# Patient Record
Sex: FEMALE | Race: WHITE | Employment: OTHER | URBAN - METROPOLITAN AREA
[De-identification: names, ages, dates, MRNs, and addresses within clinical notes are randomized per-mention and may not be internally consistent; named-entity substitution may affect disease eponyms.]

---

## 2018-08-22 ENCOUNTER — NURSE ONLY (OUTPATIENT)
Dept: FAMILY MEDICINE CLINIC | Age: 78
End: 2018-08-22
Payer: MEDICARE

## 2018-08-22 ENCOUNTER — HOSPITAL ENCOUNTER (OUTPATIENT)
Age: 78
Discharge: HOME | End: 2018-08-24
Payer: MEDICARE

## 2018-08-22 DIAGNOSIS — E78.5 HYPERLIPIDEMIA, UNSPECIFIED HYPERLIPIDEMIA TYPE: ICD-10-CM

## 2018-08-22 DIAGNOSIS — E55.9 VITAMIN D DEFICIENCY: Primary | ICD-10-CM

## 2018-08-22 DIAGNOSIS — E55.9 VITAMIN D DEFICIENCY: ICD-10-CM

## 2018-08-22 LAB
ALBUMIN SERPL-MCNC: 4 G/DL (ref 3.5–5.2)
ALP BLD-CCNC: 97 U/L (ref 35–104)
ALT SERPL-CCNC: 8 U/L (ref 0–32)
ANION GAP SERPL CALCULATED.3IONS-SCNC: 18 MMOL/L (ref 7–16)
AST SERPL-CCNC: 18 U/L (ref 0–31)
BASOPHILS ABSOLUTE: 0.02 E9/L (ref 0–0.2)
BASOPHILS RELATIVE PERCENT: 0.4 % (ref 0–2)
BILIRUB SERPL-MCNC: 0.5 MG/DL (ref 0–1.2)
BUN BLDV-MCNC: 13 MG/DL (ref 8–23)
CALCIUM SERPL-MCNC: 9.2 MG/DL (ref 8.6–10.2)
CHLORIDE BLD-SCNC: 100 MMOL/L (ref 98–107)
CHOLESTEROL, TOTAL: 269 MG/DL (ref 0–199)
CO2: 22 MMOL/L (ref 22–29)
CREAT SERPL-MCNC: 1 MG/DL (ref 0.5–1)
EOSINOPHILS ABSOLUTE: 0.04 E9/L (ref 0.05–0.5)
EOSINOPHILS RELATIVE PERCENT: 0.7 % (ref 0–6)
GFR AFRICAN AMERICAN: >60
GFR NON-AFRICAN AMERICAN: 54 ML/MIN/1.73
GLUCOSE BLD-MCNC: 100 MG/DL (ref 74–109)
HCT VFR BLD CALC: 43.3 % (ref 34–48)
HDLC SERPL-MCNC: 70 MG/DL
HEMOGLOBIN: 13.8 G/DL (ref 11.5–15.5)
IMMATURE GRANULOCYTES #: 0.01 E9/L
IMMATURE GRANULOCYTES %: 0.2 % (ref 0–5)
LDL CHOLESTEROL CALCULATED: 178 MG/DL (ref 0–99)
LYMPHOCYTES ABSOLUTE: 1.51 E9/L (ref 1.5–4)
LYMPHOCYTES RELATIVE PERCENT: 27.7 % (ref 20–42)
MCH RBC QN AUTO: 29.9 PG (ref 26–35)
MCHC RBC AUTO-ENTMCNC: 31.9 % (ref 32–34.5)
MCV RBC AUTO: 93.7 FL (ref 80–99.9)
MONOCYTES ABSOLUTE: 0.39 E9/L (ref 0.1–0.95)
MONOCYTES RELATIVE PERCENT: 7.1 % (ref 2–12)
NEUTROPHILS ABSOLUTE: 3.49 E9/L (ref 1.8–7.3)
NEUTROPHILS RELATIVE PERCENT: 63.9 % (ref 43–80)
PDW BLD-RTO: 13.9 FL (ref 11.5–15)
PLATELET # BLD: 145 E9/L (ref 130–450)
PMV BLD AUTO: 11.8 FL (ref 7–12)
POTASSIUM SERPL-SCNC: 4.6 MMOL/L (ref 3.5–5)
RBC # BLD: 4.62 E12/L (ref 3.5–5.5)
SODIUM BLD-SCNC: 140 MMOL/L (ref 132–146)
TOTAL PROTEIN: 7.7 G/DL (ref 6.4–8.3)
TRIGL SERPL-MCNC: 107 MG/DL (ref 0–149)
TSH SERPL DL<=0.05 MIU/L-ACNC: 1.95 UIU/ML (ref 0.27–4.2)
VITAMIN D 25-HYDROXY: 43 NG/ML (ref 30–100)
VLDLC SERPL CALC-MCNC: 21 MG/DL
WBC # BLD: 5.5 E9/L (ref 4.5–11.5)

## 2018-08-22 PROCEDURE — 80053 COMPREHEN METABOLIC PANEL: CPT

## 2018-08-22 PROCEDURE — 36415 COLL VENOUS BLD VENIPUNCTURE: CPT | Performed by: FAMILY MEDICINE

## 2018-08-22 PROCEDURE — 80061 LIPID PANEL: CPT

## 2018-08-22 PROCEDURE — 84443 ASSAY THYROID STIM HORMONE: CPT

## 2018-08-22 PROCEDURE — 85025 COMPLETE CBC W/AUTO DIFF WBC: CPT

## 2018-08-22 PROCEDURE — 82306 VITAMIN D 25 HYDROXY: CPT

## 2018-08-28 ENCOUNTER — OFFICE VISIT (OUTPATIENT)
Dept: FAMILY MEDICINE CLINIC | Age: 78
End: 2018-08-28
Payer: MEDICARE

## 2018-08-28 VITALS
HEIGHT: 65 IN | SYSTOLIC BLOOD PRESSURE: 180 MMHG | OXYGEN SATURATION: 98 % | TEMPERATURE: 98.2 F | HEART RATE: 64 BPM | RESPIRATION RATE: 18 BRPM | DIASTOLIC BLOOD PRESSURE: 72 MMHG | BODY MASS INDEX: 23.03 KG/M2 | WEIGHT: 138.2 LBS

## 2018-08-28 DIAGNOSIS — Z00.00 ROUTINE GENERAL MEDICAL EXAMINATION AT A HEALTH CARE FACILITY: Primary | ICD-10-CM

## 2018-08-28 PROCEDURE — G0439 PPPS, SUBSEQ VISIT: HCPCS | Performed by: FAMILY MEDICINE

## 2018-08-28 PROCEDURE — 4040F PNEUMOC VAC/ADMIN/RCVD: CPT | Performed by: FAMILY MEDICINE

## 2018-08-28 ASSESSMENT — LIFESTYLE VARIABLES
HAVE YOU OR SOMEONE ELSE BEEN INJURED AS A RESULT OF YOUR DRINKING: 0
HOW OFTEN DURING THE LAST YEAR HAVE YOU HAD A FEELING OF GUILT OR REMORSE AFTER DRINKING: 0
HOW OFTEN DURING THE LAST YEAR HAVE YOU NEEDED AN ALCOHOLIC DRINK FIRST THING IN THE MORNING TO GET YOURSELF GOING AFTER A NIGHT OF HEAVY DRINKING: 0
HOW MANY STANDARD DRINKS CONTAINING ALCOHOL DO YOU HAVE ON A TYPICAL DAY: 0
HAS A RELATIVE, FRIEND, DOCTOR, OR ANOTHER HEALTH PROFESSIONAL EXPRESSED CONCERN ABOUT YOUR DRINKING OR SUGGESTED YOU CUT DOWN: 0
HOW OFTEN DURING THE LAST YEAR HAVE YOU BEEN UNABLE TO REMEMBER WHAT HAPPENED THE NIGHT BEFORE BECAUSE YOU HAD BEEN DRINKING: 0
HOW OFTEN DO YOU HAVE A DRINK CONTAINING ALCOHOL: 1
AUDIT-C TOTAL SCORE: 1
AUDIT TOTAL SCORE: 1
HOW OFTEN DO YOU HAVE SIX OR MORE DRINKS ON ONE OCCASION: 0
HOW OFTEN DURING THE LAST YEAR HAVE YOU FOUND THAT YOU WERE NOT ABLE TO STOP DRINKING ONCE YOU HAD STARTED: 0
HOW OFTEN DURING THE LAST YEAR HAVE YOU FAILED TO DO WHAT WAS NORMALLY EXPECTED FROM YOU BECAUSE OF DRINKING: 0

## 2018-08-28 ASSESSMENT — PATIENT HEALTH QUESTIONNAIRE - PHQ9
SUM OF ALL RESPONSES TO PHQ QUESTIONS 1-9: 0
SUM OF ALL RESPONSES TO PHQ QUESTIONS 1-9: 0

## 2018-08-28 ASSESSMENT — ANXIETY QUESTIONNAIRES: GAD7 TOTAL SCORE: 0

## 2019-03-06 ENCOUNTER — OFFICE VISIT (OUTPATIENT)
Dept: ORTHOPEDIC SURGERY | Age: 79
End: 2019-03-06
Payer: MEDICARE

## 2019-03-06 VITALS
WEIGHT: 138 LBS | HEIGHT: 65 IN | BODY MASS INDEX: 22.99 KG/M2 | HEART RATE: 74 BPM | SYSTOLIC BLOOD PRESSURE: 139 MMHG | TEMPERATURE: 97.3 F | DIASTOLIC BLOOD PRESSURE: 83 MMHG

## 2019-03-06 DIAGNOSIS — M79.675 PAIN OF TOE OF LEFT FOOT: Primary | ICD-10-CM

## 2019-03-06 PROCEDURE — 1123F ACP DISCUSS/DSCN MKR DOCD: CPT | Performed by: ORTHOPAEDIC SURGERY

## 2019-03-06 PROCEDURE — 1101F PT FALLS ASSESS-DOCD LE1/YR: CPT | Performed by: ORTHOPAEDIC SURGERY

## 2019-03-06 PROCEDURE — G8420 CALC BMI NORM PARAMETERS: HCPCS | Performed by: ORTHOPAEDIC SURGERY

## 2019-03-06 PROCEDURE — 1090F PRES/ABSN URINE INCON ASSESS: CPT | Performed by: ORTHOPAEDIC SURGERY

## 2019-03-06 PROCEDURE — 1036F TOBACCO NON-USER: CPT | Performed by: ORTHOPAEDIC SURGERY

## 2019-03-06 PROCEDURE — G8427 DOCREV CUR MEDS BY ELIG CLIN: HCPCS | Performed by: ORTHOPAEDIC SURGERY

## 2019-03-06 PROCEDURE — 99202 OFFICE O/P NEW SF 15 MIN: CPT | Performed by: ORTHOPAEDIC SURGERY

## 2019-03-06 PROCEDURE — G8484 FLU IMMUNIZE NO ADMIN: HCPCS | Performed by: ORTHOPAEDIC SURGERY

## 2019-03-06 PROCEDURE — 4040F PNEUMOC VAC/ADMIN/RCVD: CPT | Performed by: ORTHOPAEDIC SURGERY

## 2019-03-06 PROCEDURE — G8399 PT W/DXA RESULTS DOCUMENT: HCPCS | Performed by: ORTHOPAEDIC SURGERY

## 2019-04-23 ENCOUNTER — OFFICE VISIT (OUTPATIENT)
Dept: FAMILY MEDICINE CLINIC | Age: 79
End: 2019-04-23
Payer: MEDICARE

## 2019-04-23 VITALS
SYSTOLIC BLOOD PRESSURE: 137 MMHG | HEIGHT: 65 IN | BODY MASS INDEX: 24.62 KG/M2 | TEMPERATURE: 98.6 F | HEART RATE: 64 BPM | WEIGHT: 147.8 LBS | RESPIRATION RATE: 16 BRPM | DIASTOLIC BLOOD PRESSURE: 67 MMHG | OXYGEN SATURATION: 95 %

## 2019-04-23 DIAGNOSIS — L23.9 ALLERGIC CONTACT DERMATITIS, UNSPECIFIED TRIGGER: Primary | ICD-10-CM

## 2019-04-23 PROCEDURE — G8427 DOCREV CUR MEDS BY ELIG CLIN: HCPCS | Performed by: FAMILY MEDICINE

## 2019-04-23 PROCEDURE — G8399 PT W/DXA RESULTS DOCUMENT: HCPCS | Performed by: FAMILY MEDICINE

## 2019-04-23 PROCEDURE — 1123F ACP DISCUSS/DSCN MKR DOCD: CPT | Performed by: FAMILY MEDICINE

## 2019-04-23 PROCEDURE — 4040F PNEUMOC VAC/ADMIN/RCVD: CPT | Performed by: FAMILY MEDICINE

## 2019-04-23 PROCEDURE — 1036F TOBACCO NON-USER: CPT | Performed by: FAMILY MEDICINE

## 2019-04-23 PROCEDURE — G8420 CALC BMI NORM PARAMETERS: HCPCS | Performed by: FAMILY MEDICINE

## 2019-04-23 PROCEDURE — 1090F PRES/ABSN URINE INCON ASSESS: CPT | Performed by: FAMILY MEDICINE

## 2019-04-23 PROCEDURE — 99213 OFFICE O/P EST LOW 20 MIN: CPT | Performed by: FAMILY MEDICINE

## 2019-04-23 RX ORDER — TRIAMCINOLONE ACETONIDE 1 MG/G
CREAM TOPICAL
Qty: 60 G | Refills: 0 | Status: SHIPPED
Start: 2019-04-23 | End: 2022-09-23

## 2019-04-23 ASSESSMENT — PATIENT HEALTH QUESTIONNAIRE - PHQ9
SUM OF ALL RESPONSES TO PHQ QUESTIONS 1-9: 0
SUM OF ALL RESPONSES TO PHQ9 QUESTIONS 1 & 2: 0
SUM OF ALL RESPONSES TO PHQ QUESTIONS 1-9: 0
1. LITTLE INTEREST OR PLEASURE IN DOING THINGS: 0
2. FEELING DOWN, DEPRESSED OR HOPELESS: 0

## 2019-04-23 NOTE — PROGRESS NOTES
Rash:  Patient is here today with complaints of a rash. This has been going on for 1 week(s). Rash is located on right wrist.  It is not spreading. Exacerbating factors include none. Alleviating factors include hydrocortisone cream and Essential oils. Associated signs and symptoms include redness and raised. Patient has not changed hygiene products. Patient does not have pets. This has not happened to patient in the past.  Patient has not had recent travel. Patient's past medical, surgical, social and/or family history reviewed, updated in chart, and are non-contributory (unless otherwise stated). Medications and allergies also reviewed and updated in chart.       Review of Systems:  Constitutional:  No fever, no fatigue, no chills, no headaches, no weight change  Dermatology:  No rash, no mole, no dry or sensitive skin  ENT:  No cough, no sore throat, no sinus pain, no runny nose, no ear pain  Cardiology:  No chest pain, no palpitations, no leg edema, no shortness of breath, no PND  Gastroenterology:  No dysphagia, no abdominal pain, no nausea, no vomiting, no constipation, no diarrhea, no heartburn  Musculoskeletal:  No joint pain, no leg cramps, no back pain, no muscle aches  Respiratory:  No shortness of breath, no orthopnea, no wheezing, no HAYWARD, no hemoptysis  Urology:  No blood in the urine, no urinary frequency, no urinary incontinence, no urinary urgency, no nocturia, no dysuria    Vitals:    04/23/19 1459 04/23/19 1504   BP: (!) 152/78 137/67   Pulse: 64    Resp: 16    Temp: 98.6 °F (37 °C)    TempSrc: Oral    SpO2: 95%    Weight: 147 lb 12.8 oz (67 kg)    Height: 5' 5\" (1.651 m)        General:  Patient alert and oriented x 3, NAD, pleasant  HEENT:  Atraumatic, normocephalic, PERRLA, EOMI, clear conjunctiva, TMs clear, nose-clear, throat - no erythema  Neck:  Supple, no goiter, no carotid bruits, no LAD  Lungs:  CTA B  Heart:  RRR, no murmurs, gallops or rubs  Abdomen:  Soft/nt/nd, + bowel sounds  Extremities:  No clubbing, cyanosis or edema  Skin: R wrist with erythematous papules    Assessment/Plan:      Sushant Leyva was seen today for rash. Diagnoses and all orders for this visit:    Allergic contact dermatitis, unspecified trigger    Other orders  -     triamcinolone (KENALOG) 0.1 % cream; Apply to affected area twice a day      As above. Call or go to ED immediately if symptoms worsen or persist.  No follow-ups on file. , or sooner if necessary. Educational materials and/or home exercises printed for patient's review and were included in patient instructions on his/her After Visit Summary and given to patient at the end of visit. Counseled regarding above diagnosis, including possible risks and complications,  especially if left uncontrolled. Counseled regarding the possible side effects, risks, benefits and alternatives to treatment; patient and/or guardian verbalizes understanding, agrees, feels comfortable with and wishes to proceed with above treatment plan. Advised patient to call with any new medication issues, and read all Rx info from pharmacy to assure aware of all possible risks and side effects of medication before taking. Reviewed age and gender appropriate health screening exams and vaccinations. Advised patient regarding importance of keeping up with recommended health maintenance and to schedule as soon as possible if overdue, as this is important in assessing for undiagnosed pathology, especially cancer, as well as protecting against potentially harmful/life threatening disease. Patient and/or guardian verbalizes understanding and agrees with above counseling, assessment and plan. All questions answered.

## 2019-07-18 DIAGNOSIS — E55.9 VITAMIN D DEFICIENCY: ICD-10-CM

## 2019-07-18 DIAGNOSIS — E78.5 HYPERLIPIDEMIA, UNSPECIFIED HYPERLIPIDEMIA TYPE: Primary | ICD-10-CM

## 2019-09-10 ENCOUNTER — HOSPITAL ENCOUNTER (OUTPATIENT)
Age: 79
Discharge: HOME | End: 2019-09-12
Payer: MEDICARE

## 2019-09-10 ENCOUNTER — NURSE ONLY (OUTPATIENT)
Dept: FAMILY MEDICINE CLINIC | Age: 79
End: 2019-09-10
Payer: MEDICARE

## 2019-09-10 DIAGNOSIS — E78.5 HYPERLIPIDEMIA, UNSPECIFIED HYPERLIPIDEMIA TYPE: ICD-10-CM

## 2019-09-10 DIAGNOSIS — E55.9 VITAMIN D DEFICIENCY: ICD-10-CM

## 2019-09-10 LAB
ALBUMIN SERPL-MCNC: 4.2 G/DL (ref 3.5–5.2)
ALP BLD-CCNC: 113 U/L (ref 35–104)
ALT SERPL-CCNC: 7 U/L (ref 0–32)
ANION GAP SERPL CALCULATED.3IONS-SCNC: 13 MMOL/L (ref 7–16)
AST SERPL-CCNC: 16 U/L (ref 0–31)
BASOPHILS ABSOLUTE: 0.02 E9/L (ref 0–0.2)
BASOPHILS RELATIVE PERCENT: 0.5 % (ref 0–2)
BILIRUB SERPL-MCNC: 0.4 MG/DL (ref 0–1.2)
BUN BLDV-MCNC: 13 MG/DL (ref 8–23)
CALCIUM SERPL-MCNC: 9.2 MG/DL (ref 8.6–10.2)
CHLORIDE BLD-SCNC: 102 MMOL/L (ref 98–107)
CHOLESTEROL, TOTAL: 270 MG/DL (ref 0–199)
CO2: 24 MMOL/L (ref 22–29)
CREAT SERPL-MCNC: 1.1 MG/DL (ref 0.5–1)
EOSINOPHILS ABSOLUTE: 0.08 E9/L (ref 0.05–0.5)
EOSINOPHILS RELATIVE PERCENT: 1.9 % (ref 0–6)
GFR AFRICAN AMERICAN: 58
GFR NON-AFRICAN AMERICAN: 48 ML/MIN/1.73
GLUCOSE BLD-MCNC: 101 MG/DL (ref 74–99)
HCT VFR BLD CALC: 44.2 % (ref 34–48)
HDLC SERPL-MCNC: 81 MG/DL
HEMOGLOBIN: 13.5 G/DL (ref 11.5–15.5)
IMMATURE GRANULOCYTES #: 0.01 E9/L
IMMATURE GRANULOCYTES %: 0.2 % (ref 0–5)
LDL CHOLESTEROL CALCULATED: 170 MG/DL (ref 0–99)
LYMPHOCYTES ABSOLUTE: 1.25 E9/L (ref 1.5–4)
LYMPHOCYTES RELATIVE PERCENT: 29.3 % (ref 20–42)
MCH RBC QN AUTO: 29.2 PG (ref 26–35)
MCHC RBC AUTO-ENTMCNC: 30.5 % (ref 32–34.5)
MCV RBC AUTO: 95.5 FL (ref 80–99.9)
MONOCYTES ABSOLUTE: 0.4 E9/L (ref 0.1–0.95)
MONOCYTES RELATIVE PERCENT: 9.4 % (ref 2–12)
NEUTROPHILS ABSOLUTE: 2.51 E9/L (ref 1.8–7.3)
NEUTROPHILS RELATIVE PERCENT: 58.7 % (ref 43–80)
PDW BLD-RTO: 13.6 FL (ref 11.5–15)
PLATELET # BLD: 307 E9/L (ref 130–450)
PMV BLD AUTO: 9.9 FL (ref 7–12)
POTASSIUM SERPL-SCNC: 4.5 MMOL/L (ref 3.5–5)
RBC # BLD: 4.63 E12/L (ref 3.5–5.5)
SODIUM BLD-SCNC: 139 MMOL/L (ref 132–146)
TOTAL PROTEIN: 7.3 G/DL (ref 6.4–8.3)
TRIGL SERPL-MCNC: 94 MG/DL (ref 0–149)
TSH SERPL DL<=0.05 MIU/L-ACNC: 1.67 UIU/ML (ref 0.27–4.2)
VITAMIN D 25-HYDROXY: 43 NG/ML (ref 30–100)
VLDLC SERPL CALC-MCNC: 19 MG/DL
WBC # BLD: 4.3 E9/L (ref 4.5–11.5)

## 2019-09-10 PROCEDURE — 84443 ASSAY THYROID STIM HORMONE: CPT

## 2019-09-10 PROCEDURE — 80053 COMPREHEN METABOLIC PANEL: CPT

## 2019-09-10 PROCEDURE — 85025 COMPLETE CBC W/AUTO DIFF WBC: CPT

## 2019-09-10 PROCEDURE — 80061 LIPID PANEL: CPT

## 2019-09-10 PROCEDURE — 82306 VITAMIN D 25 HYDROXY: CPT

## 2019-09-10 PROCEDURE — 36415 COLL VENOUS BLD VENIPUNCTURE: CPT | Performed by: FAMILY MEDICINE

## 2019-09-12 ENCOUNTER — OFFICE VISIT (OUTPATIENT)
Dept: FAMILY MEDICINE CLINIC | Age: 79
End: 2019-09-12
Payer: MEDICARE

## 2019-09-12 VITALS
BODY MASS INDEX: 24.16 KG/M2 | HEIGHT: 65 IN | HEART RATE: 63 BPM | OXYGEN SATURATION: 96 % | SYSTOLIC BLOOD PRESSURE: 167 MMHG | RESPIRATION RATE: 16 BRPM | TEMPERATURE: 98.3 F | WEIGHT: 145 LBS | DIASTOLIC BLOOD PRESSURE: 78 MMHG

## 2019-09-12 DIAGNOSIS — Z23 NEED FOR PNEUMOCOCCAL VACCINATION: Primary | ICD-10-CM

## 2019-09-12 DIAGNOSIS — Z12.31 ENCOUNTER FOR SCREENING MAMMOGRAM FOR MALIGNANT NEOPLASM OF BREAST: ICD-10-CM

## 2019-09-12 DIAGNOSIS — Z00.00 ROUTINE GENERAL MEDICAL EXAMINATION AT A HEALTH CARE FACILITY: ICD-10-CM

## 2019-09-12 PROCEDURE — 1123F ACP DISCUSS/DSCN MKR DOCD: CPT | Performed by: FAMILY MEDICINE

## 2019-09-12 PROCEDURE — G0439 PPPS, SUBSEQ VISIT: HCPCS | Performed by: FAMILY MEDICINE

## 2019-09-12 PROCEDURE — 4040F PNEUMOC VAC/ADMIN/RCVD: CPT | Performed by: FAMILY MEDICINE

## 2019-09-12 ASSESSMENT — LIFESTYLE VARIABLES
HOW MANY STANDARD DRINKS CONTAINING ALCOHOL DO YOU HAVE ON A TYPICAL DAY: 0
AUDIT TOTAL SCORE: 1
HOW OFTEN DO YOU HAVE SIX OR MORE DRINKS ON ONE OCCASION: 0
HAVE YOU OR SOMEONE ELSE BEEN INJURED AS A RESULT OF YOUR DRINKING: 0
HOW OFTEN DO YOU HAVE A DRINK CONTAINING ALCOHOL: 1
AUDIT-C TOTAL SCORE: 1
HOW OFTEN DURING THE LAST YEAR HAVE YOU BEEN UNABLE TO REMEMBER WHAT HAPPENED THE NIGHT BEFORE BECAUSE YOU HAD BEEN DRINKING: 0
HOW OFTEN DURING THE LAST YEAR HAVE YOU NEEDED AN ALCOHOLIC DRINK FIRST THING IN THE MORNING TO GET YOURSELF GOING AFTER A NIGHT OF HEAVY DRINKING: 0
HAS A RELATIVE, FRIEND, DOCTOR, OR ANOTHER HEALTH PROFESSIONAL EXPRESSED CONCERN ABOUT YOUR DRINKING OR SUGGESTED YOU CUT DOWN: 0
HOW OFTEN DURING THE LAST YEAR HAVE YOU FAILED TO DO WHAT WAS NORMALLY EXPECTED FROM YOU BECAUSE OF DRINKING: 0
HOW OFTEN DURING THE LAST YEAR HAVE YOU FOUND THAT YOU WERE NOT ABLE TO STOP DRINKING ONCE YOU HAD STARTED: 0
HOW OFTEN DURING THE LAST YEAR HAVE YOU HAD A FEELING OF GUILT OR REMORSE AFTER DRINKING: 0

## 2019-09-12 ASSESSMENT — PATIENT HEALTH QUESTIONNAIRE - PHQ9
SUM OF ALL RESPONSES TO PHQ QUESTIONS 1-9: 0
SUM OF ALL RESPONSES TO PHQ QUESTIONS 1-9: 0

## 2019-09-12 NOTE — PROGRESS NOTES
Provider    Wt Readings from Last 3 Encounters:   09/12/19 145 lb (65.8 kg)   04/23/19 147 lb 12.8 oz (67 kg)   03/06/19 138 lb (62.6 kg)     Vitals:    09/12/19 1103 09/12/19 1106   BP: (!) 168/83 (!) 167/78   Pulse: 63    Resp: 16    Temp: 98.3 °F (36.8 °C)    TempSrc: Oral    SpO2: 96%    Weight: 145 lb (65.8 kg)    Height: 5' 5\" (1.651 m)      Body mass index is 24.13 kg/m². Based upon direct observation of the patient, evaluation of cognition reveals recent and remote memory intact. General Appearance: alert and oriented to person, place and time, well developed and well- nourished, in no acute distress  Skin: warm and dry, no rash or erythema  Head: normocephalic and atraumatic  Eyes: pupils equal, round, and reactive to light, extraocular eye movements intact, conjunctivae normal  ENT: tympanic membrane, external ear and ear canal normal bilaterally, nose without deformity, nasal mucosa and turbinates normal without polyps  Neck: supple and non-tender without mass, no thyromegaly or thyroid nodules, no cervical lymphadenopathy  Pulmonary/Chest: clear to auscultation bilaterally- no wheezes, rales or rhonchi, normal air movement, no respiratory distress  Cardiovascular: normal rate, regular rhythm, normal S1 and S2, no murmurs, rubs, clicks, or gallops, distal pulses intact, no carotid bruits  Abdomen: soft, non-tender, non-distended, normal bowel sounds, no masses or organomegaly  Extremities: no cyanosis, clubbing or edema  Musculoskeletal: normal range of motion, no joint swelling, deformity or tenderness  Neurologic: reflexes normal and symmetric, no cranial nerve deficit, gait, coordination and speech normal    Patient's complete Health Risk Assessment and screening values have been reviewed and are found in Flowsheets. The following problems were reviewed today and where indicated follow up appointments were made and/or referrals ordered.     Positive Risk Factor Screenings with Interventions: Hearing/Vision:  No exam data present  Hearing/Vision  Do you or your family notice any trouble with your hearing?: No  Do you have difficulty driving, watching TV, or doing any of your daily activities because of your eyesight?: No  Have you had an eye exam within the past year?: (!) No  Hearing/Vision Interventions:  · Vision concerns:  patient encouraged to make appointment with his/her eye specialist    Personalized Preventive Plan   Current Health Maintenance Status    There is no immunization history on file for this patient. Health Maintenance   Topic Date Due    DTaP/Tdap/Td vaccine (1 - Tdap) 04/18/1961    Shingles Vaccine (1 of 2) 04/18/1992    Pneumococcal 65+ years Vaccine (1 of 2 - PCV13) 04/18/2007    Annual Wellness Visit (AWV)  05/29/2019    Flu vaccine (1) 09/01/2019    DEXA (modify frequency per FRAX score)  Completed     Recommendations for Preventive Services Due: see orders and patient instructions/AVS.  . Recommended screening schedule for the next 5-10 years is provided to the patient in written form: see Patient Instructions/AVS.    Germania Mata was seen today for medicare awv.     Diagnoses and all orders for this visit:    Need for pneumococcal vaccination

## 2019-10-09 ENCOUNTER — HOSPITAL ENCOUNTER (OUTPATIENT)
Dept: GENERAL RADIOLOGY | Age: 79
Discharge: HOME | End: 2019-10-11
Payer: MEDICARE

## 2019-10-09 DIAGNOSIS — Z12.31 ENCOUNTER FOR SCREENING MAMMOGRAM FOR MALIGNANT NEOPLASM OF BREAST: ICD-10-CM

## 2019-10-09 PROCEDURE — 77063 BREAST TOMOSYNTHESIS BI: CPT

## 2020-02-27 ENCOUNTER — TELEPHONE (OUTPATIENT)
Dept: FAMILY MEDICINE CLINIC | Age: 80
End: 2020-02-27

## 2020-02-27 RX ORDER — AMOXICILLIN 500 MG/1
500 CAPSULE ORAL 3 TIMES DAILY
Qty: 42 CAPSULE | Refills: 0 | Status: SHIPPED
Start: 2020-02-27 | End: 2020-03-19 | Stop reason: SDUPTHER

## 2020-02-27 NOTE — TELEPHONE ENCOUNTER
She asked for a refill on her amoxicillin 500 mg. She said she thinks she has bronchitis again which she gets due to the lyme's disease. She has a cough, clear mucous,no temp.  Please send to [facility]

## 2020-03-13 ENCOUNTER — TELEPHONE (OUTPATIENT)
Dept: FAMILY MEDICINE CLINIC | Age: 80
End: 2020-03-13

## 2020-03-13 RX ORDER — CIPROFLOXACIN HYDROCHLORIDE 3.5 MG/ML
1 SOLUTION/ DROPS TOPICAL 3 TIMES DAILY
Qty: 1 BOTTLE | Refills: 0 | Status: SHIPPED | OUTPATIENT
Start: 2020-03-13 | End: 2020-03-20

## 2020-03-13 NOTE — TELEPHONE ENCOUNTER
She has itchy and red eyes some. She said its not pink eye but they are a little watery.  She said her appt yesterday was cancelled and she asked if you can send in a script to [facility] [place]

## 2020-03-19 ENCOUNTER — OFFICE VISIT (OUTPATIENT)
Dept: FAMILY MEDICINE CLINIC | Age: 80
End: 2020-03-19
Payer: MEDICARE

## 2020-03-19 VITALS
RESPIRATION RATE: 16 BRPM | HEART RATE: 68 BPM | HEIGHT: 66 IN | WEIGHT: 150 LBS | SYSTOLIC BLOOD PRESSURE: 171 MMHG | OXYGEN SATURATION: 98 % | BODY MASS INDEX: 24.11 KG/M2 | TEMPERATURE: 98.7 F | DIASTOLIC BLOOD PRESSURE: 97 MMHG

## 2020-03-19 PROCEDURE — 1090F PRES/ABSN URINE INCON ASSESS: CPT | Performed by: FAMILY MEDICINE

## 2020-03-19 PROCEDURE — G8420 CALC BMI NORM PARAMETERS: HCPCS | Performed by: FAMILY MEDICINE

## 2020-03-19 PROCEDURE — 99213 OFFICE O/P EST LOW 20 MIN: CPT | Performed by: FAMILY MEDICINE

## 2020-03-19 PROCEDURE — G8399 PT W/DXA RESULTS DOCUMENT: HCPCS | Performed by: FAMILY MEDICINE

## 2020-03-19 PROCEDURE — 1036F TOBACCO NON-USER: CPT | Performed by: FAMILY MEDICINE

## 2020-03-19 PROCEDURE — G8427 DOCREV CUR MEDS BY ELIG CLIN: HCPCS | Performed by: FAMILY MEDICINE

## 2020-03-19 PROCEDURE — 1123F ACP DISCUSS/DSCN MKR DOCD: CPT | Performed by: FAMILY MEDICINE

## 2020-03-19 PROCEDURE — G8484 FLU IMMUNIZE NO ADMIN: HCPCS | Performed by: FAMILY MEDICINE

## 2020-03-19 PROCEDURE — 4040F PNEUMOC VAC/ADMIN/RCVD: CPT | Performed by: FAMILY MEDICINE

## 2020-03-19 RX ORDER — AMOXICILLIN 500 MG/1
500 CAPSULE ORAL 3 TIMES DAILY
Qty: 42 CAPSULE | Refills: 0 | Status: SHIPPED | OUTPATIENT
Start: 2020-03-19 | End: 2020-04-02

## 2020-03-19 NOTE — PROGRESS NOTES
Chief Complaint   Patient presents with    Other    Cough       HPI:  Patient is here for follow-up of bronchitis. Patient complains of feeling better, no fevers, less coughing so she thinks it almost done. She finished Amoxil a few days ago. She feels well. Patient's past medical, surgical, social and/or family history reviewed, updated in chart, and are non-contributory (unless otherwise stated). Medications and allergies also reviewed and updated in chart. Review of Systems:  Constitutional:  No fever, no fatigue, no chills, no headaches, no weight change  Dermatology:  No rash, no mole, no dry or sensitive skin  ENT:  No cough, no sore throat, no sinus pain, no runny nose, no ear pain  Cardiology:  No chest pain, no palpitations, no leg edema, no shortness of breath, no PND  Gastroenterology:  No dysphagia, no abdominal pain, no nausea, no vomiting, no constipation, no diarrhea, no heartburn  Musculoskeletal:  No joint pain, no leg cramps, no back pain, no muscle aches  Respiratory:  No shortness of breath, no orthopnea, no wheezing, no HAYWARD, no hemoptysis  Urology:  No blood in the urine, no urinary frequency, no urinary incontinence, no urinary urgency, no nocturia, no dysuria  Vitals:    03/19/20 0942   BP: (!) 171/97   Pulse: 68   Resp: 16   Temp: 98.7 °F (37.1 °C)   TempSrc: Temporal   SpO2: 98%   Weight: 150 lb (68 kg)   Height: 5' 5.5\" (1.664 m)       General:  Patient alert and oriented x 3, NAD, pleasant  HEENT:  Atraumatic, normocephalic, PERRLA, EOMI, clear conjunctiva, TMs clear, nose-clear, throat - no erythema  Neck:  Supple, no goiter, no carotid bruits, no lymphadenopathy  Lungs:  CTA B  Heart:  RRR, no murmurs, gallops or rubs  Abdomen:  Soft/nt/nd, + bowel sounds  Extremities:  No clubbing, cyanosis or edema  Skin: unremarkable    Assessment/Plan:      Jeanette Crowder was seen today for other and cough.     Diagnoses and all orders for this visit:    Acute bronchitis, unspecified

## 2020-03-24 ENCOUNTER — TELEPHONE (OUTPATIENT)
Dept: FAMILY MEDICINE CLINIC | Age: 80
End: 2020-03-24

## 2020-03-24 NOTE — TELEPHONE ENCOUNTER
[employer] enrollment letter, and Evisit flyer sent to patient.  Mailed to address listed in patients chart

## 2020-09-08 ENCOUNTER — NURSE ONLY (OUTPATIENT)
Dept: FAMILY MEDICINE CLINIC | Age: 80
End: 2020-09-08
Payer: MEDICARE

## 2020-09-08 ENCOUNTER — HOSPITAL ENCOUNTER (OUTPATIENT)
Age: 80
Discharge: HOME | End: 2020-09-10
Payer: MEDICARE

## 2020-09-08 PROCEDURE — 84443 ASSAY THYROID STIM HORMONE: CPT

## 2020-09-08 PROCEDURE — 36415 COLL VENOUS BLD VENIPUNCTURE: CPT | Performed by: FAMILY MEDICINE

## 2020-09-08 PROCEDURE — 80053 COMPREHEN METABOLIC PANEL: CPT

## 2020-09-08 PROCEDURE — 85025 COMPLETE CBC W/AUTO DIFF WBC: CPT

## 2020-09-08 PROCEDURE — 80061 LIPID PANEL: CPT

## 2020-09-09 LAB
ALBUMIN SERPL-MCNC: 3.9 G/DL (ref 3.5–5.2)
ALP BLD-CCNC: 88 U/L (ref 35–104)
ALT SERPL-CCNC: 8 U/L (ref 0–32)
ANION GAP SERPL CALCULATED.3IONS-SCNC: 16 MMOL/L (ref 7–16)
AST SERPL-CCNC: 21 U/L (ref 0–31)
BASOPHILS ABSOLUTE: 0.01 E9/L (ref 0–0.2)
BASOPHILS RELATIVE PERCENT: 0.2 % (ref 0–2)
BILIRUB SERPL-MCNC: 0.4 MG/DL (ref 0–1.2)
BUN BLDV-MCNC: 13 MG/DL (ref 8–23)
CALCIUM SERPL-MCNC: 9.5 MG/DL (ref 8.6–10.2)
CHLORIDE BLD-SCNC: 101 MMOL/L (ref 98–107)
CHOLESTEROL, TOTAL: 230 MG/DL (ref 0–199)
CO2: 20 MMOL/L (ref 22–29)
CREAT SERPL-MCNC: 0.9 MG/DL (ref 0.5–1)
EOSINOPHILS ABSOLUTE: 0.03 E9/L (ref 0.05–0.5)
EOSINOPHILS RELATIVE PERCENT: 0.6 % (ref 0–6)
GFR AFRICAN AMERICAN: >60
GFR NON-AFRICAN AMERICAN: >60 ML/MIN/1.73
GLUCOSE BLD-MCNC: 82 MG/DL (ref 74–99)
HCT VFR BLD CALC: 43.7 % (ref 34–48)
HDLC SERPL-MCNC: 78 MG/DL
HEMOGLOBIN: 13.4 G/DL (ref 11.5–15.5)
IMMATURE GRANULOCYTES #: 0.03 E9/L
IMMATURE GRANULOCYTES %: 0.6 % (ref 0–5)
LDL CHOLESTEROL CALCULATED: 138 MG/DL (ref 0–99)
LYMPHOCYTES ABSOLUTE: 1.29 E9/L (ref 1.5–4)
LYMPHOCYTES RELATIVE PERCENT: 27.4 % (ref 20–42)
MCH RBC QN AUTO: 29.4 PG (ref 26–35)
MCHC RBC AUTO-ENTMCNC: 30.7 % (ref 32–34.5)
MCV RBC AUTO: 95.8 FL (ref 80–99.9)
MONOCYTES ABSOLUTE: 0.39 E9/L (ref 0.1–0.95)
MONOCYTES RELATIVE PERCENT: 8.3 % (ref 2–12)
NEUTROPHILS ABSOLUTE: 2.95 E9/L (ref 1.8–7.3)
NEUTROPHILS RELATIVE PERCENT: 62.9 % (ref 43–80)
PDW BLD-RTO: 14.1 FL (ref 11.5–15)
PLATELET # BLD: 322 E9/L (ref 130–450)
PMV BLD AUTO: 10.1 FL (ref 7–12)
POTASSIUM SERPL-SCNC: 4.7 MMOL/L (ref 3.5–5)
RBC # BLD: 4.56 E12/L (ref 3.5–5.5)
SODIUM BLD-SCNC: 137 MMOL/L (ref 132–146)
TOTAL PROTEIN: 6.8 G/DL (ref 6.4–8.3)
TRIGL SERPL-MCNC: 70 MG/DL (ref 0–149)
TSH SERPL DL<=0.05 MIU/L-ACNC: 1.3 UIU/ML (ref 0.27–4.2)
VLDLC SERPL CALC-MCNC: 14 MG/DL
WBC # BLD: 4.7 E9/L (ref 4.5–11.5)

## 2020-09-15 ENCOUNTER — OFFICE VISIT (OUTPATIENT)
Dept: FAMILY MEDICINE CLINIC | Age: 80
End: 2020-09-15
Payer: MEDICARE

## 2020-09-15 VITALS
WEIGHT: 141 LBS | TEMPERATURE: 97.1 F | OXYGEN SATURATION: 98 % | BODY MASS INDEX: 23.49 KG/M2 | DIASTOLIC BLOOD PRESSURE: 88 MMHG | HEIGHT: 65 IN | HEART RATE: 55 BPM | SYSTOLIC BLOOD PRESSURE: 138 MMHG | RESPIRATION RATE: 16 BRPM

## 2020-09-15 PROCEDURE — 4040F PNEUMOC VAC/ADMIN/RCVD: CPT | Performed by: FAMILY MEDICINE

## 2020-09-15 PROCEDURE — 1123F ACP DISCUSS/DSCN MKR DOCD: CPT | Performed by: FAMILY MEDICINE

## 2020-09-15 PROCEDURE — G0439 PPPS, SUBSEQ VISIT: HCPCS | Performed by: FAMILY MEDICINE

## 2020-09-15 SDOH — ECONOMIC STABILITY: TRANSPORTATION INSECURITY
IN THE PAST 12 MONTHS, HAS THE LACK OF TRANSPORTATION KEPT YOU FROM MEDICAL APPOINTMENTS OR FROM GETTING MEDICATIONS?: NO

## 2020-09-15 SDOH — ECONOMIC STABILITY: INCOME INSECURITY: HOW HARD IS IT FOR YOU TO PAY FOR THE VERY BASICS LIKE FOOD, HOUSING, MEDICAL CARE, AND HEATING?: NOT HARD AT ALL

## 2020-09-15 SDOH — ECONOMIC STABILITY: TRANSPORTATION INSECURITY
IN THE PAST 12 MONTHS, HAS LACK OF TRANSPORTATION KEPT YOU FROM MEETINGS, WORK, OR FROM GETTING THINGS NEEDED FOR DAILY LIVING?: NO

## 2020-09-15 SDOH — ECONOMIC STABILITY: FOOD INSECURITY: WITHIN THE PAST 12 MONTHS, YOU WORRIED THAT YOUR FOOD WOULD RUN OUT BEFORE YOU GOT MONEY TO BUY MORE.: NEVER TRUE

## 2020-09-15 SDOH — ECONOMIC STABILITY: FOOD INSECURITY: WITHIN THE PAST 12 MONTHS, THE FOOD YOU BOUGHT JUST DIDN'T LAST AND YOU DIDN'T HAVE MONEY TO GET MORE.: NEVER TRUE

## 2020-09-15 ASSESSMENT — PATIENT HEALTH QUESTIONNAIRE - PHQ9
SUM OF ALL RESPONSES TO PHQ QUESTIONS 1-9: 0
2. FEELING DOWN, DEPRESSED OR HOPELESS: 0
SUM OF ALL RESPONSES TO PHQ9 QUESTIONS 1 & 2: 0
1. LITTLE INTEREST OR PLEASURE IN DOING THINGS: 0
SUM OF ALL RESPONSES TO PHQ QUESTIONS 1-9: 0

## 2020-09-15 ASSESSMENT — LIFESTYLE VARIABLES: HOW OFTEN DO YOU HAVE A DRINK CONTAINING ALCOHOL: 0

## 2020-09-15 NOTE — PROGRESS NOTES
Medicare Annual Wellness Visit  Name: America Lopez Date: 9/15/2020   MRN: [id number] Sex: Female   Age: 68 y.o. Ethnicity: Non-/Non    : 1940 Race: Asisatou Enriquez is here for Medicare AWV    Screenings for behavioral, psychosocial and functional/safety risks, and cognitive dysfunction are all negative except as indicated below. These results, as well as other patient data from the [address] form, are documented in Flowsheets linked to this Encounter. Allergies   Allergen Reactions    Tetracyclines & Related Rash         Prior to Visit Medications    Medication Sig Taking? Authorizing Provider   triamcinolone (KENALOG) 0.1 % cream Apply to affected area twice a day Yes Felipe Edouard MD   fish oil-omega-3 fatty acids 1000 MG capsule Take 1 g by mouth daily. Yes Historical Provider, MD   Cholecalciferol (VITAMIN D) 2000 UNITS CAPS capsule Take  by mouth. Yes Historical Provider, MD   Ascorbic Acid (VITAMIN C) 500 MG CAPS Take 1,000 mg by mouth. Yes Historical Provider, MD   Multiple Vitamins-Minerals ([employer] BONE HEALTH) TABS Take  by mouth. Yes Historical Provider, MD         Past Medical History:   Diagnosis Date    Osteopenia        Past Surgical History:   Procedure Laterality Date    JOINT REPLACEMENT Right 2010    Right total hip arthroplasty. Kay Hough. My Lorenzo MD    JOINT REPLACEMENT Left 2004    Hemiarthroplasty of the left shoulder. Kay Hough. MD Bryce    OTHER SURGICAL HISTORY Right 03/10/2009    Injection arthrogram right hip. Kay Hough.  My Lorenzo MD    THYROIDECTOMY, PARTIAL  1971    right left         Family History   Problem Relation Age of Onset    Osteoporosis Mother        CareTeam (Including outside providers/suppliers regularly involved in providing care):   Patient Care Team:  Felipe Edouard MD as PCP - Lord Ollie MD as PCP - [facility] Empaneled Provider    Wt Readings from Hearing/Vision:  No exam data present  Hearing/Vision  Do you or your family notice any trouble with your hearing?: No  Do you have difficulty driving, watching TV, or doing any of your daily activities because of your eyesight?: No  Have you had an eye exam within the past year?: (!) No  Hearing/Vision Interventions:  · Vision concerns:  patient encouraged to make appointment with his/her eye specialist    Safety:  Safety  Do you have working smoke detectors?: Yes  Have all throw rugs been removed or fastened?: (!) No  Do you have non-slip mats or surfaces in all bathtubs/showers?: Yes  Do all of your stairways have a railing or banister?: Yes  Are your doorways, halls and stairs free of clutter?: Yes  Do you always fasten your seatbelt when you are in a car?: Yes  Safety Interventions:  · Home safety tips provided    Personalized Preventive Plan   Current Health Maintenance Status    There is no immunization history on file for this patient. Health Maintenance   Topic Date Due    DTaP/Tdap/Td vaccine (1 - Tdap) 04/18/1961    Shingles Vaccine (1 of 2) 04/18/1992    Pneumococcal 65+ years Vaccine (1 of 1 - PPSV23) 04/18/2007    Annual Wellness Visit (AWV)  05/29/2019    Flu vaccine (1) 09/01/2020    DEXA (modify frequency per FRAX score)  Completed    Hepatitis A vaccine  Aged Out    Hepatitis B vaccine  Aged Out    Hib vaccine  Aged Out    Meningococcal (ACWY) vaccine  Aged Out     Recommendations for [employer] Due: see orders and patient instructions/AVS.  . Recommended screening schedule for the next 5-10 years is provided to the patient in written form: see Patient Instructions/AVS.    There are no diagnoses linked to this encounter.

## 2020-09-15 NOTE — PATIENT INSTRUCTIONS
Personalized Preventive Plan for Myrna Riddles - 9/15/2020  Medicare offers a range of preventive health benefits. Some of the tests and screenings are paid in full while other may be subject to a deductible, co-insurance, and/or copay. Some of these benefits include a comprehensive review of your medical history including lifestyle, illnesses that may run in your family, and various assessments and screenings as appropriate. After reviewing your medical record and screening and assessments performed today your provider may have ordered immunizations, labs, imaging, and/or referrals for you. A list of these orders (if applicable) as well as your Preventive Care list are included within your After Visit Summary for your review. Other Preventive Recommendations:    · A preventive eye exam performed by an eye specialist is recommended every 1-2 years to screen for glaucoma; cataracts, macular degeneration, and other eye disorders. · A preventive dental visit is recommended every 6 months. · Try to get at least 150 minutes of exercise per week or 10,000 steps per day on a pedometer . · Order or download the FREE \"Exercise & Physical Activity: Your Everyday Guide\" from [employer]. Call 1-272.258.9406 or search [employer] online. · You need 8595-8590 mg of calcium and 5805-6400 IU of vitamin D per day. It is possible to meet your calcium requirement with diet alone, but a vitamin D supplement is usually necessary to meet this goal.  · When exposed to the sun, use a sunscreen that protects against both UVA and UVB radiation with an SPF of 30 or greater. Reapply every 2 to 3 hours or after sweating, drying off with a towel, or swimming. · Always wear a seat belt when traveling in a car. Always wear a helmet when riding a bicycle or motorcycle.

## 2020-10-14 ENCOUNTER — HOSPITAL ENCOUNTER (OUTPATIENT)
Dept: GENERAL RADIOLOGY | Age: 80
Discharge: HOME | End: 2020-10-16
Payer: MEDICARE

## 2020-10-14 PROCEDURE — 77063 BREAST TOMOSYNTHESIS BI: CPT

## 2020-11-30 ENCOUNTER — TELEPHONE (OUTPATIENT)
Dept: ADMINISTRATIVE | Age: 80
End: 2020-11-30

## 2020-11-30 NOTE — TELEPHONE ENCOUNTER
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 She does not have any symptoms, but wants to come to the office for a test. Please contact pt with further instructions.

## 2020-12-01 ENCOUNTER — HOSPITAL ENCOUNTER (OUTPATIENT)
Age: 80
Discharge: HOME | End: 2020-12-01
Payer: MEDICARE

## 2020-12-02 DIAGNOSIS — Z20.822 CLOSE EXPOSURE TO COVID-19 VIRUS: ICD-10-CM

## 2020-12-03 LAB
SARS-COV-2: NOT DETECTED
SOURCE: NORMAL

## 2021-09-03 ENCOUNTER — TELEPHONE (OUTPATIENT)
Dept: FAMILY MEDICINE CLINIC | Age: 81
End: 2021-09-03

## 2021-09-03 DIAGNOSIS — E78.5 HYPERLIPIDEMIA, UNSPECIFIED HYPERLIPIDEMIA TYPE: Primary | ICD-10-CM

## 2021-09-03 DIAGNOSIS — E55.9 VITAMIN D DEFICIENCY: ICD-10-CM

## 2021-09-16 ENCOUNTER — OFFICE VISIT (OUTPATIENT)
Dept: FAMILY MEDICINE CLINIC | Age: 81
End: 2021-09-16
Payer: MEDICARE

## 2021-09-16 VITALS
DIASTOLIC BLOOD PRESSURE: 80 MMHG | WEIGHT: 142.5 LBS | SYSTOLIC BLOOD PRESSURE: 160 MMHG | RESPIRATION RATE: 16 BRPM | OXYGEN SATURATION: 98 % | HEIGHT: 65 IN | TEMPERATURE: 97.1 F | HEART RATE: 64 BPM | BODY MASS INDEX: 23.74 KG/M2

## 2021-09-16 DIAGNOSIS — E78.5 HYPERLIPIDEMIA, UNSPECIFIED HYPERLIPIDEMIA TYPE: ICD-10-CM

## 2021-09-16 DIAGNOSIS — Z00.00 ROUTINE GENERAL MEDICAL EXAMINATION AT A HEALTH CARE FACILITY: Primary | ICD-10-CM

## 2021-09-16 PROCEDURE — 1123F ACP DISCUSS/DSCN MKR DOCD: CPT | Performed by: FAMILY MEDICINE

## 2021-09-16 PROCEDURE — 4040F PNEUMOC VAC/ADMIN/RCVD: CPT | Performed by: FAMILY MEDICINE

## 2021-09-16 PROCEDURE — G0439 PPPS, SUBSEQ VISIT: HCPCS | Performed by: FAMILY MEDICINE

## 2021-09-16 SDOH — ECONOMIC STABILITY: FOOD INSECURITY: WITHIN THE PAST 12 MONTHS, THE FOOD YOU BOUGHT JUST DIDN'T LAST AND YOU DIDN'T HAVE MONEY TO GET MORE.: NEVER TRUE

## 2021-09-16 SDOH — ECONOMIC STABILITY: FOOD INSECURITY: WITHIN THE PAST 12 MONTHS, YOU WORRIED THAT YOUR FOOD WOULD RUN OUT BEFORE YOU GOT MONEY TO BUY MORE.: NEVER TRUE

## 2021-09-16 ASSESSMENT — LIFESTYLE VARIABLES
AUDIT TOTAL SCORE: 1
HOW OFTEN DO YOU HAVE A DRINK CONTAINING ALCOHOL: 1
HOW OFTEN DURING THE LAST YEAR HAVE YOU HAD A FEELING OF GUILT OR REMORSE AFTER DRINKING: 0
HOW OFTEN DURING THE LAST YEAR HAVE YOU FOUND THAT YOU WERE NOT ABLE TO STOP DRINKING ONCE YOU HAD STARTED: 0
HAVE YOU OR SOMEONE ELSE BEEN INJURED AS A RESULT OF YOUR DRINKING: 0
HOW OFTEN DURING THE LAST YEAR HAVE YOU NEEDED AN ALCOHOLIC DRINK FIRST THING IN THE MORNING TO GET YOURSELF GOING AFTER A NIGHT OF HEAVY DRINKING: 0
HOW MANY STANDARD DRINKS CONTAINING ALCOHOL DO YOU HAVE ON A TYPICAL DAY: 0
AUDIT-C TOTAL SCORE: 1
HOW OFTEN DURING THE LAST YEAR HAVE YOU FAILED TO DO WHAT WAS NORMALLY EXPECTED FROM YOU BECAUSE OF DRINKING: 0
HAS A RELATIVE, FRIEND, DOCTOR, OR ANOTHER HEALTH PROFESSIONAL EXPRESSED CONCERN ABOUT YOUR DRINKING OR SUGGESTED YOU CUT DOWN: 0
HOW OFTEN DURING THE LAST YEAR HAVE YOU BEEN UNABLE TO REMEMBER WHAT HAPPENED THE NIGHT BEFORE BECAUSE YOU HAD BEEN DRINKING: 0
HOW OFTEN DO YOU HAVE SIX OR MORE DRINKS ON ONE OCCASION: 0

## 2021-09-16 ASSESSMENT — PATIENT HEALTH QUESTIONNAIRE - PHQ9
SUM OF ALL RESPONSES TO PHQ QUESTIONS 1-9: 0
1. LITTLE INTEREST OR PLEASURE IN DOING THINGS: 0
SUM OF ALL RESPONSES TO PHQ QUESTIONS 1-9: 0
2. FEELING DOWN, DEPRESSED OR HOPELESS: 0
SUM OF ALL RESPONSES TO PHQ QUESTIONS 1-9: 0
SUM OF ALL RESPONSES TO PHQ9 QUESTIONS 1 & 2: 0

## 2021-09-16 ASSESSMENT — SOCIAL DETERMINANTS OF HEALTH (SDOH): HOW HARD IS IT FOR YOU TO PAY FOR THE VERY BASICS LIKE FOOD, HOUSING, MEDICAL CARE, AND HEATING?: NOT HARD AT ALL

## 2021-09-16 NOTE — PROGRESS NOTES
Medicare Annual Wellness Visit  Name: Abiel Valderrama Date: 2021   MRN: [id number] Sex: Female   Age: 80 y.o. Ethnicity: Non- / Non    : 1940 Race: White (non-)      Jalil Pacheco is here for Medicare AWV and Discuss Labs    Screenings for behavioral, psychosocial and functional/safety risks, and cognitive dysfunction are all negative except as indicated below. These results, as well as other patient data from the [address] form, are documented in Flowsheets linked to this Encounter. BP at home is 120/80. Allergies   Allergen Reactions    Tetracyclines & Related Rash         Prior to Visit Medications    Medication Sig Taking? Authorizing Provider   Cholecalciferol (VITAMIN D) 2000 UNITS CAPS capsule Take  by mouth. Yes Historical Provider, MD   Ascorbic Acid (VITAMIN C) 500 MG CAPS Take 1,000 mg by mouth. Yes Historical Provider, MD   Multiple Vitamins-Minerals ([employer] BONE HEALTH) TABS Take  by mouth. Yes Historical Provider, MD   triamcinolone (KENALOG) 0.1 % cream Apply to affected area twice a day  Patient not taking: Reported on 2021  Ricky Maciel MD   fish oil-omega-3 fatty acids 1000 MG capsule Take 1 g by mouth daily. Patient not taking: Reported on 2021  Historical Provider, MD         Past Medical History:   Diagnosis Date    Osteopenia        Past Surgical History:   Procedure Laterality Date    JOINT REPLACEMENT Right 2010    Right total hip arthroplasty. Antonio Lazo. Jose Moctezuma MD    JOINT REPLACEMENT Left 2004    Hemiarthroplasty of the left shoulder. Antonio Lazo. MD Bryce    OTHER SURGICAL HISTORY Right 03/10/2009    Injection arthrogram right hip. Antonio Lazo.  MD Bryce    THYROIDECTOMY, PARTIAL  1971    right left         Family History   Problem Relation Age of Onset    Osteoporosis Mother        CareTeam (Including outside providers/suppliers regularly involved in providing care): Patient Care Team:  Earnest Sanchez MD as PCP - Omar Parrish MD as PCP - Alaina Corbett Provider    Wt Readings from Last 3 Encounters:   09/16/21 142 lb 8 oz (64.6 kg)   09/15/20 141 lb (64 kg)   03/19/20 150 lb (68 kg)     Vitals:    09/16/21 1021 09/16/21 1025   BP: (!) 192/83 (!) 192/79   Pulse: 64    Resp: 16    Temp: 97.1 °F (36.2 °C)    TempSrc: Temporal    SpO2: 98%    Weight: 142 lb 8 oz (64.6 kg)    Height: 5' 5\" (1.651 m)      Body mass index is 23.71 kg/m². Based upon direct observation of the patient, evaluation of cognition reveals recent and remote memory intact. General Appearance: alert and oriented to person, place and time, well developed and well- nourished, in no acute distress  Skin: warm and dry, no rash or erythema  Head: normocephalic and atraumatic  Eyes: pupils equal, round, and reactive to light, extraocular eye movements intact, conjunctivae normal  ENT: tympanic membrane, external ear and ear canal normal bilaterally, nose without deformity, nasal mucosa and turbinates normal without polyps  Neck: supple and non-tender without mass, no thyromegaly or thyroid nodules, no cervical lymphadenopathy  Pulmonary/Chest: clear to auscultation bilaterally- no wheezes, rales or rhonchi, normal air movement, no respiratory distress  Cardiovascular: normal rate, regular rhythm, normal S1 and S2, no murmurs, rubs, clicks, or gallops, distal pulses intact, no carotid bruits  Abdomen: soft, non-tender, non-distended, normal bowel sounds, no masses or organomegaly  Extremities: no cyanosis, clubbing or edema  Musculoskeletal: normal range of motion, no joint swelling, deformity or tenderness  Neurologic: reflexes normal and symmetric, no cranial nerve deficit, gait, coordination and speech normal    Patient's complete Health Risk Assessment and screening values have been reviewed and are found in Flowsheets.  The following problems were reviewed today and where indicated

## 2021-09-16 NOTE — PATIENT INSTRUCTIONS
Personalized Preventive Plan for Myrna Barry - 9/16/2021  Medicare offers a range of preventive health benefits. Some of the tests and screenings are paid in full while other may be subject to a deductible, co-insurance, and/or copay. Some of these benefits include a comprehensive review of your medical history including lifestyle, illnesses that may run in your family, and various assessments and screenings as appropriate. After reviewing your medical record and screening and assessments performed today your provider may have ordered immunizations, labs, imaging, and/or referrals for you. A list of these orders (if applicable) as well as your Preventive Care list are included within your After Visit Summary for your review. Other Preventive Recommendations:    · A preventive eye exam performed by an eye specialist is recommended every 1-2 years to screen for glaucoma; cataracts, macular degeneration, and other eye disorders. · A preventive dental visit is recommended every 6 months. · Try to get at least 150 minutes of exercise per week or 10,000 steps per day on a pedometer . · Order or download the FREE \"Exercise & Physical Activity: Your Everyday Guide\" from [employer]. Call 1-644.917.4637 or search [employer] online. · You need 4391-0993 mg of calcium and 9918-1562 IU of vitamin D per day. It is possible to meet your calcium requirement with diet alone, but a vitamin D supplement is usually necessary to meet this goal.  · When exposed to the sun, use a sunscreen that protects against both UVA and UVB radiation with an SPF of 30 or greater. Reapply every 2 to 3 hours or after sweating, drying off with a towel, or swimming. · Always wear a seat belt when traveling in a car. Always wear a helmet when riding a bicycle or motorcycle.

## 2022-02-01 DIAGNOSIS — Z12.31 ENCOUNTER FOR SCREENING MAMMOGRAM FOR BREAST CANCER: Primary | ICD-10-CM

## 2022-02-11 ENCOUNTER — HOSPITAL ENCOUNTER (OUTPATIENT)
Dept: GENERAL RADIOLOGY | Age: 82
Discharge: HOME | End: 2022-02-13
Payer: MEDICARE

## 2022-02-11 DIAGNOSIS — Z12.31 ENCOUNTER FOR SCREENING MAMMOGRAM FOR BREAST CANCER: ICD-10-CM

## 2022-02-11 PROCEDURE — 77063 BREAST TOMOSYNTHESIS BI: CPT

## 2022-06-29 DIAGNOSIS — E55.9 VITAMIN D DEFICIENCY: ICD-10-CM

## 2022-06-29 DIAGNOSIS — R53.83 FATIGUE, UNSPECIFIED TYPE: ICD-10-CM

## 2022-06-29 DIAGNOSIS — E78.5 HYPERLIPIDEMIA, UNSPECIFIED HYPERLIPIDEMIA TYPE: Primary | ICD-10-CM

## 2022-09-16 DIAGNOSIS — E78.5 HYPERLIPIDEMIA, UNSPECIFIED HYPERLIPIDEMIA TYPE: ICD-10-CM

## 2022-09-16 DIAGNOSIS — E55.9 VITAMIN D DEFICIENCY: ICD-10-CM

## 2022-09-16 DIAGNOSIS — R53.83 FATIGUE, UNSPECIFIED TYPE: ICD-10-CM

## 2022-09-16 LAB
ALBUMIN SERPL-MCNC: 4.3 G/DL (ref 3.5–5.2)
ALP BLD-CCNC: 89 U/L (ref 35–104)
ALT SERPL-CCNC: 6 U/L (ref 0–32)
ANION GAP SERPL CALCULATED.3IONS-SCNC: 14 MMOL/L (ref 7–16)
AST SERPL-CCNC: 15 U/L (ref 0–31)
BASOPHILS ABSOLUTE: 0.02 E9/L (ref 0–0.2)
BASOPHILS RELATIVE PERCENT: 0.4 % (ref 0–2)
BILIRUB SERPL-MCNC: 0.5 MG/DL (ref 0–1.2)
BUN BLDV-MCNC: 15 MG/DL (ref 6–23)
CALCIUM SERPL-MCNC: 9.4 MG/DL (ref 8.6–10.2)
CHLORIDE BLD-SCNC: 102 MMOL/L (ref 98–107)
CHOLESTEROL, TOTAL: 285 MG/DL (ref 0–199)
CO2: 22 MMOL/L (ref 22–29)
CREAT SERPL-MCNC: 0.9 MG/DL (ref 0.5–1)
EOSINOPHILS ABSOLUTE: 0.04 E9/L (ref 0.05–0.5)
EOSINOPHILS RELATIVE PERCENT: 0.9 % (ref 0–6)
GFR AFRICAN AMERICAN: >60
GFR NON-AFRICAN AMERICAN: >60 ML/MIN/1.73
GLUCOSE BLD-MCNC: 92 MG/DL (ref 74–99)
HCT VFR BLD CALC: 41.7 % (ref 34–48)
HDLC SERPL-MCNC: 83 MG/DL
HEMOGLOBIN: 13.8 G/DL (ref 11.5–15.5)
IMMATURE GRANULOCYTES #: 0.01 E9/L
IMMATURE GRANULOCYTES %: 0.2 % (ref 0–5)
LDL CHOLESTEROL CALCULATED: 184 MG/DL (ref 0–99)
LYMPHOCYTES ABSOLUTE: 1.27 E9/L (ref 1.5–4)
LYMPHOCYTES RELATIVE PERCENT: 28 % (ref 20–42)
MCH RBC QN AUTO: 30.1 PG (ref 26–35)
MCHC RBC AUTO-ENTMCNC: 33.1 % (ref 32–34.5)
MCV RBC AUTO: 91 FL (ref 80–99.9)
MONOCYTES ABSOLUTE: 0.38 E9/L (ref 0.1–0.95)
MONOCYTES RELATIVE PERCENT: 8.4 % (ref 2–12)
NEUTROPHILS ABSOLUTE: 2.82 E9/L (ref 1.8–7.3)
NEUTROPHILS RELATIVE PERCENT: 62.1 % (ref 43–80)
PDW BLD-RTO: 13.8 FL (ref 11.5–15)
PLATELET # BLD: 315 E9/L (ref 130–450)
PMV BLD AUTO: 10 FL (ref 7–12)
POTASSIUM SERPL-SCNC: 4.2 MMOL/L (ref 3.5–5)
RBC # BLD: 4.58 E12/L (ref 3.5–5.5)
SODIUM BLD-SCNC: 138 MMOL/L (ref 132–146)
TOTAL PROTEIN: 7.1 G/DL (ref 6.4–8.3)
TRIGL SERPL-MCNC: 90 MG/DL (ref 0–149)
TSH SERPL DL<=0.05 MIU/L-ACNC: 1.01 UIU/ML (ref 0.27–4.2)
VITAMIN D 25-HYDROXY: 55 NG/ML (ref 30–100)
VLDLC SERPL CALC-MCNC: 18 MG/DL
WBC # BLD: 4.5 E9/L (ref 4.5–11.5)

## 2022-09-23 ENCOUNTER — OFFICE VISIT (OUTPATIENT)
Dept: FAMILY MEDICINE CLINIC | Age: 82
End: 2022-09-23
Payer: MEDICARE

## 2022-09-23 VITALS
RESPIRATION RATE: 18 BRPM | WEIGHT: 122.1 LBS | HEIGHT: 65 IN | TEMPERATURE: 97.3 F | DIASTOLIC BLOOD PRESSURE: 80 MMHG | BODY MASS INDEX: 20.34 KG/M2 | OXYGEN SATURATION: 97 % | SYSTOLIC BLOOD PRESSURE: 142 MMHG | HEART RATE: 60 BPM

## 2022-09-23 DIAGNOSIS — Z00.00 MEDICARE ANNUAL WELLNESS VISIT, SUBSEQUENT: Primary | ICD-10-CM

## 2022-09-23 PROCEDURE — G0439 PPPS, SUBSEQ VISIT: HCPCS | Performed by: FAMILY MEDICINE

## 2022-09-23 PROCEDURE — 1123F ACP DISCUSS/DSCN MKR DOCD: CPT | Performed by: FAMILY MEDICINE

## 2022-09-23 SDOH — ECONOMIC STABILITY: FOOD INSECURITY: WITHIN THE PAST 12 MONTHS, YOU WORRIED THAT YOUR FOOD WOULD RUN OUT BEFORE YOU GOT MONEY TO BUY MORE.: NEVER TRUE

## 2022-09-23 SDOH — ECONOMIC STABILITY: FOOD INSECURITY: WITHIN THE PAST 12 MONTHS, THE FOOD YOU BOUGHT JUST DIDN'T LAST AND YOU DIDN'T HAVE MONEY TO GET MORE.: NEVER TRUE

## 2022-09-23 ASSESSMENT — PATIENT HEALTH QUESTIONNAIRE - PHQ9
SUM OF ALL RESPONSES TO PHQ QUESTIONS 1-9: 0
SUM OF ALL RESPONSES TO PHQ QUESTIONS 1-9: 0
1. LITTLE INTEREST OR PLEASURE IN DOING THINGS: 0
SUM OF ALL RESPONSES TO PHQ9 QUESTIONS 1 & 2: 0
2. FEELING DOWN, DEPRESSED OR HOPELESS: 0
SUM OF ALL RESPONSES TO PHQ QUESTIONS 1-9: 0
SUM OF ALL RESPONSES TO PHQ QUESTIONS 1-9: 0

## 2022-09-23 ASSESSMENT — SOCIAL DETERMINANTS OF HEALTH (SDOH): HOW HARD IS IT FOR YOU TO PAY FOR THE VERY BASICS LIKE FOOD, HOUSING, MEDICAL CARE, AND HEATING?: NOT HARD AT ALL

## 2022-09-23 ASSESSMENT — LIFESTYLE VARIABLES
HOW OFTEN DO YOU HAVE A DRINK CONTAINING ALCOHOL: NEVER
HOW MANY STANDARD DRINKS CONTAINING ALCOHOL DO YOU HAVE ON A TYPICAL DAY: PATIENT DOES NOT DRINK

## 2022-09-23 NOTE — PROGRESS NOTES
Medicare Annual Wellness Visit    Billy Carr is here for Medicare AWV (Pt has no questions or concerns at this time. HM reviewed. Pt declined a flu vaccine today. Labs were completed 9/16/22.)    Assessment & Plan   Medicare annual wellness visit, subsequent    Recommendations for Preventive Services Due: see orders and patient instructions/AVS.  Recommended screening schedule for the next 5-10 years is provided to the patient in written form: see Patient Instructions/AVS.     Return for Medicare Annual Wellness Visit in 1 year. Subjective       Patient's complete Health Risk Assessment and screening values have been reviewed and are found in Flowsheets. The following problems were reviewed today and where indicated follow up appointments were made and/or referrals ordered. Positive Risk Factor Screenings with Interventions:               Hearing/Vision:  Do you or your family notice any trouble with your hearing that hasn't been managed with hearing aids?: No  Do you have difficulty driving, watching TV, or doing any of your daily activities because of your eyesight?: No  Have you had an eye exam within the past year?: (!) No  No results found. Hearing/Vision Interventions:  Vision concerns:  patient encouraged to make appointment with his/her eye specialist            Objective   Vitals:    09/23/22 1010 09/23/22 1028   BP: (!) 185/80 (!) 142/80   Pulse: 60    Resp: 18    Temp: 97.3 °F (36.3 °C)    SpO2: 97%    Weight: 122 lb 1.6 oz (55.4 kg)    Height: 5' 5\" (1.651 m)       Body mass index is 20.32 kg/m².       General Appearance: alert and oriented to person, place and time, well developed and well- nourished, in no acute distress  Skin: warm and dry, no rash or erythema  Head: normocephalic and atraumatic  Eyes: pupils equal, round, and reactive to light, extraocular eye movements intact, conjunctivae normal  ENT: tympanic membrane, external ear and ear canal normal bilaterally, nose without

## 2022-09-23 NOTE — PATIENT INSTRUCTIONS
Personalized Preventive Plan for Stew Anthony - 9/23/2022  Medicare offers a range of preventive health benefits. Some of the tests and screenings are paid in full while other may be subject to a deductible, co-insurance, and/or copay. Some of these benefits include a comprehensive review of your medical history including lifestyle, illnesses that may run in your family, and various assessments and screenings as appropriate. After reviewing your medical record and screening and assessments performed today your provider may have ordered immunizations, labs, imaging, and/or referrals for you. A list of these orders (if applicable) as well as your Preventive Care list are included within your After Visit Summary for your review. Other Preventive Recommendations:    A preventive eye exam performed by an eye specialist is recommended every 1-2 years to screen for glaucoma; cataracts, macular degeneration, and other eye disorders. A preventive dental visit is recommended every 6 months. Try to get at least 150 minutes of exercise per week or 10,000 steps per day on a pedometer . Order or download the FREE \"Exercise & Physical Activity: Your Everyday Guide\" from [employer]. Call 7-115.549.8054 or search [employer] online. You need 0200-5691 mg of calcium and 9433-3323 IU of vitamin D per day. It is possible to meet your calcium requirement with diet alone, but a vitamin D supplement is usually necessary to meet this goal.  When exposed to the sun, use a sunscreen that protects against both UVA and UVB radiation with an SPF of 30 or greater. Reapply every 2 to 3 hours or after sweating, drying off with a towel, or swimming. Always wear a seat belt when traveling in a car. Always wear a helmet when riding a bicycle or motorcycle.

## 2022-10-04 DIAGNOSIS — Z12.31 BREAST CANCER SCREENING BY MAMMOGRAM: Primary | ICD-10-CM

## 2023-02-05 ENCOUNTER — APPOINTMENT (OUTPATIENT)
Dept: CT IMAGING | Age: 83
DRG: 494 | End: 2023-02-05
Payer: MEDICARE

## 2023-02-05 ENCOUNTER — APPOINTMENT (OUTPATIENT)
Dept: GENERAL RADIOLOGY | Age: 83
DRG: 494 | End: 2023-02-05
Payer: MEDICARE

## 2023-02-05 ENCOUNTER — HOSPITAL ENCOUNTER (INPATIENT)
Age: 83
LOS: 3 days | Discharge: NURSING, REHAB OR LONG-TERM CARE | DRG: 494 | End: 2023-02-08
Attending: EMERGENCY MEDICINE | Admitting: INTERNAL MEDICINE
Payer: MEDICARE

## 2023-02-05 DIAGNOSIS — S82.141A TIBIAL PLATEAU FRACTURE, RIGHT, CLOSED, INITIAL ENCOUNTER: Primary | ICD-10-CM

## 2023-02-05 DIAGNOSIS — S82.401A CLOSED FRACTURE OF RIGHT TIBIA AND FIBULA, INITIAL ENCOUNTER: ICD-10-CM

## 2023-02-05 DIAGNOSIS — S82.201A CLOSED FRACTURE OF RIGHT TIBIA AND FIBULA, INITIAL ENCOUNTER: ICD-10-CM

## 2023-02-05 LAB
ABO/RH: NORMAL
ANTIBODY SCREEN: NORMAL
APTT: 30.8 SEC (ref 24.5–35.1)
INR BLD: 1
PROTHROMBIN TIME: 10.4 SEC (ref 9.3–12.4)

## 2023-02-05 PROCEDURE — 73700 CT LOWER EXTREMITY W/O DYE: CPT

## 2023-02-05 PROCEDURE — 2580000003 HC RX 258: Performed by: NURSE PRACTITIONER

## 2023-02-05 PROCEDURE — 73564 X-RAY EXAM KNEE 4 OR MORE: CPT

## 2023-02-05 PROCEDURE — 73590 X-RAY EXAM OF LOWER LEG: CPT

## 2023-02-05 PROCEDURE — 86900 BLOOD TYPING SEROLOGIC ABO: CPT

## 2023-02-05 PROCEDURE — 86901 BLOOD TYPING SEROLOGIC RH(D): CPT

## 2023-02-05 PROCEDURE — 1200000000 HC SEMI PRIVATE

## 2023-02-05 PROCEDURE — 85730 THROMBOPLASTIN TIME PARTIAL: CPT

## 2023-02-05 PROCEDURE — 99285 EMERGENCY DEPT VISIT HI MDM: CPT

## 2023-02-05 PROCEDURE — 86850 RBC ANTIBODY SCREEN: CPT

## 2023-02-05 PROCEDURE — 6370000000 HC RX 637 (ALT 250 FOR IP): Performed by: NURSE PRACTITIONER

## 2023-02-05 PROCEDURE — 85610 PROTHROMBIN TIME: CPT

## 2023-02-05 PROCEDURE — 73552 X-RAY EXAM OF FEMUR 2/>: CPT

## 2023-02-05 PROCEDURE — 36415 COLL VENOUS BLD VENIPUNCTURE: CPT

## 2023-02-05 RX ORDER — ACETAMINOPHEN 325 MG/1
650 TABLET ORAL EVERY 6 HOURS PRN
Status: DISCONTINUED | OUTPATIENT
Start: 2023-02-05 | End: 2023-02-08 | Stop reason: HOSPADM

## 2023-02-05 RX ORDER — OXYCODONE HYDROCHLORIDE AND ACETAMINOPHEN 5; 325 MG/1; MG/1
1 TABLET ORAL EVERY 4 HOURS PRN
Status: DISCONTINUED | OUTPATIENT
Start: 2023-02-05 | End: 2023-02-08 | Stop reason: HOSPADM

## 2023-02-05 RX ORDER — SODIUM CHLORIDE 0.9 % (FLUSH) 0.9 %
5-40 SYRINGE (ML) INJECTION EVERY 12 HOURS SCHEDULED
Status: DISCONTINUED | OUTPATIENT
Start: 2023-02-05 | End: 2023-02-08 | Stop reason: HOSPADM

## 2023-02-05 RX ORDER — ONDANSETRON 4 MG/1
4 TABLET, ORALLY DISINTEGRATING ORAL EVERY 8 HOURS PRN
Status: DISCONTINUED | OUTPATIENT
Start: 2023-02-05 | End: 2023-02-08 | Stop reason: HOSPADM

## 2023-02-05 RX ORDER — ONDANSETRON 2 MG/ML
4 INJECTION INTRAMUSCULAR; INTRAVENOUS EVERY 6 HOURS PRN
Status: DISCONTINUED | OUTPATIENT
Start: 2023-02-05 | End: 2023-02-08 | Stop reason: HOSPADM

## 2023-02-05 RX ORDER — POLYETHYLENE GLYCOL 3350 17 G/17G
17 POWDER, FOR SOLUTION ORAL DAILY PRN
Status: DISCONTINUED | OUTPATIENT
Start: 2023-02-05 | End: 2023-02-08 | Stop reason: HOSPADM

## 2023-02-05 RX ORDER — SODIUM CHLORIDE 9 MG/ML
INJECTION, SOLUTION INTRAVENOUS PRN
Status: DISCONTINUED | OUTPATIENT
Start: 2023-02-05 | End: 2023-02-08 | Stop reason: HOSPADM

## 2023-02-05 RX ORDER — SODIUM CHLORIDE 9 MG/ML
INJECTION, SOLUTION INTRAVENOUS CONTINUOUS
Status: DISCONTINUED | OUTPATIENT
Start: 2023-02-05 | End: 2023-02-08 | Stop reason: HOSPADM

## 2023-02-05 RX ORDER — ACETAMINOPHEN 650 MG/1
650 SUPPOSITORY RECTAL EVERY 6 HOURS PRN
Status: DISCONTINUED | OUTPATIENT
Start: 2023-02-05 | End: 2023-02-08 | Stop reason: HOSPADM

## 2023-02-05 RX ORDER — SODIUM CHLORIDE 0.9 % (FLUSH) 0.9 %
10 SYRINGE (ML) INJECTION PRN
Status: DISCONTINUED | OUTPATIENT
Start: 2023-02-05 | End: 2023-02-08 | Stop reason: HOSPADM

## 2023-02-05 RX ADMIN — OXYCODONE AND ACETAMINOPHEN 1 TABLET: 5; 325 TABLET ORAL at 21:48

## 2023-02-05 RX ADMIN — OXYCODONE AND ACETAMINOPHEN 1 TABLET: 5; 325 TABLET ORAL at 16:55

## 2023-02-05 RX ADMIN — SODIUM CHLORIDE: 9 INJECTION, SOLUTION INTRAVENOUS at 16:58

## 2023-02-05 ASSESSMENT — PAIN SCALES - GENERAL
PAINLEVEL_OUTOF10: 5
PAINLEVEL_OUTOF10: 7
PAINLEVEL_OUTOF10: 6

## 2023-02-05 ASSESSMENT — PAIN DESCRIPTION - PAIN TYPE: TYPE: ACUTE PAIN

## 2023-02-05 ASSESSMENT — PAIN DESCRIPTION - DESCRIPTORS: DESCRIPTORS: ACHING;DULL;DISCOMFORT

## 2023-02-05 ASSESSMENT — PAIN DESCRIPTION - LOCATION: LOCATION: ARM

## 2023-02-05 ASSESSMENT — PAIN DESCRIPTION - ORIENTATION: ORIENTATION: RIGHT

## 2023-02-05 ASSESSMENT — PAIN DESCRIPTION - FREQUENCY: FREQUENCY: CONTINUOUS

## 2023-02-05 NOTE — ED NOTES
Contacted unit spoke with Hector johnson sent on fax patient in transport      [facility]  02/05/23 2833

## 2023-02-05 NOTE — ED PROVIDER NOTES
[address] 61        Pt Name: Emily Woody  MRN: [id number]  [place] 1940  Date of evaluation: 2/5/2023  Provider: Carolyn Mcadams MD  PCP: Armin Monson MD  Note Started: 1:14 PM EST 2/5/23    CHIEF COMPLAINT       Chief Complaint   Patient presents with    Dana Slate down one step, landed on right knee, swelling to shin, - thinners        HISTORY OF PRESENT ILLNESS: 1 or more Elements        Limitations to history : None    Emily Woody is a [de-identified] y.o. female who presents for fall and right leg injury. Patient reports she fell down 1 step and landed on her right knee. Unable to walk afterwards. Swelling to her knee. She complains of knee and proximal shin pain. Did not hit her head, no loss of conscious. She denies any other injuries. No neck or back pain. No chest pain or shortness of breath. No numbness or tingling or weakness or paralysis. Nursing Notes were all reviewed and agreed with or any disagreements were addressed in the HPI. REVIEW OF EXTERNAL NOTE :       EMS run report    Controlled Substance Monitoring:    Acute and Chronic Pain Monitoring:   No flowsheet data found. REVIEW OF SYSTEMS :      Positives and Pertinent negatives as per HPI. SURGICAL HISTORY     Past Surgical History:   Procedure Laterality Date    BREAST BIOPSY      JOINT REPLACEMENT Right 02/22/2010    Right total hip arthroplasty. Isadora Reyes. Ana Maria Sanchez MD    JOINT REPLACEMENT Left 11/05/2004    Hemiarthroplasty of the left shoulder. Isadora Reyes. MD Bryce    OTHER SURGICAL HISTORY Right 03/10/2009    Injection arthrogram right hip. Isadora Wu MD    THYROIDECTOMY, PARTIAL  1971    right left       CURRENTMEDICATIONS       Current Discharge Medication List        CONTINUE these medications which have NOT CHANGED    Details   Cholecalciferol (VITAMIN D) 2000 UNITS CAPS capsule Take  by mouth.         Ascorbic Acid (VITAMIN C) 500 MG CAPS Take 1,000 mg by mouth. Multiple Vitamins-Minerals ([employer] BONE HEALTH) TABS Take  by mouth. ALLERGIES     Tetracyclines & related    FAMILYHISTORY       Family History   Problem Relation Age of Onset    Osteoporosis Mother     Breast Cancer Maternal Aunt     Breast Cancer Maternal Aunt         SOCIAL HISTORY       Social History     Tobacco Use    Smoking status: Never    Smokeless tobacco: Never   Substance Use Topics    Alcohol use: No    Drug use: No       SCREENINGS        Robyn Coma Scale  Eye Opening: Spontaneous  Best Verbal Response: Oriented  Best Motor Response: Obeys commands  Robyn Coma Scale Score: 15                CIWA Assessment  BP: (!) 185/73  Heart Rate: 98           PHYSICAL EXAM  1 or more Elements     ED Triage Vitals   BP Temp Temp src Pulse Resp SpO2 Height Weight   -- -- -- -- -- -- -- --              Constitutional/General: Alert and oriented x3  Head: Normocephalic and atraumatic  Eyes: PERRL, EOMI, conjunctiva normal, sclera non icteric  ENT:  Oropharynx clear, handling secretions  Neck: Supple, full ROM, no stridor, no meningeal signs  Respiratory: Lungs clear to auscultation bilaterally, no wheezes, rales, or rhonchi. Not in respiratory distress  Cardiovascular:  Regular rate. Regular rhythm. No murmurs, no gallops, no rubs. 2+ distal pulses. Equal extremity pulses. Chest: No chest wall tenderness  GI:  Abdomen Soft, Non tender, Non distended. No rebound, guarding, or rigidity. No pulsatile masses. Musculoskeletal: Moves all extremities x 4. Warm and well perfused, no clubbing, no cyanosis, no edema. Capillary refill <3 seconds    Right lower extremity: Diffuse swelling along the distal knee and proximal tibia. Bruising as well. Unable to fully flex or extend secondary to pain. No lacerations or signs of open fracture. No tenderness at the hip. No foot or ankle tenderness. No distal tibia tenderness. Proximal fibular head tenderness. Neurovascularly intact distally. 2+ DP/PT pulses. Capillary refill <3 secondary. Warm and well perfused. Sural, saphenous, deep peroneal, peroneal, and tibial nerves intact. Sensation intact. 5/5 strength. Achilies tendon intact. No evidence of compartment syndrome. Integument: skin warm and dry. No rashes. Neurologic: GCS 15, no focal deficits  Psychiatric: Normal Affect            DIAGNOSTIC RESULTS   LABS:    Labs Reviewed   PROTIME-INR   APTT   TYPE AND SCREEN       As interpreted by me, the above displayed labs are abnormal. All other labs obtained during this visit were within normal range or not returned as of this dictation. RADIOLOGY:   Unless a wet read is documented in the ED course or MDM, non-plain film images such as CT, Ultrasound and MRI are read by the radiologist unless otherwise specified in the medical decision-making. Plain radiographic images are preliminarily interpreted by this ED Provider with the findings documented in the ED Course with official radiology read to follow. Interpretation per the Radiologist below, if available at the time of this note:    XR TIBIA FIBULA RIGHT (2 VIEWS)   Final Result   Depressed fracture of the lateral tibial plateau, minimally depressed   fracture of the proximal fibula. XR FEMUR RIGHT (MIN 2 VIEWS)   Final Result   No acute femoral fracture, proximal tibial fracture and fibular fracture   noted. XR KNEE RIGHT (MIN 4 VIEWS)   Final Result   1. Comminuted intra-articular fracture of the proximal tibia involving the   lateral tibial plateau with depression of the lateral tibial plateau   2. Nondisplaced fracture through the head of the fibula. 3. Dedicated CT of the right knee is recommended for further evaluation. No results found. No results found. PAST MEDICAL HISTORY/Chronic Conditions Affecting Care      has a past medical history of Osteopenia.      EMERGENCY DEPARTMENT COURSE    Vitals:    Vitals: 02/05/23 1318 02/05/23 1503 02/05/23 1541 02/05/23 1608   BP: (!) 155/107 (!) 179/88  (!) 185/73   Pulse: 91   98   Resp: 16   16   Temp: 97.6 °F (36.4 °C)   99.1 °F (37.3 °C)   TempSrc:    Oral   SpO2: 96% 97%  94%   Weight:   124 lb (56.2 kg)    Height:   5' 5\" (1.651 m)        Patient was given the following medications:  Medications   0.9 % sodium chloride infusion (has no administration in time range)   sodium chloride flush 0.9 % injection 5-40 mL (has no administration in time range)   sodium chloride flush 0.9 % injection 10 mL (has no administration in time range)   0.9 % sodium chloride infusion (has no administration in time range)   ondansetron (ZOFRAN-ODT) disintegrating tablet 4 mg (has no administration in time range)     Or   ondansetron (ZOFRAN) injection 4 mg (has no administration in time range)   polyethylene glycol (GLYCOLAX) packet 17 g (has no administration in time range)   acetaminophen (TYLENOL) tablet 650 mg (has no administration in time range)     Or   acetaminophen (TYLENOL) suppository 650 mg (has no administration in time range)   oxyCODONE-acetaminophen (PERCOCET) 5-325 MG per tablet 1 tablet (has no administration in time range)                Medical Decision Making/Differential Diagnosis:    CC/HPI Summary, Social Determinants of health, Records Reviewed, DDx, testing done/not done, ED Course, Reassessment, disposition considerations/shared decision making with patient, consults, disposition:             Medical Decision Making  Fall with knee injury differential includes but not limited to fracture, dislocation, contusion. X-rays per my wet read interpretation of the tibia and fibula show tibial plateau fracture with some impaction, also proximal fibular head fracture. The fracture is closed. Neurologically intact. Compartments are soft and compressible. Orthopedics consulted. Internal medicine consulted for admission. She declined IV pain medication.   It was offered. Problems Addressed:  Closed fracture of right tibia and fibula, initial encounter: acute illness or injury  Tibial plateau fracture, right, closed, initial encounter: acute illness or injury    Amount and/or Complexity of Data Reviewed  Radiology: ordered and independent interpretation performed. Discussion of management or test interpretation with external provider(s): Ortho  Internal Medicine    Risk  Decision regarding hospitalization. CONSULTS:   IP CONSULT TO ORTHOPEDIC SURGERY  IP CONSULT TO INTERNAL MEDICINE  IP CONSULT TO ANESTHESIOLOGY         PROCEDURES   Unless otherwise noted below, none       CRITICAL CARE TIME (.cct)            I am the Primary Clinician of Record. FINAL IMPRESSION      1. Tibial plateau fracture, right, closed, initial encounter    2. Closed fracture of right tibia and fibula, initial encounter          DISPOSITION/PLAN     DISPOSITION Admitted 02/05/2023 02:56:25 PM      PATIENT REFERRED TO:  No follow-up provider specified.     DISCHARGE MEDICATIONS:  Current Discharge Medication List          DISCONTINUED MEDICATIONS:  Current Discharge Medication List                 (Please note that portions of this note were completed with a voice recognition program.  Efforts were made to edit the dictations but occasionally words are mis-transcribed.)    Lisa Buitrago MD (electronically signed)            Jeb Kelley MD  02/05/23 4939

## 2023-02-05 NOTE — LETTER
[address] Medicaid  CERTIFICATION OF NECESSITY  FOR TRANSPORTATION   BY WHEELCHAIR VAN     Individual Information   1. Name: Romeo Sen 2. [place] Medicaid Billing Number:    3. Address: [place] [id number]      Transportation Provider Information   4. Provider Name:    5. [place] Medicaid Provider Number:  National Provider Identifier (NPI):      Certification  7. Criteria:  By signing this document, the practitioner certifies that two statements are true:  A. This individual must be accompanied by a mobility-related assistive device from the point of pick-up to the point of drop-off. B. Transport of this individual by standard passenger vehicle or common carrier is precluded or contraindicated. 8. Period Beginning Date:    5. Length  [x] Not more than 1 day(s)  [] One Year     Additional Information Relevant to Certification   10. Comments or Explanations, If Necessary or Appropriate     Fracture right tibial plateau w/ external fixation     Certifying Practitioner Information   11. Name of Practitioner: Asha Alcantar DO   12. [place] Medicaid Provider Number, If Applicable:  [address] Provider Identifier (NPI):      Signature Information   14. Date of Signature:    13. Name of Person Signing: ***   12. Signature and Professional Designation: ***     DAWNA [id number]  Rev. 7/2015          [facility] [address] Encounter Date/Time: 2/5/2023 2650 [facility] Account: [de-identified]    MRN: [id number]    Patient: Romeo Sen    Contact Serial #: [id number]      ENCOUNTER          Patient Class: I Private Enc?   No Unit RM BD: [id number]   Hospital Service: ORT   Encounter DX: Closed fracture of right*   ADM Provider:     Procedure:     ATT Provider: Asha Alcantar DO   REF Provider:        Admission DX: Closed fracture of right tibia and fibula, initial encounter, Tibial plateau fracture, right, closed, initial encounter and DX codes: S82.201A, S82.401A, Z76.163H    PATIENT                 Name: Francie Barragan : 1940 (82 yrs)   Address: [address] Sex: Female   City: [place] [address]         Marital Status:    Employer: RETIRED         [employer]: Bahai   Primary Care Provider: Daniel Alicea MD         Primary Phone: [phone]   EMERGENCY CONTACT   Contact Name Legal Guardian? Relationship to Patient Home Phone Work Phone   1. Jessica Marie  2. *No Contact Specified*      Child    [phone]            GUARANTOR            Guarantor: Francie Barragan     : 1940   Address: [address] Sex: Female     [place] [address]     Relation to Patient: Self       Home Phone: [phone]   Guarantor ID: [id number]       Work Phone: [phone]   Guarantor Employer: RETIRED         Status: RETIRED      COVERAGE        PRIMARY INSURANCE   Payor: MEDICARE Plan: MEDICARE PART A AND B   Payor Address: [facility] [address],  [place] [address], [place] [address]       Group Number:   Insurance Type: INDEMNITY   Subscriber Name: Yumi Schmid : 1940   Subscriber ID: [id number] Pat. Rel. to Sub: Self   SECONDARY INSURANCE   Payor:   Plan:     Payor Address:  ,           Group Number:   Insurance Type:     Subscriber Name:   Subscriber :     Subscriber ID:   Pat.  Rel. to Sub:        CSN: [id number]

## 2023-02-05 NOTE — ED NOTES
Patient arrived to the ED to be evaluated after falling at home, they stated they missed the last step while walking down stairs, denies feeling light headed before incident, and denies syncope. Patients left knee and shin visible swollen, patient denies pain at this time.  Patient connected to the monitor, able to make needs known, alert and oriented x4     Sadi Zavala RN  02/05/23 5825

## 2023-02-05 NOTE — CONSULTS
Department of Orthopedic Surgery  Resident Consult Note          Reason for Consult:  Right knee pain    HISTORY OF PRESENT ILLNESS:       Patient is a [de-identified] y.o. female who presents with right knee pain after fall. Patient reports while going down stairs into the basement she missed the last step falling on her right side. Was immediately transported via EMS to the emergency department. Pain localized to the right knee and provoked with moderate range of motion. Denies radicular symptoms. Anticoagulation - none. The patient is community Ambulator without assist  - wheelchair, cane, and walker. Patient reports that she is relatively healthy and extremely active. Pt lives at home. Patient has a significant history of osteopenia. Previous Orthopedic Surgeon -yes; Dr. Nataliia Willis: Right total hip arthroplasty performed 2/2010, left shoulder hemiarthroplasty performed 11/2004. denies numbness/tingling/paresthesias. Denies any other orthopedic complaints at this time. Tobacco use: None  Alcohol use: None  Illicit drug use: None    Past Medical History:        Diagnosis Date    Osteopenia      Past Surgical History:        Procedure Laterality Date    BREAST BIOPSY      JOINT REPLACEMENT Right 02/22/2010    Right total hip arthroplasty. Annette Coreas. Yenny Collins MD    JOINT REPLACEMENT Left 11/05/2004    Hemiarthroplasty of the left shoulder. Annette Coreas. MD Bryce    OTHER SURGICAL HISTORY Right 03/10/2009    Injection arthrogram right hip. Annette Wu MD    THYROIDECTOMY, PARTIAL  1971    right left     Current Medications:   Current Facility-Administered Medications: 0.9 % sodium chloride infusion, , IntraVENous, Continuous  sodium chloride flush 0.9 % injection 5-40 mL, 5-40 mL, IntraVENous, 2 times per day  sodium chloride flush 0.9 % injection 10 mL, 10 mL, IntraVENous, PRN  0.9 % sodium chloride infusion, , IntraVENous, PRN  ondansetron (ZOFRAN-ODT) disintegrating tablet 4 mg, 4 mg, Oral, Q8H PRN **OR** ondansetron (ZOFRAN) injection 4 mg, 4 mg, IntraVENous, Q6H PRN  polyethylene glycol (GLYCOLAX) packet 17 g, 17 g, Oral, Daily PRN  acetaminophen (TYLENOL) tablet 650 mg, 650 mg, Oral, Q6H PRN **OR** acetaminophen (TYLENOL) suppository 650 mg, 650 mg, Rectal, Q6H PRN  oxyCODONE-acetaminophen (PERCOCET) 5-325 MG per tablet 1 tablet, 1 tablet, Oral, Q4H PRN  Allergies:  Tetracyclines & related    Social History:   TOBACCO:   reports that she has never smoked. She has never used smokeless tobacco.  ETOH:   reports no history of alcohol use. DRUGS:   reports no history of drug use.   ACTIVITIES OF DAILY LIVING:    OCCUPATION:    Family History:       Problem Relation Age of Onset    Osteoporosis Mother     Breast Cancer Maternal Aunt     Breast Cancer Maternal Aunt        REVIEW OF SYSTEMS:  CONSTITUTIONAL:  negative for  fevers, chills  EYES:  negative for blurred vision, visual disturbance  HEENT:  negative for  hearing loss, voice change  RESPIRATORY:  negative for  dyspnea, wheezing  CARDIOVASCULAR:  negative for  chest pain, palpitations  GASTROINTESTINAL:  negative for nausea, vomiting  GENITOURINARY:  negative for frequency, urinary incontinence  HEMATOLOGIC/LYMPHATIC:  negative for bleeding and petechiae  MUSCULOSKELETAL:  positive for  pain, joint swelling, and decreased range of motion  NEUROLOGICAL:  negative for headaches, dizziness  BEHAVIOR/PSYCH:  negative for increased agitation and anxiety    PHYSICAL EXAM:    VITALS:  BP (!) 185/73   Pulse 98   Temp 99.1 °F (37.3 °C) (Oral)   Resp 16   Ht 5' 5\" (1.651 m)   Wt 124 lb (56.2 kg)   SpO2 94%   BMI 20.63 kg/m²   CONSTITUTIONAL:  awake, alert, cooperative, no apparent distress, and appears stated age  General appearance: alert, well appearing, and in no distress,  normal appearing weight  Mental status: alert, oriented to person, place, and time, normal mood, behavior, speech, dress, motor activity, and thought processes  Abdomen: soft, nondistended   Resp:   resp easy and unlabored, no audible wheezes note  Cardiac: distal pulses palpable, skin well perfused  Neurological: alert, oriented X3, normal speech, no focal findings or movement disorder noted, motor and sensory grossly normal bilaterally, normal muscle tone, no tremors, strength 5/5, normal gait and station  HEENT: normochephalic atraumatic, external ears and eyes normal, sclera normal, neck supple  Extremities:   peripheral pulses normal, no edema, redness or tenderness in the calves   Skin: normal coloration, no rashes or open wounds, no suspicious skin lesions noted  Psych: Affect euthymic   MUSCULOSKELETAL:  Right lower Extremity:  No obvious deformity noted at the lower extremity. Moderate to significant soft tissue edema around the proximal aspect of the lower leg. There is joint effusion present. Ecchymosis noted at the anterior aspect of the lower leg. Positive wrinkle sign. Compartments soft and compressible, calf non-tender  Mild to moderate tenderness to palpation about the knee. No tenderness to palpation reported at the foot, ankle, femur and hip  She is able to range knee 10-60 prior to discomfort  +DP & PT pulses, Brisk Cap refill, Toes warm and perfused  Sensation grossly intact superficial/deep peroneal,saphenous,sural,tibial n. distributions  +GS/TA/EHL. Secondary Exam:   bilateralUE: No obvious signs of trauma. -TTP to fingers, hand, wrist, forearm, elbow, humerus, shoulder or clavicle. -- Patient able to flex/extend fingers, wrist, elbow and shoulder with active and passive ROM without pain, +2/4 Radial pulse, cap refill <3sec, +AIN/PIN/Radial/Ulnar/Median N, distal sensation grossly intact to C4-T1 dermatomes, compartments soft and compressible. leftLE: No obvious signs of trauma.    -TTP to foot, ankle, leg, knee, thigh, hip.-- Patient able to flex/extend toes, ankle, knee and hip with active and passive ROM without pain,+2/4 DP & PT pulses, cap refill <3sec, +5/5 PF/DF/EHL, distal sensation grossly intact to L4-S1 dermatomes, compartments soft and compressible. Pelvis: -TTP, -Log roll, -Heel strike     DATA:    CBC:   Lab Results   Component Value Date/Time    WBC 4.5 09/16/2022 10:12 AM    RBC 4.58 09/16/2022 10:12 AM    HGB 13.8 09/16/2022 10:12 AM    HCT 41.7 09/16/2022 10:12 AM    MCV 91.0 09/16/2022 10:12 AM    MCH 30.1 09/16/2022 10:12 AM    MCHC 33.1 09/16/2022 10:12 AM    RDW 13.8 09/16/2022 10:12 AM     09/16/2022 10:12 AM    MPV 10.0 09/16/2022 10:12 AM     PT/INR:  No results found for: PROTIME, INR  CRP/ESR:   Lab Results   Component Value Date/Time    CRP 5.4 06/27/2017 12:00 PM    SEDRATE 88 06/27/2017 12:00 PM     Lactic Acid : No results found for: LACTA    Radiology Review:  02/05/23 - XR right knee demonstrates a lateral split with depressed lateral compartment tibial plateau fracture. Seen on AP radiograph, appears to have some involvement of the posterior medial aspect of the plateau. There is a cortical fragment it seems to be displaced proximally. Lateral radiograph displayed potential involvement of the posterior column    CT right knee without contrast pending    IMPRESSION:   Right Schatzker Type 5 tibial plateau Fracture. PLAN:  NWB -R LE  After informed consent was verbally obtained,Closed reduction was then performed with application of well padded long posterior slab splint.   Patient tolerated well and remained neurovascularly intact pre and post reduction  Diet NPO now  Treatment consent  Preoperative antibiotics ordered  Pain Control IV/p.o.  CT right knee without contrast ordered for further assessment and preoperative planning  Continue ice and elevation to decrease swelling  DVT prophylaxis: Hold preoperatively, however can have pneumatic compression device applied to the contralateral limb  Discussed with Dr. aQmar Kaminski Attending    I have seen and evaluated the patient and agree with the above assessment. I have performed the key components of the history and physical examination and concur completely with the findings as documented. CC: Right knee pain    HPI:This is an [de-identified] y.o. female who presents with right knee pain after fall. Patient reports while going down stairs into the basement she missed the last step falling on her right side. Was immediately transported via EMS to the emergency department. Pain localized to the right knee and provoked with moderate range of motion. Denies radicular symptoms. Anticoagulation - none. The patient is community Ambulator without assist  - wheelchair, cane, and walker. Patient reports that she is relatively healthy and extremely active. Pt lives at home. Patient has a significant history of osteopenia. Previous Orthopedic Surgeon -yes; Dr. William Lazaro: Right total hip arthroplasty performed 2/2010, left shoulder hemiarthroplasty performed 11/2004. denies numbness/tingling/paresthesias. Denies any other orthopedic complaints at this time. ROS, medications, allergies, past medical/surgical/social/family histories reviewed and as above    PE:  BP (!) 145/77   Pulse 85   Temp 97.9 °F (36.6 °C) (Temporal)   Resp 18   Ht 5' 5\" (1.651 m)   Wt 124 lb (56.2 kg)   SpO2 96%   BMI 20.63 kg/m²   As above    Radiographic Review:  Right tibial plateau fracture, bicondylar involvement, lateral split depression with posterior medial shear fragment. Advanced underlying tricompartmental OA. ASSESSMENT:  Right bicondylar tibial plateau fracture    PLAN:  Medical admit with surgical optimization  Patient placed into well-padded long-leg splint, maintain nonweightbearing affected extremity  CT scan ordered and pending for further fracture evaluation  Had lengthy discussion with patient regarding their diagnosis, typical prognosis, and expected outcomes. We reviewed the possible complications from the injury itself despite treatment chosen.    We also discussed treatment options including nonoperative managements versus surgical management, along with risks and benefits of each. Patient has elected for surgical management despite associated risks. OR 2/6/2023 for right bicondylar tibial plateau fracture open versus closed reduction with internal versus external fixation pending soft tissue envelope    I have explained the risks and complications of the recommended surgery with the patient at length, as well as discussed potential treatment alternatives including nonoperative management. These risks include but are not limited to death or complication from anesthesia, continued pain, nerve tendon or vascular injury, infection, nonunion or malunion, symptomatic hardware or hardware failure, deep vein thrombosis or pulmonary embolism, stiffness, unforeseen complications, and need for further surgery, etc.  Patient understood this, asked appropriate questions, which were all answered, and she has elected to proceed with the procedure. Electronically signed by   Fol Wong DO  2/6/2023     NOTE: This report was transcribed using voice recognition software.  Every effort was made to ensure accuracy; however, inadvertent computerized transcription errors may be present

## 2023-02-05 NOTE — PROGRESS NOTES
Perfect serve sent to orthopedic resident that patient wants to speak with someone before she signs the consent for surgery.

## 2023-02-06 ENCOUNTER — ANESTHESIA (OUTPATIENT)
Dept: OPERATING ROOM | Age: 83
DRG: 494 | End: 2023-02-06
Payer: MEDICARE

## 2023-02-06 ENCOUNTER — ANESTHESIA EVENT (OUTPATIENT)
Dept: OPERATING ROOM | Age: 83
DRG: 494 | End: 2023-02-06
Payer: MEDICARE

## 2023-02-06 ENCOUNTER — APPOINTMENT (OUTPATIENT)
Dept: GENERAL RADIOLOGY | Age: 83
DRG: 494 | End: 2023-02-06
Payer: MEDICARE

## 2023-02-06 LAB
ANION GAP SERPL CALCULATED.3IONS-SCNC: 9 MMOL/L (ref 7–16)
BASOPHILS ABSOLUTE: 0.02 E9/L (ref 0–0.2)
BASOPHILS RELATIVE PERCENT: 0.4 % (ref 0–2)
BUN BLDV-MCNC: 16 MG/DL (ref 6–23)
CALCIUM SERPL-MCNC: 8.5 MG/DL (ref 8.6–10.2)
CHLORIDE BLD-SCNC: 102 MMOL/L (ref 98–107)
CO2: 25 MMOL/L (ref 22–29)
CREAT SERPL-MCNC: 0.9 MG/DL (ref 0.5–1)
EOSINOPHILS ABSOLUTE: 0.03 E9/L (ref 0.05–0.5)
EOSINOPHILS RELATIVE PERCENT: 0.5 % (ref 0–6)
GFR SERPL CREATININE-BSD FRML MDRD: >60 ML/MIN/1.73
GLUCOSE BLD-MCNC: 128 MG/DL (ref 74–99)
HCT VFR BLD CALC: 35.5 % (ref 34–48)
HEMOGLOBIN: 11.7 G/DL (ref 11.5–15.5)
IMMATURE GRANULOCYTES #: 0.02 E9/L
IMMATURE GRANULOCYTES %: 0.4 % (ref 0–5)
LYMPHOCYTES ABSOLUTE: 1.04 E9/L (ref 1.5–4)
LYMPHOCYTES RELATIVE PERCENT: 18.4 % (ref 20–42)
MCH RBC QN AUTO: 29 PG (ref 26–35)
MCHC RBC AUTO-ENTMCNC: 33 % (ref 32–34.5)
MCV RBC AUTO: 88.1 FL (ref 80–99.9)
MONOCYTES ABSOLUTE: 0.71 E9/L (ref 0.1–0.95)
MONOCYTES RELATIVE PERCENT: 12.6 % (ref 2–12)
NEUTROPHILS ABSOLUTE: 3.82 E9/L (ref 1.8–7.3)
NEUTROPHILS RELATIVE PERCENT: 67.7 % (ref 43–80)
PDW BLD-RTO: 13.3 FL (ref 11.5–15)
PLATELET # BLD: 287 E9/L (ref 130–450)
PMV BLD AUTO: 9.3 FL (ref 7–12)
POTASSIUM REFLEX MAGNESIUM: 3.9 MMOL/L (ref 3.5–5)
RBC # BLD: 4.03 E12/L (ref 3.5–5.5)
SODIUM BLD-SCNC: 136 MMOL/L (ref 132–146)
WBC # BLD: 5.6 E9/L (ref 4.5–11.5)

## 2023-02-06 PROCEDURE — 80048 BASIC METABOLIC PNL TOTAL CA: CPT

## 2023-02-06 PROCEDURE — 85025 COMPLETE CBC W/AUTO DIFF WBC: CPT

## 2023-02-06 PROCEDURE — 2720000010 HC SURG SUPPLY STERILE: Performed by: ORTHOPAEDIC SURGERY

## 2023-02-06 PROCEDURE — 3600000015 HC SURGERY LEVEL 5 ADDTL 15MIN: Performed by: ORTHOPAEDIC SURGERY

## 2023-02-06 PROCEDURE — 3209999900 FLUORO FOR SURGICAL PROCEDURES

## 2023-02-06 PROCEDURE — 1200000000 HC SEMI PRIVATE

## 2023-02-06 PROCEDURE — 36415 COLL VENOUS BLD VENIPUNCTURE: CPT

## 2023-02-06 PROCEDURE — 3600000005 HC SURGERY LEVEL 5 BASE: Performed by: ORTHOPAEDIC SURGERY

## 2023-02-06 PROCEDURE — 0QSG35Z REPOSITION RIGHT TIBIA WITH EXTERNAL FIXATION DEVICE, PERCUTANEOUS APPROACH: ICD-10-PCS | Performed by: ORTHOPAEDIC SURGERY

## 2023-02-06 PROCEDURE — 3E0T3BZ INTRODUCTION OF ANESTHETIC AGENT INTO PERIPHERAL NERVES AND PLEXI, PERCUTANEOUS APPROACH: ICD-10-PCS | Performed by: ANESTHESIOLOGY

## 2023-02-06 PROCEDURE — 73560 X-RAY EXAM OF KNEE 1 OR 2: CPT

## 2023-02-06 PROCEDURE — 0S9C3ZZ DRAINAGE OF RIGHT KNEE JOINT, PERCUTANEOUS APPROACH: ICD-10-PCS | Performed by: ORTHOPAEDIC SURGERY

## 2023-02-06 PROCEDURE — 2580000003 HC RX 258: Performed by: NURSE ANESTHETIST, CERTIFIED REGISTERED

## 2023-02-06 PROCEDURE — 2580000003 HC RX 258

## 2023-02-06 PROCEDURE — 2580000003 HC RX 258: Performed by: NURSE PRACTITIONER

## 2023-02-06 PROCEDURE — C1713 ANCHOR/SCREW BN/BN,TIS/BN: HCPCS | Performed by: ORTHOPAEDIC SURGERY

## 2023-02-06 PROCEDURE — 6370000000 HC RX 637 (ALT 250 FOR IP): Performed by: ORTHOPAEDIC SURGERY

## 2023-02-06 PROCEDURE — 7100000001 HC PACU RECOVERY - ADDTL 15 MIN: Performed by: ORTHOPAEDIC SURGERY

## 2023-02-06 PROCEDURE — 2580000003 HC RX 258: Performed by: ORTHOPAEDIC SURGERY

## 2023-02-06 PROCEDURE — 2709999900 HC NON-CHARGEABLE SUPPLY: Performed by: ORTHOPAEDIC SURGERY

## 2023-02-06 PROCEDURE — 3700000001 HC ADD 15 MINUTES (ANESTHESIA): Performed by: ORTHOPAEDIC SURGERY

## 2023-02-06 PROCEDURE — 64445 NJX AA&/STRD SCIATIC NRV IMG: CPT | Performed by: ANESTHESIOLOGY

## 2023-02-06 PROCEDURE — 7100000000 HC PACU RECOVERY - FIRST 15 MIN: Performed by: ORTHOPAEDIC SURGERY

## 2023-02-06 PROCEDURE — 3700000000 HC ANESTHESIA ATTENDED CARE: Performed by: ORTHOPAEDIC SURGERY

## 2023-02-06 PROCEDURE — 6360000002 HC RX W HCPCS: Performed by: ANESTHESIOLOGY

## 2023-02-06 PROCEDURE — A4216 STERILE WATER/SALINE, 10 ML: HCPCS

## 2023-02-06 PROCEDURE — 6360000002 HC RX W HCPCS: Performed by: ORTHOPAEDIC SURGERY

## 2023-02-06 PROCEDURE — 6360000002 HC RX W HCPCS: Performed by: NURSE ANESTHETIST, CERTIFIED REGISTERED

## 2023-02-06 PROCEDURE — 64447 NJX AA&/STRD FEMORAL NRV IMG: CPT | Performed by: ANESTHESIOLOGY

## 2023-02-06 RX ORDER — MEPERIDINE HYDROCHLORIDE 25 MG/ML
12.5 INJECTION INTRAMUSCULAR; INTRAVENOUS; SUBCUTANEOUS
Status: DISCONTINUED | OUTPATIENT
Start: 2023-02-06 | End: 2023-02-06 | Stop reason: HOSPADM

## 2023-02-06 RX ORDER — ENOXAPARIN SODIUM 100 MG/ML
40 INJECTION SUBCUTANEOUS DAILY
Status: DISCONTINUED | OUTPATIENT
Start: 2023-02-07 | End: 2023-02-08 | Stop reason: HOSPADM

## 2023-02-06 RX ORDER — DEXAMETHASONE SODIUM PHOSPHATE 10 MG/ML
INJECTION INTRAMUSCULAR; INTRAVENOUS PRN
Status: DISCONTINUED | OUTPATIENT
Start: 2023-02-06 | End: 2023-02-06 | Stop reason: SDUPTHER

## 2023-02-06 RX ORDER — ROPIVACAINE HYDROCHLORIDE 5 MG/ML
30 INJECTION, SOLUTION EPIDURAL; INFILTRATION; PERINEURAL ONCE
Status: COMPLETED | OUTPATIENT
Start: 2023-02-06 | End: 2023-02-06

## 2023-02-06 RX ORDER — SODIUM CHLORIDE 9 MG/ML
INJECTION, SOLUTION INTRAVENOUS CONTINUOUS PRN
Status: DISCONTINUED | OUTPATIENT
Start: 2023-02-06 | End: 2023-02-06 | Stop reason: SDUPTHER

## 2023-02-06 RX ORDER — FENTANYL CITRATE 50 UG/ML
INJECTION, SOLUTION INTRAMUSCULAR; INTRAVENOUS PRN
Status: DISCONTINUED | OUTPATIENT
Start: 2023-02-06 | End: 2023-02-06 | Stop reason: SDUPTHER

## 2023-02-06 RX ORDER — SODIUM CHLORIDE 9 MG/ML
INJECTION INTRAVENOUS
Status: COMPLETED
Start: 2023-02-06 | End: 2023-02-06

## 2023-02-06 RX ORDER — CEFAZOLIN SODIUM 1 G/3ML
INJECTION, POWDER, FOR SOLUTION INTRAMUSCULAR; INTRAVENOUS PRN
Status: DISCONTINUED | OUTPATIENT
Start: 2023-02-06 | End: 2023-02-06 | Stop reason: SDUPTHER

## 2023-02-06 RX ORDER — PROPOFOL 10 MG/ML
INJECTION, EMULSION INTRAVENOUS PRN
Status: DISCONTINUED | OUTPATIENT
Start: 2023-02-06 | End: 2023-02-06 | Stop reason: SDUPTHER

## 2023-02-06 RX ORDER — ROPIVACAINE HYDROCHLORIDE 5 MG/ML
INJECTION, SOLUTION EPIDURAL; INFILTRATION; PERINEURAL
Status: COMPLETED | OUTPATIENT
Start: 2023-02-06 | End: 2023-02-06

## 2023-02-06 RX ORDER — MIDAZOLAM HYDROCHLORIDE 2 MG/2ML
1 INJECTION, SOLUTION INTRAMUSCULAR; INTRAVENOUS PRN
Status: DISCONTINUED | OUTPATIENT
Start: 2023-02-06 | End: 2023-02-06 | Stop reason: HOSPADM

## 2023-02-06 RX ORDER — LIDOCAINE HYDROCHLORIDE 20 MG/ML
INJECTION, SOLUTION INTRAVENOUS PRN
Status: DISCONTINUED | OUTPATIENT
Start: 2023-02-06 | End: 2023-02-06 | Stop reason: SDUPTHER

## 2023-02-06 RX ORDER — SODIUM CHLORIDE 0.9 % (FLUSH) 0.9 %
5-40 SYRINGE (ML) INJECTION PRN
Status: DISCONTINUED | OUTPATIENT
Start: 2023-02-06 | End: 2023-02-06 | Stop reason: HOSPADM

## 2023-02-06 RX ORDER — OXYCODONE HYDROCHLORIDE AND ACETAMINOPHEN 5; 325 MG/1; MG/1
1 TABLET ORAL EVERY 6 HOURS PRN
Qty: 28 TABLET | Refills: 0 | Status: SHIPPED | OUTPATIENT
Start: 2023-02-06 | End: 2023-02-09 | Stop reason: SDUPTHER

## 2023-02-06 RX ORDER — DEXAMETHASONE SODIUM PHOSPHATE 10 MG/ML
4 INJECTION, SOLUTION INTRAMUSCULAR; INTRAVENOUS ONCE
Status: COMPLETED | OUTPATIENT
Start: 2023-02-06 | End: 2023-02-06

## 2023-02-06 RX ORDER — SODIUM CHLORIDE 9 MG/ML
INJECTION, SOLUTION INTRAVENOUS PRN
Status: DISCONTINUED | OUTPATIENT
Start: 2023-02-06 | End: 2023-02-06 | Stop reason: HOSPADM

## 2023-02-06 RX ORDER — ONDANSETRON 2 MG/ML
4 INJECTION INTRAMUSCULAR; INTRAVENOUS
Status: DISCONTINUED | OUTPATIENT
Start: 2023-02-06 | End: 2023-02-06 | Stop reason: HOSPADM

## 2023-02-06 RX ORDER — SODIUM CHLORIDE 0.9 % (FLUSH) 0.9 %
5-40 SYRINGE (ML) INJECTION EVERY 12 HOURS SCHEDULED
Status: DISCONTINUED | OUTPATIENT
Start: 2023-02-06 | End: 2023-02-06 | Stop reason: HOSPADM

## 2023-02-06 RX ADMIN — CEFAZOLIN 2000 MG: 2 INJECTION, POWDER, FOR SOLUTION INTRAMUSCULAR; INTRAVENOUS at 18:06

## 2023-02-06 RX ADMIN — DEXAMETHASONE SODIUM PHOSPHATE 4 MG: 10 INJECTION INTRAMUSCULAR; INTRAVENOUS at 09:40

## 2023-02-06 RX ADMIN — SODIUM CHLORIDE: 9 INJECTION, SOLUTION INTRAVENOUS at 11:26

## 2023-02-06 RX ADMIN — FENTANYL CITRATE 50 MCG: 50 INJECTION, SOLUTION INTRAMUSCULAR; INTRAVENOUS at 11:58

## 2023-02-06 RX ADMIN — ROPIVACAINE HYDROCHLORIDE 15 ML: 5 INJECTION, SOLUTION EPIDURAL; INFILTRATION; PERINEURAL at 09:41

## 2023-02-06 RX ADMIN — FENTANYL CITRATE 50 MCG: 50 INJECTION, SOLUTION INTRAMUSCULAR; INTRAVENOUS at 11:33

## 2023-02-06 RX ADMIN — SODIUM CHLORIDE, PRESERVATIVE FREE 10 ML: 5 INJECTION INTRAVENOUS at 08:30

## 2023-02-06 RX ADMIN — CEFAZOLIN 2 G: 1 INJECTION, POWDER, FOR SOLUTION INTRAMUSCULAR; INTRAVENOUS at 11:39

## 2023-02-06 RX ADMIN — ROPIVACAINE HYDROCHLORIDE 30 ML: 5 INJECTION EPIDURAL; INFILTRATION; PERINEURAL at 09:40

## 2023-02-06 RX ADMIN — SODIUM CHLORIDE, PRESERVATIVE FREE 10 ML: 5 INJECTION INTRAVENOUS at 21:16

## 2023-02-06 RX ADMIN — ROPIVACAINE HYDROCHLORIDE 15 ML: 5 INJECTION EPIDURAL; INFILTRATION; PERINEURAL at 09:48

## 2023-02-06 RX ADMIN — DEXAMETHASONE SODIUM PHOSPHATE 10 MG: 10 INJECTION INTRAMUSCULAR; INTRAVENOUS at 11:33

## 2023-02-06 RX ADMIN — SODIUM CHLORIDE 30 ML: 9 INJECTION, SOLUTION INTRAMUSCULAR; INTRAVENOUS; SUBCUTANEOUS at 09:40

## 2023-02-06 RX ADMIN — PROPOFOL 150 MG: 10 INJECTION, EMULSION INTRAVENOUS at 11:33

## 2023-02-06 RX ADMIN — MIDAZOLAM 2 MG: 1 INJECTION INTRAMUSCULAR; INTRAVENOUS at 09:36

## 2023-02-06 RX ADMIN — LIDOCAINE HYDROCHLORIDE 60 MG: 20 INJECTION, SOLUTION INTRAVENOUS at 11:33

## 2023-02-06 RX ADMIN — OXYCODONE AND ACETAMINOPHEN 1 TABLET: 5; 325 TABLET ORAL at 14:57

## 2023-02-06 ASSESSMENT — PAIN SCALES - GENERAL
PAINLEVEL_OUTOF10: 0
PAINLEVEL_OUTOF10: 3
PAINLEVEL_OUTOF10: 5
PAINLEVEL_OUTOF10: 0
PAINLEVEL_OUTOF10: 0

## 2023-02-06 ASSESSMENT — PAIN DESCRIPTION - ORIENTATION: ORIENTATION: RIGHT;LOWER

## 2023-02-06 ASSESSMENT — PAIN DESCRIPTION - FREQUENCY: FREQUENCY: INTERMITTENT

## 2023-02-06 ASSESSMENT — PAIN DESCRIPTION - ONSET: ONSET: GRADUAL

## 2023-02-06 ASSESSMENT — PAIN DESCRIPTION - LOCATION: LOCATION: INCISION;LEG

## 2023-02-06 ASSESSMENT — PAIN DESCRIPTION - DESCRIPTORS: DESCRIPTORS: ACHING;DISCOMFORT;TINGLING

## 2023-02-06 ASSESSMENT — PAIN - FUNCTIONAL ASSESSMENT: PAIN_FUNCTIONAL_ASSESSMENT: PREVENTS OR INTERFERES SOME ACTIVE ACTIVITIES AND ADLS

## 2023-02-06 NOTE — CARE COORDINATION
2/6. Postop ORIF R tibial plateau. Will need PT/OT to assist with discharge planning.  Steve Guevara RN

## 2023-02-06 NOTE — PROGRESS NOTES
OT consult received and appreciated. Chart reviewed. Will hold evaluation due to OR schedule this date for R tibial plateau ORIF . Will evaluate at a later time post op as indicated. Thank you. Fiona Frey.  Uri 72, Sal[address]

## 2023-02-06 NOTE — OP NOTE
Operative Note      Patient: Jean-Pierre Rodrigues  YOB: 1940  MRN: [id number]    Date of Procedure: 2/6/2023    Pre-Op Diagnosis: Right bicondylar tibial plateau fracture  Post-Op Diagnosis: Same       Procedure(s):  Closed reduction right bicondylar tibial plateau fracture required manipulation under general anesthesia  Application right knee spanning external fixation    Surgeon(s):  Sophie Avina DO    Assistant:   Resident: Mireille Akhtar DO    Anesthesia: General    Estimated Blood Loss (mL): less than 50     Complications: None    Specimens:   * No specimens in log *    Implants:  * No implants in log *      Drains: * No LDAs found *    Findings: Right bicondylar tibial plateau fracture, osteopenic bone quality    Detailed Description of Procedure:   Patient brought to the operative suite where she was placed on operative table supine position. Received a general anesthetic by the department esthesia as well as a preoperative regional block. Right lower extremity sterilely prepped and draped out in standard sterile fashion. Surgical timeout is performed per protocol by members of surgical team.  Due to the swelling about the proximal tibia as well as a large knee effusion with loss of skin wrinkling we did not feel formal ORIF was indicated today. We did aspirate the knee to minimize the soft tissue swelling due to the large effusion, 50 cc of hematoma was easily aspirated from the knee. Next we placed 2 Schanz pins in the distal femoral shaft from an anterior lateral to posterior medial trajectory through small stab incisions. 2 anterior posterior tibial Schanz pins were also placed in a similar technique. Fluoroscopy confirmed appropriate pin placement and penetration.   Next we manipulated the fracture with traction through the lower leg and the distal Schanz pins once he felt better overall appropriate reduction AP and lateral views excellent bars and clamps were tightened and assembled. Final images were then taken and saved to [employer] system. Pin sites were dressed with Hibiclens soaked Jay Jay wraps. Patient was then extubated uneventfully transfer onto the [facility] to the postanesthesia care in stable condition. Postoperative plans:  Patient will be readmitted back to medical surgical [facility]. She received 24 hours of postop antibiotics. She be started chemical DVT prophylaxis postoperative day 1. To be strict nonweightbearing the right lower extremity is to maintain strict elevation. We will plan to have her follow-up in the office after discharge in approximate 1 week for soft tissue evaluation. She already has a CT delineating her fracture pattern. Orthopedics will continue to follow along during her stay here. Electronically signed by Juan Bowens DO on 2/6/2023     NOTE: This report was transcribed using voice recognition software.  Every effort was made to ensure accuracy; however, inadvertent computerized transcription errors may be present

## 2023-02-06 NOTE — PROGRESS NOTES
Physical Therapy    Date: 2023       Patient Name: Marissa Tapia  : 1940      MRN: [id number]    PT order received. Chart has been reviewed. PT evaluation will be on hold due to planned OR this date. Will continue to follow and complete evaluation at later time.      Medina Hooker, PT

## 2023-02-06 NOTE — DISCHARGE INSTRUCTIONS
[facility]) Department of Orthopedic Surgery  [address] MD Dr. Fleurette Frieze, MD [place] Gander, PA-C Rojean Meager PA-C Wendell Bleak PA-C      Orthopaedics Discharge Instructions   Weight bearing Status - Non-weight bearing - on right lower Extremity  Pain medication Per Prescriptions  Contact Office for Medication Refill- [phone]  Office can refill pain med every 7 days  If patient discharging to facility then pain control will be continued per facility physician  Ice to operative/injured site for 15-30 minutes of each hour for next 5 days    Recommend that you continue to ice the area 2-3 times per day after this   Elevate operative/injured limb on 2 pillows at home  Goal is to have limb above the heart if able  Continue DVT Prophylaxis (blood clot prevention) as Prescribed: aspirin   Wound care - keep dressing clean dry and intact until seen in the office. Follow Up in Office in 2 weeks. Your first post op appointment is often with one of our PAs. Call the office at [phone] or directions or with any questions. Watch for these significant complications. Call physician if they or any other problems occur:  Fever over 101°, redness, swelling or warmth at the operative site  Unrelieved nausea    Foul smelling or cloudy drainage at the operative site   Unrelieved pain    Blood soaked dressing. (Some oozing may be normal)     Numb, pale, blue, cold or tingling extremity    Future Appointments   Date Time Provider Bonifacio Oliveira   2/14/2023  9:45 AM SEYZ ABDU SARAH RM 1 SEYZ ABDU BC [facility]   2/20/2023 12:00 PM SCHEDULE, SE ORTHO APC [facility]   9/28/2023 10:15 AM Juana Rich MD [facility]         It is the Department of Orthopaedic Trauma's standard of practice that providers will de-escalate(wean) all patients from narcotic(opioid) medications during the post-operative period. We provide multimodal pain control but opioid medications are tapered in all of our patients. If patient requires referral to pain management for prolonged taper off of opioid pain medication we will facilitate this process.

## 2023-02-06 NOTE — ANESTHESIA PROCEDURE NOTES
Peripheral Block    Patient location during procedure: pre-op  Reason for block: post-op pain management and at surgeon's request  Start time: 2/6/2023 9:41 AM  End time: 2/6/2023 9:43 AM  Staffing  Performed: anesthesiologist   Anesthesiologist: Jose Juan Shi DO  Preanesthetic Checklist  Completed: patient identified, IV checked, site marked, risks and benefits discussed, surgical/procedural consents, equipment checked, pre-op evaluation, timeout performed, anesthesia consent given, oxygen available and monitors applied/VS acknowledged  Peripheral Block   Patient position: supine  Prep: ChloraPrep  Provider prep: mask and sterile gloves  Patient monitoring: cardiac monitor, continuous pulse ox, frequent blood pressure checks and IV access  Block type: Sciatic  Popliteal  Laterality: right  Injection technique: single-shot  Guidance: ultrasound guided  Local infiltration: lidocaine  Infiltration strength: 1 %  Local infiltration: lidocaine  Dose: 3 mL    Needle   Needle gauge: 21 G  Needle localization: ultrasound guidance  Needle length: 10 cm  Assessment   Injection assessment: negative aspiration for heme, no paresthesia on injection and local visualized surrounding nerve on ultrasound  Paresthesia pain: none  Slow fractionated injection: yes  Hemodynamics: stable  Real-time US image taken/store: yes  Outcomes: uncomplicated and patient tolerated procedure well    Additional Notes  U/S 20840. Time out performed.          Medications Administered  dexamethasone 4 MG/ML - Perineural   2 mg - 2/6/2023 9:41:00 AM  ropivacaine (NAROPIN) injection 0.5% - Perineural   15 mL - 2/6/2023 9:41:00 AM

## 2023-02-06 NOTE — PROGRESS NOTES
Notified doctor that patient is refusing to sign anything until someone come up to speak to her about what exactly is happening. Awaiting response.

## 2023-02-06 NOTE — ANESTHESIA PRE PROCEDURE
Department of Anesthesiology  Preprocedure Note       Name:  Percy Vargas   Age:  [de-identified] y.o.  :  1940                                          MRN:  [id number]         Date:  2023      Surgeon: Vinay Godwin):  Amalia De Luna DO    Procedure: Procedure(s):  TIBIAL PLATEAU OPEN REDUCTION INTERNAL FIXATION    Medications prior to admission:   Prior to Admission medications    Medication Sig Start Date End Date Taking? Authorizing Provider   Cholecalciferol (VITAMIN D) 2000 UNITS CAPS capsule Take  by mouth. Historical Provider, MD   Ascorbic Acid (VITAMIN C) 500 MG CAPS Take 1,000 mg by mouth. Historical Provider, MD   Multiple Vitamins-Minerals (WOMENS BONE HEALTH) TABS Take  by mouth.       Historical Provider, MD       Current medications:    Current Facility-Administered Medications   Medication Dose Route Frequency Provider Last Rate Last Admin    midazolam PF (VERSED) injection 1 mg  1 mg IntraVENous PRN Benjamin Baca DO   2 mg at 23 0936    0.9 % sodium chloride infusion   IntraVENous Continuous Maybecher Moore APRN - CNP 75 mL/hr at 23 1658 New Bag at 23 1658    sodium chloride flush 0.9 % injection 5-40 mL  5-40 mL IntraVENous 2 times per day Maybepiotre Oscar APRN - CNP   10 mL at 23 0830    sodium chloride flush 0.9 % injection 10 mL  10 mL IntraVENous PRN Maybecher Moore, APRN - CNP        0.9 % sodium chloride infusion   IntraVENous PRN Maybecher Moore APRN - CNP        ondansetron (ZOFRAN-ODT) disintegrating tablet 4 mg  4 mg Oral Q8H PRN Jazzy Moore APRN - ELISHA        Or    ondansetron (ZOFRAN) injection 4 mg  4 mg IntraVENous Q6H PRN Jazzy Moore, APRN - CNP        polyethylene glycol (GLYCOLAX) packet 17 g  17 g Oral Daily PRN Maybecher Moore APRN - CNP        acetaminophen (TYLENOL) tablet 650 mg  650 mg Oral Q6H PRN Jazzy Moore APRN - CNP        Or    acetaminophen (TYLENOL) suppository 650 mg  650 mg Rectal Q6H PRN TISH Galvez CNP        oxyCODONE-acetaminophen (PERCOCET) 5-325 MG per tablet 1 tablet  1 tablet Oral Q4H PRN TISH Galvez CNP   1 tablet at 02/05/23 2148       Allergies: Allergies   Allergen Reactions    Tetracyclines & Related Rash       Problem List:    Patient Active Problem List   Diagnosis Code    Tibial plateau fracture, right, closed, initial encounter S82.141A       Past Medical History:        Diagnosis Date    Osteopenia        Past Surgical History:        Procedure Laterality Date    BREAST BIOPSY      JOINT REPLACEMENT Right 02/22/2010    Right total hip arthroplasty. Jerry Amaya. Kaushal Mondragon MD    JOINT REPLACEMENT Left 11/05/2004    Hemiarthroplasty of the left shoulder. Jerry Wu MD    OTHER SURGICAL HISTORY Right 03/10/2009    Injection arthrogram right hip. Jerry Mondragon MD    THYROIDECTOMY, PARTIAL  1971    right left       Social History:    Social History     Tobacco Use    Smoking status: Never    Smokeless tobacco: Never   Substance Use Topics    Alcohol use: No                                Counseling given: Not Answered      Vital Signs (Current):   Vitals:    02/05/23 1958 02/06/23 0832 02/06/23 0935 02/06/23 0940   BP: (!) 145/77 (!) 160/68 (!) 171/78 (!) 163/89   Pulse: 85 80 82 81   Resp: 18      Temp: 97.9 °F (36.6 °C) 97.1 °F (36.2 °C)     TempSrc: Temporal Temporal     SpO2: 96% 98% 100% 98%   Weight:       Height:                                                  BP Readings from Last 3 Encounters:   02/06/23 (!) 163/89   09/23/22 (!) 142/80   09/16/21 (!) 160/80       NPO Status:  > 8hrs                                                                               BMI:   Wt Readings from Last 3 Encounters:   02/05/23 124 lb (56.2 kg)   09/23/22 122 lb 1.6 oz (55.4 kg)   09/16/21 142 lb 8 oz (64.6 kg)     Body mass index is 20.63 kg/m².     CBC:   Lab Results   Component Value Date/Time    WBC 5.6 02/06/2023 05:27 AM    RBC 4.03 02/06/2023 05:27 AM    HGB 11.7 02/06/2023 05:27 AM    HCT 35.5 02/06/2023 05:27 AM    MCV 88.1 02/06/2023 05:27 AM    RDW 13.3 02/06/2023 05:27 AM     02/06/2023 05:27 AM       CMP:   Lab Results   Component Value Date/Time     02/06/2023 05:27 AM    K 3.9 02/06/2023 05:27 AM     02/06/2023 05:27 AM    CO2 25 02/06/2023 05:27 AM    BUN 16 02/06/2023 05:27 AM    CREATININE 0.9 02/06/2023 05:27 AM    GFRAA >60 09/16/2022 10:12 AM    LABGLOM >60 02/06/2023 05:27 AM    GLUCOSE 128 02/06/2023 05:27 AM    PROT 7.1 09/16/2022 10:12 AM    CALCIUM 8.5 02/06/2023 05:27 AM    BILITOT 0.5 09/16/2022 10:12 AM    ALKPHOS 89 09/16/2022 10:12 AM    AST 15 09/16/2022 10:12 AM    ALT 6 09/16/2022 10:12 AM       POC Tests: No results for input(s): POCGLU, POCNA, POCK, POCCL, POCBUN, POCHEMO, POCHCT in the last 72 hours.     Coags:   Lab Results   Component Value Date/Time    PROTIME 10.4 02/05/2023 04:46 PM    INR 1.0 02/05/2023 04:46 PM    APTT 30.8 02/05/2023 04:46 PM       HCG (If Applicable): No results found for: PREGTESTUR, PREGSERUM, HCG, HCGQUANT     ABGs: No results found for: PHART, PO2ART, XDX1TXI, ACQ8TWZ, BEART, J3DYMLDD     Type & Screen (If Applicable):  No results found for: LABABO, LABRH    Drug/Infectious Status (If Applicable):  No results found for: HIV, HEPCAB    COVID-19 Screening (If Applicable):   Lab Results   Component Value Date/Time    COVID19 Not Detected 12/02/2020 09:19 AM           Anesthesia Evaluation  Patient summary reviewed and Nursing notes reviewed  Airway: Mallampati: III  TM distance: >3 FB   Neck ROM: full  Mouth opening: > = 3 FB   Dental: normal exam         Pulmonary:normal exam  breath sounds clear to auscultation                             Cardiovascular:  Exercise tolerance: good (>4 METS),           Rhythm: regular  Rate: normal                    Neuro/Psych:               GI/Hepatic/Renal:             Endo/Other:                      ROS comment: Tibial plateua fx Abdominal:             Vascular: Other Findings:           Anesthesia Plan      general and regional     ASA 1       Induction: intravenous. MIPS: Postoperative opioids intended, Prophylactic antiemetics administered and Postoperative trial extubation. Anesthetic plan and risks discussed with patient. Plan discussed with CRNA.                     Jas Bullock DO   2/6/2023

## 2023-02-06 NOTE — BRIEF OP NOTE
Brief Postoperative Note      Patient: Donna Garcia  YOB: 1940  MRN: [id number]    Date of Procedure: 2/6/2023    Pre-Op Diagnosis: RIGHT CLOSED TIBIAL PLATEAU FRACTURE    Post-Op Diagnosis: Same       Procedure(s):  TIBIAL PLATEAU OPEN REDUCTION INTERNAL FIXATION    Surgeon(s):  Jan Escalera DO    Assistant:  Resident: Jaqueline Hill DO    Anesthesia: General    Estimated Blood Loss (mL): Minimal    Complications: None    Specimens:   * No specimens in log *    Implants:  * No implants in log *      Drains: * No LDAs found *    Findings: see op note    Electronically signed by Lisa Washburn DO on 2/6/2023 at 12:09 PM

## 2023-02-07 LAB
ANION GAP SERPL CALCULATED.3IONS-SCNC: 13 MMOL/L (ref 7–16)
BASOPHILS ABSOLUTE: 0 E9/L (ref 0–0.2)
BASOPHILS RELATIVE PERCENT: 0 % (ref 0–2)
BUN BLDV-MCNC: 16 MG/DL (ref 6–23)
CALCIUM SERPL-MCNC: 8.6 MG/DL (ref 8.6–10.2)
CHLORIDE BLD-SCNC: 105 MMOL/L (ref 98–107)
CO2: 20 MMOL/L (ref 22–29)
CREAT SERPL-MCNC: 0.8 MG/DL (ref 0.5–1)
EOSINOPHILS ABSOLUTE: 0 E9/L (ref 0.05–0.5)
EOSINOPHILS RELATIVE PERCENT: 0 % (ref 0–6)
GFR SERPL CREATININE-BSD FRML MDRD: >60 ML/MIN/1.73
GLUCOSE BLD-MCNC: 172 MG/DL (ref 74–99)
HBA1C MFR BLD: 5.7 % (ref 4–5.6)
HCT VFR BLD CALC: 33.8 % (ref 34–48)
HEMOGLOBIN: 11.1 G/DL (ref 11.5–15.5)
IMMATURE GRANULOCYTES #: 0.06 E9/L
IMMATURE GRANULOCYTES %: 0.6 % (ref 0–5)
LYMPHOCYTES ABSOLUTE: 0.68 E9/L (ref 1.5–4)
LYMPHOCYTES RELATIVE PERCENT: 7.2 % (ref 20–42)
MCH RBC QN AUTO: 30.1 PG (ref 26–35)
MCHC RBC AUTO-ENTMCNC: 32.8 % (ref 32–34.5)
MCV RBC AUTO: 91.6 FL (ref 80–99.9)
MONOCYTES ABSOLUTE: 1 E9/L (ref 0.1–0.95)
MONOCYTES RELATIVE PERCENT: 10.6 % (ref 2–12)
NEUTROPHILS ABSOLUTE: 7.73 E9/L (ref 1.8–7.3)
NEUTROPHILS RELATIVE PERCENT: 81.6 % (ref 43–80)
PDW BLD-RTO: 13.2 FL (ref 11.5–15)
PLATELET # BLD: 276 E9/L (ref 130–450)
PMV BLD AUTO: 9.9 FL (ref 7–12)
POTASSIUM REFLEX MAGNESIUM: 4.2 MMOL/L (ref 3.5–5)
RBC # BLD: 3.69 E12/L (ref 3.5–5.5)
SODIUM BLD-SCNC: 138 MMOL/L (ref 132–146)
WBC # BLD: 9.5 E9/L (ref 4.5–11.5)

## 2023-02-07 PROCEDURE — 80048 BASIC METABOLIC PNL TOTAL CA: CPT

## 2023-02-07 PROCEDURE — 6360000002 HC RX W HCPCS: Performed by: INTERNAL MEDICINE

## 2023-02-07 PROCEDURE — 1200000000 HC SEMI PRIVATE

## 2023-02-07 PROCEDURE — 6370000000 HC RX 637 (ALT 250 FOR IP): Performed by: ORTHOPAEDIC SURGERY

## 2023-02-07 PROCEDURE — 97530 THERAPEUTIC ACTIVITIES: CPT

## 2023-02-07 PROCEDURE — 83036 HEMOGLOBIN GLYCOSYLATED A1C: CPT

## 2023-02-07 PROCEDURE — 2580000003 HC RX 258: Performed by: ORTHOPAEDIC SURGERY

## 2023-02-07 PROCEDURE — 6360000002 HC RX W HCPCS: Performed by: ORTHOPAEDIC SURGERY

## 2023-02-07 PROCEDURE — 85025 COMPLETE CBC W/AUTO DIFF WBC: CPT

## 2023-02-07 PROCEDURE — 97535 SELF CARE MNGMENT TRAINING: CPT

## 2023-02-07 PROCEDURE — 97165 OT EVAL LOW COMPLEX 30 MIN: CPT

## 2023-02-07 PROCEDURE — 36415 COLL VENOUS BLD VENIPUNCTURE: CPT

## 2023-02-07 PROCEDURE — 97161 PT EVAL LOW COMPLEX 20 MIN: CPT

## 2023-02-07 RX ADMIN — SODIUM CHLORIDE, PRESERVATIVE FREE 10 ML: 5 INJECTION INTRAVENOUS at 20:55

## 2023-02-07 RX ADMIN — OXYCODONE AND ACETAMINOPHEN 1 TABLET: 5; 325 TABLET ORAL at 16:09

## 2023-02-07 RX ADMIN — ENOXAPARIN SODIUM 40 MG: 100 INJECTION SUBCUTANEOUS at 08:35

## 2023-02-07 RX ADMIN — OXYCODONE AND ACETAMINOPHEN 1 TABLET: 5; 325 TABLET ORAL at 20:54

## 2023-02-07 RX ADMIN — CEFAZOLIN 2000 MG: 2 INJECTION, POWDER, FOR SOLUTION INTRAMUSCULAR; INTRAVENOUS at 03:23

## 2023-02-07 RX ADMIN — SODIUM CHLORIDE: 9 INJECTION, SOLUTION INTRAVENOUS at 06:49

## 2023-02-07 ASSESSMENT — PAIN DESCRIPTION - ORIENTATION
ORIENTATION: RIGHT
ORIENTATION: RIGHT

## 2023-02-07 ASSESSMENT — PAIN DESCRIPTION - PAIN TYPE
TYPE: SURGICAL PAIN
TYPE: ACUTE PAIN

## 2023-02-07 ASSESSMENT — PAIN DESCRIPTION - FREQUENCY
FREQUENCY: INTERMITTENT
FREQUENCY: INTERMITTENT

## 2023-02-07 ASSESSMENT — PAIN DESCRIPTION - ONSET
ONSET: PROGRESSIVE
ONSET: GRADUAL

## 2023-02-07 ASSESSMENT — PAIN DESCRIPTION - DESCRIPTORS
DESCRIPTORS: ACHING;DISCOMFORT
DESCRIPTORS: ACHING;DISCOMFORT;SORE

## 2023-02-07 ASSESSMENT — PAIN SCALES - GENERAL
PAINLEVEL_OUTOF10: 6
PAINLEVEL_OUTOF10: 1
PAINLEVEL_OUTOF10: 0
PAINLEVEL_OUTOF10: 7

## 2023-02-07 ASSESSMENT — PAIN DESCRIPTION - LOCATION
LOCATION: LEG
LOCATION: LEG

## 2023-02-07 ASSESSMENT — PAIN - FUNCTIONAL ASSESSMENT
PAIN_FUNCTIONAL_ASSESSMENT: PREVENTS OR INTERFERES SOME ACTIVE ACTIVITIES AND ADLS
PAIN_FUNCTIONAL_ASSESSMENT: PREVENTS OR INTERFERES SOME ACTIVE ACTIVITIES AND ADLS

## 2023-02-07 NOTE — DISCHARGE INSTR - COC
Continuity of Care Form    Patient Name: Elda Campos   :  1940  MRN:  [id number]    Admit date:  2023  Discharge date:  2023    Code Status Order: Full Code   Advance Directives:   [facility] Documentation       Date/Time Healthcare Directive Type of Healthcare Directive Copy in [facility] [address] Agent's Name Healthcare Agent's Phone Number    23 0830 No, patient does not have an advance directive for healthcare treatment -- -- -- -- --            Admitting Physician:  Ponce Lewis MD  PCP: Nadia Daly MD    Discharging Nurse: [address] [place] [address] Unit/Room#: 9481/6252-W  Discharging Unit Phone Number: [phone]    Emergency Contact:   Extended Emergency Contact Information  Primary Emergency Contact: Chau Christianson of [address] [place] [address] Phone: [phone]  Work Phone: [phone]  Mobile Phone: [phone]  Relation: Child    Past Surgical History:  Past Surgical History:   Procedure Laterality Date    BREAST BIOPSY      JOINT REPLACEMENT Right 2010    Right total hip arthroplasty. Robert Pal. Abdullahi Chin MD    JOINT REPLACEMENT Left 2004    Hemiarthroplasty of the left shoulder. Robert Wu MD    OTHER SURGICAL HISTORY Right 03/10/2009    Injection arthrogram right hip. Robert Wu MD    THYROIDECTOMY, PARTIAL  1971    right left       Immunization History: There is no immunization history on file for this patient.     Active Problems:  Patient Active Problem List   Diagnosis Code    Tibial plateau fracture, right, closed, initial encounter S82.141A       Isolation/Infection:   Isolation            No Isolation          Patient Infection Status       Infection Onset Added Last Indicated Last Indicated By Review Planned Expiration Resolved Resolved By    None active    Resolved    COVID-19 (Rule Out) 20 COVID-19 Ambulatory (Ordered)   20 Rule-Out Test Resulted            Nurse Assessment:  Last Vital Signs: BP (!) 155/66   Pulse 69   Temp 96.8 °F (36 °C) (Temporal)   Resp 16   Ht 5' 5\" (1.651 m)   Wt 124 lb (56.2 kg)   SpO2 98%   BMI 20.63 kg/m²     Last documented pain score (0-10 scale): Pain Level: 3  Last Weight:   Wt Readings from Last 1 Encounters:   02/05/23 124 lb (56.2 kg)     Mental Status:  oriented and alert    IV Access:  - None    Nursing Mobility/ADLs:  Walking   Assisted  Transfer  Assisted  Bathing  Assisted  Dressing  Assisted  Toileting  Assisted  Feeding  Independent  Med Admin  Dependent  Med Delivery   whole    Wound Care Documentation and Therapy:  Fixation Device 02/06/23 Right Lower Extremity (Active)   Site Assessment Clean;Dry; Intact 02/06/23 2030   Dressing Status Clean;Dry; Intact 02/06/23 2030   Number of days: 0       Incision 02/06/23 Pretibial Proximal;Right (Active)   Dressing Status Clean;Dry; Intact 02/06/23 2030   Dressing/Treatment Ace wrap 02/06/23 2030   Drainage Amount None 02/06/23 2030   Number of days: 0        Elimination:  Continence: Bowel: Yes  Bladder: Yes  Urinary Catheter: None   Colostomy/Ileostomy/Ileal Conduit: Yes       Date of Last BM: 02/05/2023    Intake/Output Summary (Last 24 hours) at 2/7/2023 1002  Last data filed at 2/7/2023 0646  Gross per 24 hour   Intake 3000.24 ml   Output 2 ml   Net 2998.24 ml     I/O last 3 completed shifts: In: 3000.2 [P.O.:120; I.V.:2880.2]  Out: 2 [Blood:2]    Safety Concerns:     History of Falls (last 30 days)    Impairments/Disabilities:      None    Nutrition Therapy:  Current Nutrition Therapy:   - Oral Diet:  General    Routes of Feeding: Oral  Liquids: Thin Liquids  Daily Fluid Restriction: no  Last Modified Barium Swallow with Video (Video Swallowing Test): not done    Treatments at the Time of Hospital Discharge:   Respiratory Treatments: ***  Oxygen Therapy:  is not on home oxygen therapy.   Ventilator:    - No ventilator support    Rehab Therapies: Physical Therapy, Occupational Therapy, and Orthotics/Prosthetics  Weight Bearing Status/Restrictions: Non-weight bearing on right leg  Other Medical Equipment (for information only, NOT a DME order):  walker and bedside commode  Other Treatments: ***    Patient's personal belongings (please select all that are sent with patient):  None    RN SIGNATURE:  Electronically signed by Marques Jaime RN on 2/8/23 at 11:34 AM EST    CASE MANAGEMENT/SOCIAL WORK SECTION    Inpatient Status Date: ***    Readmission Risk Assessment Score:  Readmission Risk              Risk of Unplanned Readmission:  8           Discharging to Facility/ Agency   Name: Marci Chapman   Address:  Phone:  Fax:    Dialysis Facility (if applicable)   Name:  Address:  Dialysis Schedule:  Phone:  Fax:    / signature: Electronically signed by Tj Carlton RN on 2/7/23 at 4:41 PM EST    PHYSICIAN SECTION    Prognosis: {Prognosis:0231769608}    Condition at Discharge: Yo Basilio Patient Condition:[id number]}    Rehab Potential (if transferring to Rehab): {Prognosis:4425562795}    Recommended Labs or Other Treatments After Discharge: ***    Physician Certification: I certify the above information and transfer of Mindy Santiago  is necessary for the continuing treatment of the diagnosis listed and that she requires [place] for less 30 days.      Update Admission H&P: {CHP DME Changes in LSKGX:376057555}    PHYSICIAN SIGNATURE:  Electronically signed by Karley Cueva DO on 2/7/23 at 10:02 AM EST

## 2023-02-07 NOTE — PROGRESS NOTES
Department of Orthopedic Surgery  Resident Progress Note    Patient seen and examined. Pain controlled. No new complaints. Denies chest pain, shortness of breath, dizziness/lightheadedness. Doing well this morning. States she has no pain but does note she does still have some numbness in her toes and difficulty moving her foot from her block    VITALS:  /65   Pulse 68   Temp 97.8 °F (36.6 °C) (Temporal)   Resp 16   Ht 5' 5\" (1.651 m)   Wt 124 lb (56.2 kg)   SpO2 94%   BMI 20.63 kg/m²     General: awake, alert cooperative in not acute distress. MUSCULOSKELETAL:   right lower extremity:  Dressing C/D/I, ex fix in tact with no signs of loosening  Compartments soft and compressible  Patient is able to plantar flex she has some difficulty with DF and EHL  +2/4 DP & PT pulses, Brisk Cap refill, Toes warm and perfused  Distal sensation grossly intact but decreased to Peroneals, Sural, Saphenous, and tibial nrs    CBC:   Lab Results   Component Value Date/Time    WBC 5.6 02/06/2023 05:27 AM    HGB 11.7 02/06/2023 05:27 AM    HCT 35.5 02/06/2023 05:27 AM     02/06/2023 05:27 AM     PT/INR:    Lab Results   Component Value Date/Time    PROTIME 10.4 02/05/2023 04:46 PM    INR 1.0 02/05/2023 04:46 PM           ASSESSMENT  S/P Right bicondylar tibial plateau ex fix - 2/6    PLAN      Continue physical therapy and protocol: NWB - RLE  24 hour abx coverage  Deep venous thrombosis prophylaxis - Lovenox, early mobilization  PT/OT  Pain Control: Multimodal, the effect of the block is still wearing off at this time. Monitor H&H  D/C Plan:  per PT/OT/SW recs appreciate input  Discuss with attending    Orthopaedic Attending    I have seen and evaluated the patient with the resident and agree with the above assessments on today's visit. I have performed the key components of the history and physical examination and concur completely with the findings and plans as documented above.     SW/CM - d/c planning - rehab  DVT prophylaxis  Nonweightbearing affected extremity  Follow-up in office in 1 week for soft tissue evaluation    Electronically signed by   Franck Rider DO  2/7/2023

## 2023-02-07 NOTE — PROGRESS NOTES
Hospitalist Progress Note      PCP: Griselda Chacko MD    Date of Admission: 2/5/2023        Hospital Course:  [de-identified] y.o. female presented with FALL, SHE WAS GOING DOWN THE  BASEMENT STEPS AND MISSED  THE LAST STEP AND FELL ON HER KNEE, SUSTAINING A TIBIAL PLATEAU FRACTURE** ight knee spanning external fixation        Subjective:     W[facility] TO [facility]         Medications:  Reviewed    Infusion Medications    sodium chloride 75 mL/hr at 02/07/23 1113    sodium chloride       Scheduled Medications    ceFAZolin  2,000 mg IntraVENous See Admin Instructions    enoxaparin  40 mg SubCUTAneous Daily    sodium chloride flush  5-40 mL IntraVENous 2 times per day     PRN Meds: sodium chloride flush, sodium chloride, ondansetron **OR** ondansetron, polyethylene glycol, acetaminophen **OR** acetaminophen, oxyCODONE-acetaminophen      Intake/Output Summary (Last 24 hours) at 2/7/2023 1521  Last data filed at 2/7/2023 1300  Gross per 24 hour   Intake 2670.77 ml   Output --   Net 2670.77 ml       Exam:    BP (!) 155/66   Pulse 69   Temp 96.8 °F (36 °C) (Temporal)   Resp 17   Ht 5' 5\" (1.651 m)   Wt 124 lb (56.2 kg)   SpO2 98%   BMI 20.63 kg/m²       General appearance:  No apparent distress, appears stated age. HEENT:  Normal cephalic, atraumatic without obvious deformity. Pupils equal, round, and reactive to light. Extra ocular muscles intact. Conjunctivae/corneas clear. Neck: Supple, with full range of motion. No jugular venous distention. Trachea midline. Respiratory:  Normal respiratory effort. Clear to auscultation,   Cardiovascular:  RRR  Abdomen: Soft, non-tender, non-distended with normal bowel sounds. Musculoskeletal:  No clubbing, cyanosis .     EDEMA OF RLE** EXTERNAL FIXATOR  Skin: smooth and dry   Neurologic:   Cranial nerves: II-XII intact,   Psychiatric:  Alert and oriented x 3              Labs:   Recent Labs     02/06/23  0527 02/07/23  0409   WBC 5.6 9.5   HGB 11.7 11.1*   HCT 35.5 33.8*   PLT 287 276     Recent Labs     02/06/23  0527 02/07/23  0409    138   K 3.9 4.2    105   CO2 25 20*   BUN 16 16   CREATININE 0.9 0.8   CALCIUM 8.5* 8.6     No results for input(s): AST, ALT, BILIDIR, BILITOT, ALKPHOS in the last 72 hours. Recent Labs     02/05/23  1646   INR 1.0     No results for input(s): Bernis David in the last 72 hours. No results for input(s): AST, ALT, ALB, BILIDIR, BILITOT, ALKPHOS in the last 72 hours. No results for input(s): LACTA in the last 72 hours. Lab Results   Component Value Date    LABURIC 4.2 05/27/2016     No results found for: AMMONIA    Assessment:    Active Hospital Problems    Diagnosis Date Noted    Tibial plateau fracture, right, closed, initial encounter [S82.141A] 02/05/2023     Priority: Medium   Closed reduction right bicondylar tibial plateau fracture required manipulation under general anesthesia  Application right knee spanning external fixation   HYPERTENSION  FALL   HYPERGLYCEMIA  PLAN:  ORTHO   HGAIC  DVT Prophylaxis: LOVENOX IF OK WITH ORTHO  Diet: ADULT DIET;  Regular  Code Status: Full Code     PT/OT Eval Status: ORDERED     Dispo - BAR VS HOME        Electronically signed by Mike Zurita DO on 2/7/2023 at 3:21 PM [place]

## 2023-02-07 NOTE — PROGRESS NOTES
[address]      Date:2023                                                Patient Name: Francie Barragan  MRN: [id number]  : 1940  Room: [address] [place] [address]     Evaluating OT:Oneyda Plaza OTR/L   License #  [id number]       Referring Provider: Hayder Khan DO    Specific Provider Orders/Date: OT evaluation & treatment        Diagnosis:   Right bicondylar tibial plateau fracture    Pertinent Medical History:  has a past medical history of Osteopenia. Surgery: 23: Closed reduction right bicondylar tibial plateau fracture required manipulation under general anesthesia  Application right knee spanning external fixation    Past Surgical History:  has a past surgical history that includes joint replacement (Right, 2010); joint replacement (Left, 2004); Thyroidectomy, partial (); other surgical history (Right, 03/10/2009); and Breast biopsy. Precautions:  Fall Risk, NWB R LE, R ext. Fix. Assessment of current deficits     Functional mobility            [x]ADLs           [x] Strength                  [x]Cognition    [x] Functional transfers          [x] IADLs         [x] Safety Awareness   [x]Endurance    [x] Fine Coordination                         [x] Balance      [] Vision/perception   [x]Sensation      []Gross Motor Coordination             [] ROM           [] Delirium                   [] Motor Control      OT PLAN OF CARE   OT POC based on physician orders, patient diagnosis and results of clinical assessment     Frequency/Duration: 2-4 days/wk for 2 weeks PRN   Specific OT Treatment Interventions to include:    Instruction/training on adapted ADL techniques and AE recommendations to increase functional independence within precautions  Training on energy conservation strategies, correct breathing pattern and techniques to improve independence/tolerance for self-care routine  Functional transfer/mobility training/DME recommendations for increased independence, safety, and fall prevention  Patient/Family education to increase follow through with safety techniques and functional independence  Recommendation of environmental modifications for increased safety with functional transfers/mobility and ADLs  Therapeutic exercise to improve motor endurance, ROM, and functional strength for ADLs/functional transfers  Therapeutic activities to facilitate/challenge dynamic balance, stand tolerance for increased safety and independence with ADLs  Therapeutic activities to facilitate gross/fine motor skills for increased independence with ADLs  Positioning to improve skin integrity, interaction with environment and functional independence     Recommended Adaptive Equipment:  TBD      Home Living: Pt lives alone in a 2 story with 3 steps to enter with no HR. B&B on main level. Bathroom setup: high toilet, walk in shower  Equipment owned: shower bench, ww     Prior Level of Function: Ind. with ADLs , Ind. with IADLs; ambulated no A.D. Driving: active  Occupation:      Pain Level: no pain  Cognition: A&O: 4/4; Follows multi- step directions              Memory:  G              Sequencing:  G              Problem solving:  G              Judgement/safety:  G                Functional Assessment:  AM-PAC Daily Activity Raw Score: 15/24    Initial Eval Status  Date: 2-7-23 Treatment Status  Date: STGs = LTGs  Time frame: 10-14 days   Feeding Ind. Mod I/ Ind   Grooming Set up seated   Modified [place]    UB Dressing SBA to caitlin robe   Modified [place]    LB Dressing Dep B socks   Modified [place]    Bathing Max A sim.  task   Modified [place]    Toileting Max A for hygiene   Modified [place]    Bed Mobility  Supine to sit: Min A  Sit to supine: NT    Supine to sit: Modified [place]   Sit to supine: Modified [place] Functional Transfers Min A with sit <> stand with ww. Mod A with SPT using ww   Modified [place]    Functional Mobility Mod A with ww few steps while maintaining NWB R LE   Modified [place]    Balance Sitting:     Static:  Sup    Dynamic:SBA  Standing: Min/Mod A       Activity Tolerance F   G   Visual/  Perceptual Glasses: no          Vitals WFL   WFL      Hand Dominance R    AROM (PROM) Strength Additional Info:    RUE  WFL 4/5 good  and wfl FMC/dexterity noted during ADL tasks      LUE WFL 4/5 good  and wfl FMC/dexterity noted during ADL tasks         [facility]   Sensation:  No c/o numbness or tingling B UE  Tone: WFL B UE  Edema: none noted B UE     Comments: Upon arrival patient supine in bed, agreeable to OT, cleared by Nursing. Therapist facilitated bed mobility/ADLs/functional mobility/transfers with focus on safety, technique & precautions. Pt. Instructed RE: safe transfers/mobility, ADLs, role of OT, treatment plan, recs. , prec. At end of session, patient seated in bedside chair, all needs met, RN notified, with call light and phone within reach, all lines and tubes intact. Overall patient demonstrated decreased strength, balance, independence & safety during completion of ADL/functional transfer/mobility tasks. Pt would benefit from continued skilled OT to increase safety and independence with completion of ADL/IADL tasks for functional independence and quality of life.      Treatment: OT treatment provided this date includes:   Instruction/training on safety and adapted techniques for completion of ADLs: to increase [place] in self care   Instruction/training on safe functional mobility/transfer techniques: with focus on safety, technique & precautions   Instruction/training on energy conservation/work simplification for completion of ADLs: techniques to increase [place] with self care ADLs & iADLs, work simplification to improve endurance   Proper Positioning/Alignment: for optimal healing, skin integrity to prevent breakdown, decrease edema  Skilled monitoring of vitals: to include BP, spO2 & HR during session  Sitting/standing Balance/Tolerance- to increased balance & activity tolerance during ADLs as well as facilitate proper posture and/or positioning. Rehab Potential: Good for established goals     Patient / Family Goal: to get stronger in Rehab       Patient and/or family were instructed on functional diagnosis, prognosis/goals and OT plan of care. Demonstrated G understanding. Eval Complexity: Low     Time In: 8:40  Time Out: 9:05  Total Treatment Time: 10    Min Units   OT Eval Low 34299  x     OT Eval Medium 69665       OT Eval High 77487       OT Re-Eval [id number]       Therapeutic Ex 88065       Therapeutic Activities 01612       ADL/Self Care 24032  10  1   Orthotic Management 61916       Manual 47775       Neuro Re-Ed 93672       Non-Billable Time          Evaluation Time additionally includes thorough review of current medical information, gathering information on past medical history/social history and prior level of function, interpretation of standardized testing/informal observation of tasks, assessment of data and development of plan of care and goals. Oneyda Plaza, OTR/L   License #  [id number]

## 2023-02-07 NOTE — PROGRESS NOTES
Physical Therapy  Physical Therapy Initial Evaluation    Name: Dimitry Tse  : 1940  MRN: [id number]      Date of Service: 2023    Evaluating PT:  Kathryn Goldstein, PT [id number]    Referring provider/PT Order:  PT Eval and Treat   23 1545  PT evaluation and treat         Sixto Lima DO     Room #:  2979/6578-L  Diagnosis:  Closed fracture of right tibia and fibula, initial encounter [S82.201A, S82.401A]  Tibial plateau fracture, right, closed, initial encounter [S82.141A]  PMHx/PSHx:     has a past medical history of Osteopenia. has a past surgical history that includes joint replacement (Right, 2010); joint replacement (Left, 2004); Thyroidectomy, partial (); other surgical history (Right, 03/10/2009); and Breast biopsy. Procedure/Surgery:  TIBIAL PLATEAU OPEN REDUCTION INTERNAL FIXATION (Right) 23  Precautions:  Falls, NWB (non-weight bearing) RLE,   Equipment Needs: To be determined,    SUBJECTIVE:    Patient lives alone  in a two story home resides first  with 3 steps to enter without Rail  Bed is on 1 floor and bath is on 1 floor. Patient ambulated independently  PTA. Equipment owned: Nuvia Berry      OBJECTIVE:   Initial Evaluation  Date: 23 Treatment Short Term/ Long Term   Goals   AM-PAC 6 Clicks 42/97     Was pt agreeable to Eval/treatment? yes     Does pt have pain? Minimal pain noted     Bed Mobility  Rolling: SBA  Supine to sit:   Min   Sit to supine: NT   Scooting: Min   Rolling: Ind  Supine to sit: Ind  Sit to supine: Ind  Scooting: Ind   Transfers Sit to stand: Min to fww  Stand to sit: Min  Stand pivot: Mod  with fww  Sit to stand: Mod Ind  to fww  Stand to sit: Mod Ind    Stand pivot: Mod Ind  with fww   Ambulation    3 feet with fww Mod cues for NWB RLE.    20 feet with Mod Ind  fww   Stair negotiation: ascended and descended  NT  TBD     Strength/ROM:     RLE grossly 3+/5 hip  LLE grossly 4+/5  RLE AROM R knee ex fix  LLE AROM WFL    Balance:     Static Sitting: Ind  Dynamic Sitting: Ind  Static Standing: Min with fww  Dynamic Standing: Mod  with fww      Patient is Alert & Oriented x person, place, time, and situation and follows directions   Sensation:  Pt denies numbness and tingling to extremities  Edema:  none able to wiggle toes RLE. Therapeutic Exercises:  Functional activity as noted above. Patient education  Pt educated on role of Physical Therapy, risks of immobility, safety and plan of care,  importance of mobility while in hospital , safety , and weight bearing status  and use of call light for safety. Patient response to education:   Pt verbalized understanding Pt demonstrated skill Pt requires further education in this area   yes yes Reminders     ASSESSMENT:    Conditions Requiring Skilled Therapeutic Intervention:    [x]Decreased strength     [x]Decreased ROM  [x]Decreased functional mobility  [x]Decreased balance   [x]Decreased endurance   [x]Decreased posture  []Decreased sensation  []Decreased coordination   []Decreased vision  [x]Decreased safety awareness   []Increased pain       Comments:    RN cleared patient for participation in therapy session. Patient was seen this date for PT evaluation. . Patient was agreeable to intervention. Results of the functional assessment are noted above. Upon entering the room patient was found supine in bed. Transitioned to EOB. Fww required for transfers and gait. At end of session, patient in chair with RLE elevated call light and phone within reach,  all lines and tubes intact, nursing notified. This patient can benefit from the continuation of skilled PT possibly in a rehab setting to maximize functional level and return to PLOF.    Treatment:  Patient completed and was instructed in the following treatment:      Bed mobility training - pt given verbal and tactile cues to facilitate proper sequencing and safety during rolling and supine>sit as well as provided with physical assistance to complete task    STS and transfer training - educated on hand/foot placement, safety, and sequencing during STS and pivot transfers using assistive device  Gait training - verbal cues for, upright posture, and safety during 90 and 180 degree turns during gait   Education in NW RLE. Use of call light for safety  Vitals and symptoms were closely monitored throughout session. Pt's/ family goals      Participate in Rehab process    Prognosis is good  for reaching above PT goals. Patient and or family understand(s) diagnosis, prognosis, and plan of care.  yes,     PHYSICAL THERAPY PLAN OF CARE:    PT POC is established based on physician order and patient diagnosis     Diagnosis:  Closed fracture of right tibia and fibula, initial encounter [S82.201A, S82.401A]  Tibial plateau fracture, right, closed, initial encounter [S82.141A]  Specific instructions for next treatment:  Transfer to bedside chair and Increase ambulation distance  Current Treatment Recommendations:     [x] Strengthening to improve independence with functional mobility   [x] ROM to improve independence with functional mobility   [x] Balance Training to improve static/dynamic balance and to reduce fall risk  [x] Endurance Training to improve activity tolerance during functional mobility   [x] Transfer Training to improve safety and independence with all functional transfers   [x] Gait Training to improve gait mechanics, endurance and asses need for appropriate assistive device  [x] Stair Training in preparation for safe discharge home and/or into the community when appropriate  [] Positioning to prevent skin breakdown and contractures  [x] Safety and Education Training   [] Patient/Caregiver Education   [] HEP  [] Gait Team to be added to POC  [] Other     PT long term treatment goals are located in above grid    Frequency of treatments: 2-5x/week x 1-2 weeks.     Time in  0830  Time out  0910    Total Treatment Time  25 minutes     Evaluation Time includes thorough review of current medical information, gathering information on past medical history/social history and prior level of function, completion of standardized testing/informal observation of tasks, assessment of data and education on plan of care and goals.     CPT codes:  [x] Low Complexity PT evaluation 07598  [] Moderate Complexity PT evaluation 99041  [] High Complexity PT evaluation 95730  [] PT Re-evaluation 42151  [] Gait training 19484 - minutes  [] Manual therapy 13568  minutes  [x] Therapeutic activities 63640 25 minutes  [] Therapeutic exercises 28660 - minutes  [] Neuromuscular reeducation [address] minutes     Luis Monson, [id number] [facility]

## 2023-02-07 NOTE — H&P
Hospital Medicine History & Physical      PCP: Silvestre Acuna MD    Date of Admission: 2/5/2023    Date of Service: . FEB 3, 2023    Chief Complaint:  FALL      History Of Present Illness:     [de-identified] y.o. female presented with FALL, SHE WAS GOING DOWN THE  BASEMENT STEPS AND MISSED  THE LAST STEP AND FELL ON HER KNEE, SUSTAINING A TIBIAL PLATEAU FRACTURE    Past Medical History:          Diagnosis Date    Osteopenia        Past Surgical History:          Procedure Laterality Date    BREAST BIOPSY      JOINT REPLACEMENT Right 02/22/2010    Right total hip arthroplasty. Roxy Langley MD    JOINT REPLACEMENT Left 11/05/2004    Hemiarthroplasty of the left shoulder. Roxy Wu MD    OTHER SURGICAL HISTORY Right 03/10/2009    Injection arthrogram right hip. Roxy Wu MD    THYROIDECTOMY, PARTIAL  1971    right left       Medications Prior to Admission:      Prior to Admission medications    Medication Sig Start Date End Date Taking? Authorizing Provider   aspirin 325 MG EC tablet Take 1 tablet by mouth 2 times daily for 28 days 2/6/23 3/6/23 Yes Harsh Gonzales DO   oxyCODONE-acetaminophen (PERCOCET) 5-325 MG per tablet Take 1 tablet by mouth every 6 hours as needed for Pain for up to 7 days. Intended supply: 7 days. Take lowest dose possible to manage pain Max Daily Amount: 4 tablets 2/6/23 2/13/23 Yes Harsh Gonzales DO   Cholecalciferol (VITAMIN D) 2000 UNITS CAPS capsule Take  by mouth. Historical Provider, MD   Ascorbic Acid (VITAMIN C) 500 MG CAPS Take 1,000 mg by mouth. Historical Provider, MD   Multiple Vitamins-Minerals (WOMENS BONE HEALTH) TABS Take  by mouth. Historical Provider, MD       Allergies:  Tetracyclines & related    Social History:      The patient currently lives ALONE    TOBACCO:   reports that she has never smoked.  She has never used smokeless tobacco.  ETOH:   reports no history of alcohol use. Family History:       Reviewed in detail and negative for DM, CAD, Cancer, CVA. Positive as follows:        Problem Relation Age of Onset    Osteoporosis Mother     Breast Cancer Maternal Aunt     Breast Cancer Maternal Aunt        REVIEW OF SYSTEMS:   Pertinent positives as noted in the HPI. All other systems reviewed and negative. PHYSICAL EXAM:    BP (!) 169/76   Pulse 76   Temp 97.9 °F (36.6 °C)   Resp 17   Ht 5' 5\" (1.651 m)   Wt 124 lb (56.2 kg)   SpO2 96%   BMI 20.63 kg/m²     General appearance:  No apparent distress, appears stated age. HEENT:  Normal cephalic, atraumatic without obvious deformity. Pupils equal, round, and reactive to light. Extra ocular muscles intact. Conjunctivae/corneas clear. Neck: Supple, with full range of motion. No jugular venous distention. Trachea midline. Respiratory:  Normal respiratory effort. Clear to auscultation,   Cardiovascular:  RRR  Abdomen: Soft, non-tender, non-distended with normal bowel sounds. Musculoskeletal:  No clubbing, cyanosis . EDEMA OF RLE  Skin: smooth and dry   Neurologic:   Cranial nerves: II-XII intact,   Psychiatric:  Alert and oriented x 3        Labs:     Recent Labs     02/06/23  0527   WBC 5.6   HGB 11.7   HCT 35.5        Recent Labs     02/06/23  0527      K 3.9      CO2 25   BUN 16   CREATININE 0.9   CALCIUM 8.5*     No results for input(s): AST, ALT, BILIDIR, BILITOT, ALKPHOS in the last 72 hours. Recent Labs     02/05/23  1646   INR 1.0     No results for input(s): Granados Palin in the last 72 hours. Urinalysis:      Lab Results   Component Value Date/Time    BLOODU neg 07/02/2013 01:13 PM    SPECGRAV <=1.005 07/02/2013 01:13 PM    GLUCOSEU neg 07/02/2013 01:13 PM       Radiology:           XR KNEE RIGHT (1-2 VIEWS)   Final Result   Interval placement of external fixation device across the right knee joint.          FLUORO FOR SURGICAL PROCEDURES   Final Result   Intraprocedural fluoroscopic spot images as above. See separate procedure   report for more information. CT KNEE RIGHT WO CONTRAST   Final Result   1. Comminuted displaced proximal tibia fracture involving lateral greater   than medial plateaus with fragment depression reaching 1.6 cm   2. Fibula head fracture         XR TIBIA FIBULA RIGHT (2 VIEWS)   Final Result   Depressed fracture of the lateral tibial plateau, minimally depressed   fracture of the proximal fibula. XR FEMUR RIGHT (MIN 2 VIEWS)   Final Result   No acute femoral fracture, proximal tibial fracture and fibular fracture   noted. XR KNEE RIGHT (MIN 4 VIEWS)   Final Result   1. Comminuted intra-articular fracture of the proximal tibia involving the   lateral tibial plateau with depression of the lateral tibial plateau   2. Nondisplaced fracture through the head of the fibula. 3. Dedicated CT of the right knee is recommended for further evaluation. ASSESSMENT:    Active Hospital Problems    Diagnosis Date Noted    Tibial plateau fracture, right, closed, initial encounter Tim Suarez 02/05/2023     Priority: Medium   HYPERTENSION  FALL    PLAN:  ORTHO    DVT Prophylaxis: LOVENOX IF OK WITH ORTHO  Diet: ADULT DIET; Regular  Code Status: Full Code    PT/OT Eval Status: ORDERED    Dispo - BAR VS HOME     Electronically signed by Fred Johns DO on 2/6/2023 at 7:36 PM Juliano Gonzalez       Thank you Juana Rich MD for the opportunity to be involved in this patient's care.  If you have any questions or concerns please feel free to contact me at [phone]

## 2023-02-07 NOTE — CARE COORDINATION
2/7. Met with the pt at the bedside to discuss transition of care. The pt was sitting in a chair with Right leg elevated. She lives alone and will need rehab. She would like to go to [place] [id number]. Referral made. Pt accepted. PASSR/ambulette from and envelope completed. The pt will not need a precert. Will follow.  Steve Guevara RN

## 2023-02-07 NOTE — PROGRESS NOTES
Physician Progress Note      Derick Singh  CSN #:                  [id number]  :                       1940  ADMIT DATE:       2023 1:10 PM  [address] [place] [place] DATE:  Olegario Simpson  PROVIDER #:        Drea Cee DO          QUERY TEXT:    Pt admitted with Tibial plateau fracture. Pt noted to have Osteopenia and low   bone mineral density per 2/ CT. If possible, please document in progress   notes and discharge summary if you are evaluating and/or treating any of the   following: The medical record reflects the following:  Risk Factors: Osteopenia  Clinical Indicators: Per  Operative note: osteopenic bone quality. Per 2   CT knee: There comminuted and displaced fractures of proximal tibia and   fibula. Bone mineral density appears low. Treatment: Closed reduction right bicondylar tibial plateau   fracture/Application right knee spanning external fixation    Thank you,  Aubrey Leija RN [facility]  [phone]  Options provided:  -- Pathological right bicondylar tibial plateau fracture due to osteopenia   following fall which would not usually break a normal, healthy bone  -- Traumatic right bicondylar tibial plateau fracture  -- Other - I will add my own diagnosis  -- Disagree - Not applicable / Not valid  -- Disagree - Clinically unable to determine / Unknown  -- Refer to Clinical Documentation Reviewer    PROVIDER RESPONSE TEXT:    This patient has a pathological right bicondylar tibial plateau fracture due   to osteopenia following fall which would not usually break a normal, healthy   bone.     Query created by: Rio Jessica on 2023 7:24 AM      Electronically signed by:  Drea Cee DO 2023 7:57 AM

## 2023-02-07 NOTE — ANESTHESIA POSTPROCEDURE EVALUATION
Department of Anesthesiology  Postprocedure Note    Patient: Grazyna Enciso  MRN: [id number]  [place]: 1940  Date of evaluation: 2/7/2023      Procedure Summary     Date: 02/06/23 [place] [facility] [place]  / [facility]    Anesthesia Start: 1126 Anesthesia Stop: 1215    Procedure: TIBIAL PLATEAU OPEN REDUCTION INTERNAL FIXATION (Right) Diagnosis:       Closed fracture of tibial plateau, unspecified laterality, initial encounter      (RIGHT CLOSED TIBIAL PLATEAU FRACTURE)    Surgeons: Geoffrey Orellana DO Responsible Provider: Vandana Jones DO    Anesthesia Type: General ASA Status: 1          Anesthesia Type: General    Arlen Phase I: Arlen Score: 10    Arlen Phase II:        Anesthesia Post Evaluation    Patient location during evaluation: PACU  Patient participation: complete - patient participated  Level of consciousness: awake and alert  Airway patency: patent  Nausea & Vomiting: no nausea and no vomiting  Complications: no  Cardiovascular status: blood pressure returned to baseline  Respiratory status: acceptable  Hydration status: euvolemic  Multimodal analgesia pain management approach

## 2023-02-08 VITALS
HEIGHT: 65 IN | RESPIRATION RATE: 18 BRPM | HEART RATE: 97 BPM | TEMPERATURE: 97.2 F | WEIGHT: 124 LBS | SYSTOLIC BLOOD PRESSURE: 162 MMHG | OXYGEN SATURATION: 96 % | DIASTOLIC BLOOD PRESSURE: 82 MMHG | BODY MASS INDEX: 20.66 KG/M2

## 2023-02-08 DIAGNOSIS — S82.141A TIBIAL PLATEAU FRACTURE, RIGHT, CLOSED, INITIAL ENCOUNTER: ICD-10-CM

## 2023-02-08 LAB
ANION GAP SERPL CALCULATED.3IONS-SCNC: 8 MMOL/L (ref 7–16)
BASOPHILS ABSOLUTE: 0.02 E9/L (ref 0–0.2)
BASOPHILS RELATIVE PERCENT: 0.3 % (ref 0–2)
BUN BLDV-MCNC: 15 MG/DL (ref 6–23)
CALCIUM SERPL-MCNC: 8.3 MG/DL (ref 8.6–10.2)
CHLORIDE BLD-SCNC: 107 MMOL/L (ref 98–107)
CO2: 25 MMOL/L (ref 22–29)
CREAT SERPL-MCNC: 0.9 MG/DL (ref 0.5–1)
EOSINOPHILS ABSOLUTE: 0.08 E9/L (ref 0.05–0.5)
EOSINOPHILS RELATIVE PERCENT: 1.3 % (ref 0–6)
GFR SERPL CREATININE-BSD FRML MDRD: >60 ML/MIN/1.73
GLUCOSE BLD-MCNC: 98 MG/DL (ref 74–99)
HCT VFR BLD CALC: 31.2 % (ref 34–48)
HEMOGLOBIN: 10.4 G/DL (ref 11.5–15.5)
IMMATURE GRANULOCYTES #: 0.03 E9/L
IMMATURE GRANULOCYTES %: 0.5 % (ref 0–5)
LYMPHOCYTES ABSOLUTE: 1.75 E9/L (ref 1.5–4)
LYMPHOCYTES RELATIVE PERCENT: 27.6 % (ref 20–42)
MCH RBC QN AUTO: 30.4 PG (ref 26–35)
MCHC RBC AUTO-ENTMCNC: 33.3 % (ref 32–34.5)
MCV RBC AUTO: 91.2 FL (ref 80–99.9)
MONOCYTES ABSOLUTE: 0.58 E9/L (ref 0.1–0.95)
MONOCYTES RELATIVE PERCENT: 9.1 % (ref 2–12)
NEUTROPHILS ABSOLUTE: 3.89 E9/L (ref 1.8–7.3)
NEUTROPHILS RELATIVE PERCENT: 61.2 % (ref 43–80)
PDW BLD-RTO: 13.3 FL (ref 11.5–15)
PLATELET # BLD: 267 E9/L (ref 130–450)
PMV BLD AUTO: 9.8 FL (ref 7–12)
POTASSIUM REFLEX MAGNESIUM: 4.2 MMOL/L (ref 3.5–5)
RBC # BLD: 3.42 E12/L (ref 3.5–5.5)
SODIUM BLD-SCNC: 140 MMOL/L (ref 132–146)
WBC # BLD: 6.4 E9/L (ref 4.5–11.5)

## 2023-02-08 PROCEDURE — 6360000002 HC RX W HCPCS: Performed by: INTERNAL MEDICINE

## 2023-02-08 PROCEDURE — 80048 BASIC METABOLIC PNL TOTAL CA: CPT

## 2023-02-08 PROCEDURE — 85025 COMPLETE CBC W/AUTO DIFF WBC: CPT

## 2023-02-08 PROCEDURE — 97530 THERAPEUTIC ACTIVITIES: CPT

## 2023-02-08 PROCEDURE — 97535 SELF CARE MNGMENT TRAINING: CPT

## 2023-02-08 PROCEDURE — 36415 COLL VENOUS BLD VENIPUNCTURE: CPT

## 2023-02-08 RX ADMIN — ENOXAPARIN SODIUM 40 MG: 100 INJECTION SUBCUTANEOUS at 09:10

## 2023-02-08 NOTE — PROGRESS NOTES
Department of Orthopedic Surgery  Resident Progress Note    Patient seen and examined. Pain controlled. Episode of transient bleeding through Ace wrap last night. Would like a phone call be made to her daughter. Denies chest pain, shortness of breath, dizziness/lightheadedness. VITALS:  BP (!) 148/78   Pulse 77   Temp 97.3 °F (36.3 °C) (Temporal)   Resp 16   Ht 5' 5\" (1.651 m)   Wt 124 lb (56.2 kg)   SpO2 97%   BMI 20.63 kg/m²     General: awake, alert cooperative in not acute distress. MUSCULOSKELETAL:   right lower extremity:  Saturated Ace wrap   ex fix in tact with no signs of loosening  Compartments soft and compressible  Patient is able to plantar flex she has some difficulty with DF and EHL  +2/4 DP & PT pulses, Brisk Cap refill, Toes warm and perfused  Distal sensation grossly intact but decreased to Peroneals, Sural, Saphenous, and tibial nrs    CBC:   Lab Results   Component Value Date/Time    WBC 6.4 02/08/2023 04:31 AM    HGB 10.4 02/08/2023 04:31 AM    HCT 31.2 02/08/2023 04:31 AM     02/08/2023 04:31 AM     PT/INR:    Lab Results   Component Value Date/Time    PROTIME 10.4 02/05/2023 04:46 PM    INR 1.0 02/05/2023 04:46 PM           ASSESSMENT  S/P Right bicondylar tibial plateau ex fix - 2/6    PLAN      Continue physical therapy and protocol: NWB - RLE  Reinforce dressing  Deep venous thrombosis prophylaxis - Lovenox, early mobilization  PT/OT  Pain Control: IV/p.o. transition to oral   Monitor H&H 10.4  D/C Plan:  per PT/OT/SW recs appreciate input.   Okay to discharge from orthopedic standpoint  Discuss with attending

## 2023-02-08 NOTE — CARE COORDINATION
2/8/23  Transition of Care Update. Patient is s/p right bicondylar tibial plateau ex fix. Patient is on IV Ancef with plan to transition to oral prior to discharge. Patient was evaluated by therapy with AM-PAC scores of 15/24 for OT and 14/24 for PT. Patient is planned to discharge to [facility] once medically stable. MAIKOL and destination complete. PASRR done and ambulette started and on the soft chart. NO pre-cert or COVID needed at discharge. SW/CM to follow. 11:00am Discharge order obtained. Transport arranged with the [facility]) for a 12:00 noon . Nursing and patient updated. Patient states she will call her family to update. No COVID needed.     Electronically signed by ABHINAV Howell on 2/8/2023 at 10:08 AM

## 2023-02-08 NOTE — PROGRESS NOTES
Hospitalist Progress Note      PCP: Beckie Dancer, MD    Date of Admission: 2/5/2023        Hospital Course:  [de-identified] y.o. female presented with FALL, SHE WAS GOING DOWN THE  BASEMENT STEPS AND MISSED  THE LAST STEP AND FELL ON HER KNEE, SUSTAINING A TIBIAL PLATEAU FRACTURE** ight knee spanning external fixation** BLOOD PRESSURE REMAINED ELEVATED, SHE WAS REFUSING MEDS, STATING THAT WHEN SHE CALMS DOWN IT WILL GO DOWN. Subjective: left before she could be seen           Medications:  Reviewed    Infusion Medications   Scheduled Medications   PRN Meds:       Intake/Output Summary (Last 24 hours) at 2/8/2023 1504  Last data filed at 2/8/2023 0900  Gross per 24 hour   Intake 250 ml   Output --   Net 250 ml       Exam:    BP (!) 162/82   Pulse 97   Temp 97.2 °F (36.2 °C) (Temporal)   Resp 18   Ht 5' 5\" (1.651 m)   Wt 124 lb (56.2 kg)   SpO2 96%   BMI 20.63 kg/m²           Labs:   Recent Labs     02/06/23 0527 02/07/23  0409 02/08/23  0431   WBC 5.6 9.5 6.4   HGB 11.7 11.1* 10.4*   HCT 35.5 33.8* 31.2*    276 267     Recent Labs     02/06/23 0527 02/07/23  0409 02/08/23  0431    138 140   K 3.9 4.2 4.2    105 107   CO2 25 20* 25   BUN 16 16 15   CREATININE 0.9 0.8 0.9   CALCIUM 8.5* 8.6 8.3*     No results for input(s): AST, ALT, BILIDIR, BILITOT, ALKPHOS in the last 72 hours. Recent Labs     02/05/23  1646   INR 1.0     No results for input(s): Imagene Plank in the last 72 hours. No results for input(s): AST, ALT, ALB, BILIDIR, BILITOT, ALKPHOS in the last 72 hours. No results for input(s): LACTA in the last 72 hours.   Lab Results   Component Value Date    LABURIC 4.2 05/27/2016     No results found for: AMMONIA    Assessment:    Active Hospital Problems    Diagnosis Date Noted    Tibial plateau fracture, right, closed, initial encounter [S82.141A] 02/05/2023     Priority: Medium   Application right knee spanning external fixation   HYPERTENSION  FALL HYPERGLYCEM(AIC 5.7 )  PREDIABETES    Plan: DC TO BAR    Electronically signed by Riddhi Valente DO on 2/8/2023 at 3:04 PM [facility]

## 2023-02-08 NOTE — PROGRESS NOTES
Call placed to [facility] of [address] to nurse called to Sridevi.

## 2023-02-08 NOTE — TELEPHONE ENCOUNTER
Received call from [facility] with [facility] stating that patient was just admitted to facility from hospital stay. Per [facility], paperwork states taht patient was ordered Percocet 5-325mg Q6HPRN however the prescription was not provided to facility. [facility] requesting script be faxed to [phone]. Medication pended and routed to providers for decision and signature.     Future Appointments   Date Time Provider Bonifacio Oliveira   2/14/2023  8:45 AM Armando Barros MD [facility]   9/28/2023 10:15 AM MD [facility]

## 2023-02-08 NOTE — PROGRESS NOTES
Occupational Therapy  OT BEDSIDE TREATMENT NOTE   [address] [facility] [address] [facility]  [address] [place] [address]      Date:2023  Patient Name: Ziggy Uribe  MRN: [id number]  : 1940  Room: [facility]     Evaluating OT:Oneyda Plaza OTR/L   License #  [id number]        Referring Provider: Liza Thomas DO    Specific Provider Orders/Date: OT evaluation & treatment        Diagnosis:   Right bicondylar tibial plateau fracture    Pertinent Medical History:  has a past medical history of Osteopenia. Surgery: 23: Closed reduction right bicondylar tibial plateau fracture required manipulation under general anesthesia  Application right knee spanning external fixation    Past Surgical History:  has a past surgical history that includes joint replacement (Right, 2010); joint replacement (Left, 2004); Thyroidectomy, partial (); other surgical history (Right, 03/10/2009); and Breast biopsy. Precautions:  Fall Risk, NWB R LE, R ext. Fix. Assessment of current deficits     Functional mobility            [x]ADLs           [x] Strength                  [x]Cognition    [x] Functional transfers          [x] IADLs         [x] Safety Awareness   [x]Endurance    [x] Fine Coordination                         [x] Balance      [] Vision/perception   [x]Sensation      []Gross Motor Coordination             [] ROM           [] Delirium                   [] Motor Control      OT PLAN OF CARE   OT POC based on physician orders, patient diagnosis and results of clinical assessment     Frequency/Duration: 2-4 days/wk for 2 weeks PRN   Specific OT Treatment Interventions to include:    Instruction/training on adapted ADL techniques and AE recommendations to increase functional independence within precautions  Training on energy conservation strategies, correct breathing pattern and techniques to improve independence/tolerance for self-care routine  Functional transfer/mobility training/DME recommendations for increased independence, safety, and fall prevention  Patient/Family education to increase follow through with safety techniques and functional independence  Recommendation of environmental modifications for increased safety with functional transfers/mobility and ADLs  Therapeutic exercise to improve motor endurance, ROM, and functional strength for ADLs/functional transfers  Therapeutic activities to facilitate/challenge dynamic balance, stand tolerance for increased safety and independence with ADLs  Therapeutic activities to facilitate gross/fine motor skills for increased independence with ADLs  Positioning to improve skin integrity, interaction with environment and functional independence     Recommended Adaptive Equipment:  TBD      Home Living: Pt lives alone in a 2 story with 3 steps to enter with no HR. B&B on main level. Bathroom setup: high toilet, walk in shower  Equipment owned: shower bench, ww     Prior Level of Function: Ind. with ADLs , Ind. with IADLs; ambulated no A.D. Driving: active  Occupation:      Pain Level: no pain  Cognition: A&O: 4/4; Follows multi- step directions              Memory:  G              Sequencing:  G              Problem solving:  G              Judgement/safety:  G                Functional Assessment:  AM-PAC Daily Activity Raw Score: 16/24    Initial Eval Status  Date: 2-7-23 Treatment Status  Date: 2/8/23 STGs = LTGs  Time frame: 10-14 days   Feeding Ind.  ind Mod I/ Ind   Grooming Set up seated Set up  To wash face, apply deodorant, and brush teeth seated  Modified [place]    UB Dressing SBA to caitlin robe SBA  To don/doff gowns seated EOB  Modified [place]    LB Dressing Dep B socks  Max A  To don/doff socks and don underwear and boots seated and standing to pull over hips Modified [place]    Bathing Max A sim.  task Mod A  Seated EOB and standing for posterior  Modified [place]    Toileting Max A for hygiene Mod A- clothing management  SBA- hygiene seated  Modified [place]    Bed Mobility  Supine to sit: Min A  Sit to supine: NT   SBA- supine<->sit  Educated pt on technique to increase independence. Supine to sit: Modified [place]   Sit to supine: Modified [place]    Functional Transfers Min A with sit <> stand with ww. Mod A with SPT using ww Min A- sit<->stand  Cuing for hand placement and body mechanics  Modified [place]    Functional Mobility Mod A with ww few steps while maintaining NWB R LE Min A  Stand pivot transfer using w/w  Modified [place]    Balance Sitting:     Static:  Sup    Dynamic:SBA  Standing: Min/Mod A Sitting:     Static:  Sup    Dynamic:SBA  Standing: Min A     Activity Tolerance F F+  G   Visual/  Perceptual Glasses: no          Vitals WFL   WFL       Comments: Upon arrival pt supine in bed. Pt educated on techniques to increase independence and safety during ADL's, bed mobility, and functional transfers. At end of session pt left seated upright in bed, call light within reach. Pt has made fair progress towards set goals.      Continue with current plan of care    Treatment Time In: 11:10            Treatment Time Out: 11:35             Treatment Charges: Mins Units   Ther Ex  [id number]     Manual Therapy [address] [facility]     Thera Activities [id number] 10 1   ADL/Home Mgt 32639 15 1   Neuro Re-ed [id number]     Group Therapy      Orthotic manage/training  [id number]     Non-Billable Time     Total Timed Treatment 25 2     Gisella Keen

## 2023-02-09 ENCOUNTER — TELEPHONE (OUTPATIENT)
Dept: ORTHOPEDIC SURGERY | Age: 83
End: 2023-02-09

## 2023-02-09 LAB
ALBUMIN SERPL-MCNC: 3.5 G/DL (ref 3.5–5.2)
ALP BLD-CCNC: 86 U/L (ref 35–104)
ALT SERPL-CCNC: <5 U/L (ref 0–32)
ANION GAP SERPL CALCULATED.3IONS-SCNC: 10 MMOL/L (ref 7–16)
AST SERPL-CCNC: 13 U/L (ref 0–31)
BILIRUB SERPL-MCNC: 0.6 MG/DL (ref 0–1.2)
BUN BLDV-MCNC: 19 MG/DL (ref 6–23)
CALCIUM SERPL-MCNC: 8.8 MG/DL (ref 8.6–10.2)
CHLORIDE BLD-SCNC: 105 MMOL/L (ref 98–107)
CHOLESTEROL, TOTAL: 229 MG/DL (ref 0–199)
CO2: 26 MMOL/L (ref 22–29)
CREAT SERPL-MCNC: 0.8 MG/DL (ref 0.5–1)
FOLATE: 8.7 NG/ML (ref 4.8–24.2)
GFR SERPL CREATININE-BSD FRML MDRD: >60 ML/MIN/1.73
GLUCOSE BLD-MCNC: 109 MG/DL (ref 74–99)
HCT VFR BLD CALC: 34.4 % (ref 34–48)
HDLC SERPL-MCNC: 75 MG/DL
HEMOGLOBIN: 11.4 G/DL (ref 11.5–15.5)
LDL CHOLESTEROL CALCULATED: 137 MG/DL (ref 0–99)
MCH RBC QN AUTO: 29.6 PG (ref 26–35)
MCHC RBC AUTO-ENTMCNC: 33.1 % (ref 32–34.5)
MCV RBC AUTO: 89.4 FL (ref 80–99.9)
PDW BLD-RTO: 13.2 FL (ref 11.5–15)
PLATELET # BLD: 282 E9/L (ref 130–450)
PMV BLD AUTO: 9.9 FL (ref 7–12)
POTASSIUM SERPL-SCNC: 4.2 MMOL/L (ref 3.5–5)
RBC # BLD: 3.85 E12/L (ref 3.5–5.5)
SODIUM BLD-SCNC: 141 MMOL/L (ref 132–146)
T4 TOTAL: 7.1 MCG/DL (ref 4.5–11.7)
TOTAL PROTEIN: 6.2 G/DL (ref 6.4–8.3)
TRIGL SERPL-MCNC: 86 MG/DL (ref 0–149)
TSH SERPL DL<=0.05 MIU/L-ACNC: 2.12 UIU/ML (ref 0.27–4.2)
VITAMIN B-12: 221 PG/ML (ref 211–946)
VITAMIN D 25-HYDROXY: 26 NG/ML (ref 30–100)
VLDLC SERPL CALC-MCNC: 17 MG/DL
WBC # BLD: 5.5 E9/L (ref 4.5–11.5)

## 2023-02-09 RX ORDER — OXYCODONE HYDROCHLORIDE AND ACETAMINOPHEN 5; 325 MG/1; MG/1
1 TABLET ORAL EVERY 6 HOURS PRN
Qty: 28 TABLET | Refills: 0 | Status: SHIPPED | OUTPATIENT
Start: 2023-02-09 | End: 2023-02-16

## 2023-02-09 NOTE — TELEPHONE ENCOUNTER
Called and spoke with Hien Charles advised that dressing is to remain intact until post op appointment and if there is drainage she should reinforce with ABD and ace wrap unless the dressing becomes excessive. Hien Charles verbalized understanding.      Future Appointments   Date Time Provider Bonifacio Oliveira   2/14/2023  8:45 AM SCHEDULE, SE ORTHO APC SE Ortho [facility]   9/28/2023 10:15 AM Júnior Cadena MD [facility]

## 2023-02-09 NOTE — TELEPHONE ENCOUNTER
-- DO NOT REPLY / DO NOT REPLY ALL --  -- Message is from the [facility]--    General Patient Message      Reason for Call: Patient called - stated she missed a call  Please follow- up    Caller Information       Type Contact Phone    05/23/2022 06:56 AM CDT Phone (Incoming) Venita Ackerman (Self) [phone] (M)          Alternative phone number: none        Did the caller agree that this message can wait until the office reopens on Monday (or after the holiday)? YES - The Message Can Wait      Send a message to the provider's clinical support pool.     Turnaround time given to caller:   \"This message will be sent to [state Provider's name]. The clinical team will fulfill your request as soon as they review your message when the office opens on Monday (or after the holiday).\"       Julienne Point message states that she is calling to verify patient's dressing orders. Requests call back.      Future Appointments   Date Time Provider Bonifacio Oliveira   2/14/2023  8:45 AM SCHEDULE, SE ORTHO APC SE Ortho [facility]   9/28/2023 10:15 AM Juana Rich MD [facility]

## 2023-02-09 NOTE — TELEPHONE ENCOUNTER
Tevin Heredia from [facility] called in requesting to make a Follow up appointment for patient. Call placed, LVM, appointment already scheduled for patient.     Future Appointments   Date Time Provider Bonifacio Oliveira   2/14/2023  8:45 AM SCHEDULE, SE ORTHO APC  Ortho [facility]   9/28/2023 10:15 AM Arcelia Fatima MD [facility]

## 2023-02-09 NOTE — TELEPHONE ENCOUNTER
Orders signed. Please see attached. Thank you.   Electronically signed by Amna Akbar PA-C on 2/9/2023 at 7:51 AM

## 2023-02-10 ENCOUNTER — CARE COORDINATION (OUTPATIENT)
Dept: CARE COORDINATION | Age: 83
End: 2023-02-10

## 2023-02-10 NOTE — CARE COORDINATION
[facility] Update Call    2/10/2023    Patient: Radha Miller Patient : 1940   MRN: <[id number]>  Reason for Admission: Tibial plateau fracture, right, closed, initial encounter   Discharge Date: 23 RARS: Readmission Risk Score: 9.9         Care Transitions Post Acute Facility Update    Care Transitions Interventions  Post Acute Facility: [facility] Update      Email to [facility], advised SNF Pac team will also be following patient during SNF stay, requested update

## 2023-02-10 NOTE — DISCHARGE SUMMARY
Hospitalist Discharge Summary    Patient ID: Stacy Herrera   Patient : 1940  Patient's PCP: Nicole Zuluaga MD    Admit Date: 2023   Admitting Physician: Roz Bernheim, MD    Discharge Date:  2/10/2023   Discharge Physician: Eric Rodriguez DO   Discharge Condition: Stable  Discharge Disposition: Skilled Facility      Discharge Diagnoses: Active Hospital Problems    Diagnosis Date Noted    Tibial plateau fracture, right, closed, initial encounter [S82.141A] 2023     Priority: Medium   Application right knee spanning external fixation   HYPERTENSION(UNCONTROLLED) REFUSING TREATMENT  FALL   HYPERGLYCEM(AIC 5.7 )  PREDIABETES        Hospital course in brief:82 y.o. female presented with FALL, SHE WAS GOING DOWN THE  BASEMENT STEPS AND MISSED  THE LAST STEP AND FELL ON HER KNEE, SUSTAINING A TIBIAL PLATEAU FRACTURE** ight knee spanning external fixation** BLOOD PRESSURE REMAINED ELEVATED, SHE WAS REFUSING MEDS, STATING THAT WHEN SHE CALMS DOWN IT WILL GO DOWN.           PHYSICAL EXAM:    BP (!) 162/82   Pulse 97   Temp 97.2 °F (36.2 °C) (Temporal)   Resp 18   Ht 5' 5\" (1.651 m)   Wt 124 lb (56.2 kg)   SpO2 96%   BMI 20.63 kg/m²     LEFT BEFORE SHE COULD BE SEEN    Prior to Admission medications    Medication Sig Start Date End Date Taking? Authorizing Provider   aspirin 325 MG EC tablet Take 1 tablet by mouth 2 times daily for 28 days 2/6/23 3/6/23 Yes Flakita Mckeon DO   oxyCODONE-acetaminophen (PERCOCET) 5-325 MG per tablet Take 1 tablet by mouth every 6 hours as needed for Pain for up to 7 days. Intended supply: 7 days. Take lowest dose possible to manage pain Max Daily Amount: 4 tablets 23  [place] PA-C   Cholecalciferol (VITAMIN D) 2000 UNITS CAPS capsule Take  by mouth. Historical Provider, MD   Ascorbic Acid (VITAMIN C) 500 MG CAPS Take 1,000 mg by mouth.       Historical Provider, MD   Multiple Vitamins-Minerals ([address]) TABS Take  by mouth. Historical Provider, MD       Consults:   IP CONSULT TO ORTHOPEDIC SURGERY  IP CONSULT TO INTERNAL MEDICINE  IP CONSULT TO ANESTHESIOLOGY            Discharge Instructions / Follow up:    Future Appointments   Date Time Provider Bonifacio Oliveira   2/14/2023  8:45 AM SCHEDULE, SE ORTHO APC SE Ortho [facility]   9/28/2023 10:15 AM MD Rocio Herrera  [facility]       Continued appropriate risk factor modification of blood pressure, diabetes and serum lipids will remain essential to reducing risk of future atherosclerotic development    Activity: activity as tolerated    Significant labs:  CBC:   Recent Labs     02/08/23 0431 02/09/23  0400   WBC 6.4 5.5   RBC 3.42* 3.85   HGB 10.4* 11.4*   HCT 31.2* 34.4   MCV 91.2 89.4   RDW 13.3 13.2    282     BMP:   Recent Labs     02/08/23 0431 02/09/23  0400    141   K 4.2 4.2    105   CO2 25 26   BUN 15 19   CREATININE 0.9 0.8     LFT:  Recent Labs     02/09/23  0400   PROT 6.2*   ALKPHOS 86   ALT <5   AST 13   BILITOT 0.6     PT/INR: No results for input(s): INR, APTT in the last 72 hours. BNP: No results for input(s): BNP in the last 72 hours. Hgb A1C:   Lab Results   Component Value Date    LABA1C 5.7 (H) 02/07/2023     Folate and B12:   Lab Results   Component Value Date    AANLMCUE51 028 02/09/2023   ,   Lab Results   Component Value Date    FOLATE 8.7 02/09/2023     Thyroid Studies:   Lab Results   Component Value Date    TSH 2.120 02/09/2023    V4XBWWX 7.1 02/09/2023       Urinalysis:    Lab Results   Component Value Date/Time    BLOODU neg 07/02/2013 01:13 PM    SPECGRAV <=1.005 07/02/2013 01:13 PM    GLUCOSEU neg 07/02/2013 01:13 PM       Imaging:  XR FEMUR RIGHT (MIN 2 VIEWS)    Result Date: 2/5/2023  EXAMINATION: XRAY VIEWS OF THE RIGHT FEMUR 2/5/2023 1:57 pm COMPARISON: None.  HISTORY: ORDERING SYSTEM PROVIDED HISTORY: fall TECHNOLOGIST PROVIDED HISTORY: Reason for exam:->fall What reading provider will be dictating this exam?->CRC FINDINGS: No acute femoral fracture. There is normal alignment. No acute joint abnormality. No focal osseous lesion. No focal soft tissue abnormality. There is a right total hip arthroplasty without complication. Lateral view of the proximal tibia suggest posterior tibial plateau fracture, mildly depressed with fracture of the tibia. Radiographs recommended. No acute femoral fracture, proximal tibial fracture and fibular fracture noted. XR KNEE RIGHT (1-2 VIEWS)    Result Date: 2/6/2023  EXAMINATION: 4 XRAY VIEWS OF THE RIGHT KNEE 2/6/2023 3:42 pm COMPARISON: CT right knee from February 5, 2023 HISTORY: ORDERING SYSTEM PROVIDED HISTORY: post op for pacu TECHNOLOGIST PROVIDED HISTORY: Reason for exam:->post op for pacu What reading provider will be dictating this exam?->CRC FINDINGS: Partially imaged external fixation hardware across the right knee joint is new from prior exam.  Proximal tibia comminuted intra-articular fracture again noted. There is a large joint effusion. Interval placement of external fixation device across the right knee joint. XR KNEE RIGHT (MIN 4 VIEWS)    Result Date: 2/5/2023  EXAMINATION: FOUR XRAY VIEWS OF THE RIGHT KNEE 2/5/2023 1:57 pm COMPARISON: None. HISTORY: ORDERING SYSTEM PROVIDED HISTORY: fall, r/o fx TECHNOLOGIST PROVIDED HISTORY: Reason for exam:->fall, r/o fx What reading provider will be dictating this exam?->CRC FINDINGS: There is a comminuted intra-articular fracture involving the lateral tibial plateau. There is depression of the lateral tibial plateau. There is a nondisplaced fracture through the head of the fibula. There is no appreciable fracture of the distal femur. 1. Comminuted intra-articular fracture of the proximal tibia involving the lateral tibial plateau with depression of the lateral tibial plateau 2. Nondisplaced fracture through the head of the fibula. 3. Dedicated CT of the right knee is recommended for further evaluation. XR TIBIA FIBULA RIGHT (2 VIEWS)    Result Date: 2/5/2023  EXAMINATION: 3 XRAY VIEWS OF THE RIGHT TIBIA AND FIBULA 2/5/2023 1:57 pm COMPARISON: None. HISTORY: ORDERING SYSTEM PROVIDED HISTORY: fall TECHNOLOGIST PROVIDED HISTORY: Reason for exam:->fall What reading provider will be dictating this exam?->CRC FINDINGS: There is an acute depressed fracture of the lateral tibial plateau and a minimally displaced fracture of the proximal fibular head. Medial tibial plateau appears intact. Bone density is mildly diminished. Distal tibia and fibula appear normal.     Depressed fracture of the lateral tibial plateau, minimally depressed fracture of the proximal fibula. CT KNEE RIGHT WO CONTRAST    Result Date: 2/5/2023  EXAMINATION: CT OF THE RIGHT KNEE WITHOUT CONTRAST 2/5/2023 6:34 pm TECHNIQUE: CT of the right knee was performed without the administration of intravenous contrast.  Multiplanar reformatted images are provided for review. Automated exposure control, iterative reconstruction, and/or weight based adjustment of the mA/kV was utilized to reduce the radiation dose to as low as reasonably achievable. COMPARISON: None. HISTORY ORDERING SYSTEM PROVIDED HISTORY: Preoperative planning TECHNOLOGIST PROVIDED HISTORY: Reason for exam:->Preoperative planning What reading provider will be dictating this exam?->CRC FINDINGS: Bones: There comminuted and displaced fractures of proximal tibia and fibula. Bone mineral density appears low. The tibia fracture is worst at the lateral femoral condyle with fragment depression reaching approximately 1.6 cm, extends through the midline to involve the medial femoral condyle as well where there is fracture and depression to a lesser degree Soft Tissue: Injury related soft tissue swelling and hemorrhage present most evident laterally in the subcutaneous fat at the knee level, extends inferiorly to the lower margin of the exam, mid lower leg, where it is more evident anteriorly. Arterial calcifications present Joint: There is chondrocalcinosis and mild DJD. Positive joint effusion in keeping with the intra-articular fractures including popliteal bursa     1. Comminuted displaced proximal tibia fracture involving lateral greater than medial plateaus with fragment depression reaching 1.6 cm 2. Fibula head fracture     FLUORO FOR SURGICAL PROCEDURES    Result Date: 2/6/2023  EXAMINATION: SPOT FLUOROSCOPIC IMAGES 2/6/2023 12:14 pm TECHNIQUE: Fluoroscopy was provided by the radiology department for procedure. Radiologist was not present during examination. FLUOROSCOPY DOSE AND TYPE: Radiation Exposure Index: Fluoroscopy time equals 13.8 seconds. Total dose equals 0.9 mGy, COMPARISON: None HISTORY: ORDERING SYSTEM PROVIDED HISTORY: EX FIX RT Lower Extremity TECHNOLOGIST PROVIDED HISTORY: Reason for exam:->EX FIX RT Lower Extremity What reading provider will be dictating this exam?->CRC Intraprocedural imaging. 4 FINDINGS: 4 spot images of the lower extremity were obtained. Intraprocedural fluoroscopic spot images as above. See separate procedure report for more information.        Discharge Medications:      Medication List        START taking these medications      aspirin 325 MG EC tablet  Take 1 tablet by mouth 2 times daily for 28 days            CONTINUE taking these medications      Vitamin C 500 MG Caps     vitamin D 50 MCG (2000 UT) Caps capsule     Womens Bone Health Tabs               Where to Get Your Medications        These medications were sent to Author Dominic Nunes [address], [address] [facility] [phone] [address]      Phone: [phone]   aspirin 325 MG EC tablet         Time Spent on discharge is 30 minutes in the review of medications and discharge plan .    +++++++++++++++++++++++++++++++++++++++++++++++++  Aron Rodriguez, DO  [address] [facility], [place]  +++++++++++++++++++++++++++++++++++++++++++++++++  NOTE: This report was transcribed using voice recognition software. Every effort was made to ensure accuracy; however, inadvertent computerized transcription errors may be present.

## 2023-02-11 LAB
SARS-COV-2: NOT DETECTED
SOURCE: NORMAL

## 2023-02-12 LAB — SOURCE: NORMAL

## 2023-02-14 ENCOUNTER — TELEPHONE (OUTPATIENT)
Dept: ORTHOPEDIC SURGERY | Age: 83
End: 2023-02-14

## 2023-02-14 ENCOUNTER — OFFICE VISIT (OUTPATIENT)
Dept: ORTHOPEDIC SURGERY | Age: 83
End: 2023-02-14
Payer: MEDICARE

## 2023-02-14 ENCOUNTER — ANESTHESIA EVENT (OUTPATIENT)
Dept: OPERATING ROOM | Age: 83
End: 2023-02-14
Payer: MEDICARE

## 2023-02-14 ENCOUNTER — HOSPITAL ENCOUNTER (OUTPATIENT)
Dept: GENERAL RADIOLOGY | Age: 83
Discharge: HOME | End: 2023-02-16
Payer: MEDICARE

## 2023-02-14 DIAGNOSIS — Z09 FOLLOW-UP EXAM: Primary | ICD-10-CM

## 2023-02-14 DIAGNOSIS — Z09 FOLLOW-UP EXAM: ICD-10-CM

## 2023-02-14 DIAGNOSIS — S82.141A TIBIAL PLATEAU FRACTURE, RIGHT, CLOSED, INITIAL ENCOUNTER: Primary | ICD-10-CM

## 2023-02-14 PROBLEM — Z96.698 PRESENCE OF OTHER ORTHOPEDIC JOINT IMPLANTS: Status: ACTIVE | Noted: 2023-02-14

## 2023-02-14 PROCEDURE — 73562 X-RAY EXAM OF KNEE 3: CPT

## 2023-02-14 PROCEDURE — 99024 POSTOP FOLLOW-UP VISIT: CPT | Performed by: PHYSICIAN ASSISTANT

## 2023-02-14 PROCEDURE — 99213 OFFICE O/P EST LOW 20 MIN: CPT

## 2023-02-14 RX ORDER — BISACODYL 10 MG
10 SUPPOSITORY, RECTAL RECTAL DAILY PRN
COMMUNITY

## 2023-02-14 NOTE — H&P (VIEW-ONLY)
Orthopaedic H&P Note    Ki Player is a [de-identified] y.o. female, her YOB: 1940 with the following history as recorded in [facility]:      Patient Active Problem List    Diagnosis Date Noted    Tibial plateau fracture, right, closed, initial encounter 02/05/2023     Current Outpatient Medications   Medication Sig Dispense Refill    bisacodyl (DULCOLAX) 10 MG suppository Place 10 mg rectally daily as needed for Constipation Q24HR for constipation      magnesium hydroxide (MILK OF MAGNESIA) 400 MG/5ML suspension Take by mouth daily as needed for Constipation      oxyCODONE-acetaminophen (PERCOCET) 5-325 MG per tablet Take 1 tablet by mouth every 6 hours as needed for Pain for up to 7 days. Intended supply: 7 days. Take lowest dose possible to manage pain Max Daily Amount: 4 tablets 28 tablet 0    aspirin 325 MG EC tablet Take 1 tablet by mouth 2 times daily for 28 days 56 tablet 0    Cholecalciferol (VITAMIN D) 2000 UNITS CAPS capsule Take  by mouth. Ascorbic Acid (VITAMIN C) 500 MG CAPS Take 1,000 mg by mouth. No current facility-administered medications for this visit. Allergies: Tetracyclines & related  Past Medical History:   Diagnosis Date    Osteopenia      Past Surgical History:   Procedure Laterality Date    BREAST BIOPSY      JOINT REPLACEMENT Right 02/22/2010    Right total hip arthroplasty. Sonya Serra MD    JOINT REPLACEMENT Left 11/05/2004    Hemiarthroplasty of the left shoulder. Sonya Wu MD    LEG SURGERY Right 2/6/2023    TIBIAL PLATEAU OPEN REDUCTION INTERNAL FIXATION performed by Liliana Fowler DO at [address] [place] [address] Right 03/10/2009    Injection arthrogram right hip. Sonya Wu MD    THYROIDECTOMY, PARTIAL  1971    right left     Family History   Problem Relation Age of Onset    Osteoporosis Mother     Breast Cancer Maternal Aunt     Breast Cancer Maternal Aunt      Social History     Tobacco Use    Smoking status: Never    Smokeless tobacco: Never   Substance Use Topics    Alcohol use: No                             SUBJECTIVE: Verner Richards is here for initial evaluation in the outpatient orthopedic office for right tibial plateau fracture sustained approximately 1 week ago. She has maintained right lower extremity external fixator. Currently living in rehab facility. She is nonweightbearing right lower extremity. States that she has no pain. Does not note any paresthesias. Denies any fever, chills, calf pain, chest pain, shortness of breath. She is here today to check pin sites and reevaluate skin and discussion of subsequent removal of external fixator and ORIF of right tibial plateau fracture    Review of Systems   Constitutional: Negative for fever, chills, diaphoresis, appetite change and fatigue. HENT: Negative for dental issues, hearing loss and tinnitus. Negative for congestion, sinus pressure, sneezing, sore throat. Negative for headache. Eyes: Negative for visual disturbance, blurred and double vision. Negative for pain, discharge, redness and itching  Respiratory: Negative for cough, shortness of breath and wheezing. Cardiovascular: Negative for chest pain, palpitations and leg swelling. No dyspnea on exertion   Gastrointestinal:   Negative for nausea, vomiting, abdominal pain, diarrhea, constipation  or black or bloody. Hematologic\Lymphatic:  negative for bleeding, petechiae,   Genitourinary: Negative for hematuria and difficulty urinating. Musculoskeletal: Negative for neck pain and stiffness. Mild for back pain, negative joint swelling and gait problem. Skin: Negative for pallor, rash and wound. Neurological: Negative for dizziness, tremors, seizures, weakness, light-headedness, no TIA or stroke symptoms. No numbness and headaches. Psychiatric/Behavioral: Negative.      Physical Examination:   General appearance: alert, well appearing, and in no distress,  normal appearing weight  Mental status: alert, oriented to person, place, and time, normal mood, behavior, speech, dress, motor activity, and thought processes  Abdomen: soft, nondistended   Resp:   resp easy and unlabored, no audible wheezes note  Cardiac: distal pulses palpable, skin well perfused  Neurological: alert, oriented X3, normal speech, no focal findings or movement disorder noted, motor and sensory grossly normal bilaterally, normal muscle tone, no tremors, strength 5/5, normal gait and station  HEENT: normochephalic atraumatic, external ears and eyes normal, sclera normal, neck supple  Extremities:   peripheral pulses normal, no edema, redness or tenderness in the calves   Skin: normal coloration, no rashes or open wounds, no suspicious skin lesions noted  Psych: Affect euthymic   Musculoskeletal:    Extremity:  Right Lower Extremity  Skin clean dry and intact, without signs of infection  External fixator intact without any gross loosening  No warmth or erythema or drainage at pin sites  Nontender throughout the right leg  Wrinkle sign positive, only mild edema throughout the right lower extremity at this time  Compartments supple throughout thigh and leg  Calf supple and nontender  Demonstrates active knee flexion/extension, ankle plantar/dorsiflexion/great toe extension. States sensation intact to touch in sural/deep peroneal/superficial peroneal/saphenous/posterior tibial nerve distributions to foot/ankle. Palpable dorsalis pedis and posterior tibialis pulses, cap refill brisk in toes, foot warm/perfused. XR: 2/14/23     2 views of R knee demonstrating bicondylar tibial plateau fracture with external fixator intact. No significant change in alignment. No other fractures or dislocations or any other osseus abnormality identified.       CT of right knee reviewed from 2/5/2023    Narrative   EXAMINATION:   CT OF THE RIGHT KNEE WITHOUT CONTRAST 2/5/2023 6:34 pm       TECHNIQUE:   CT of the right knee was performed without the administration of intravenous   contrast.  Multiplanar reformatted images are provided for review. Automated   exposure control, iterative reconstruction, and/or weight based adjustment of   the mA/kV was utilized to reduce the radiation dose to as low as reasonably   achievable. COMPARISON:   None. HISTORY   ORDERING SYSTEM PROVIDED HISTORY: Preoperative planning   TECHNOLOGIST PROVIDED HISTORY:   Reason for exam:->Preoperative planning   What reading provider will be dictating this exam?->CRC       FINDINGS:   Bones: There comminuted and displaced fractures of proximal tibia and fibula. Bone mineral density appears low. The tibia fracture is worst at the lateral   femoral condyle with fragment depression reaching approximately 1.6 cm,   extends through the midline to involve the medial femoral condyle as well   where there is fracture and depression to a lesser degree       Soft Tissue: Injury related soft tissue swelling and hemorrhage present most   evident laterally in the subcutaneous fat at the knee level, extends   inferiorly to the lower margin of the exam, mid lower leg, where it is more   evident anteriorly. Arterial calcifications present       Joint: There is chondrocalcinosis and mild DJD. Positive joint effusion in   keeping with the intra-articular fractures including popliteal bursa           Impression   1. Comminuted displaced proximal tibia fracture involving lateral greater   than medial plateaus with fragment depression reaching 1.6 cm   2. Fibula head fracture           ASSESSMENT:     Diagnosis Orders   1. Tibial plateau fracture, right, closed, initial encounter            Discussion:  Had lengthy discussion with patient regarding Her diagnosis, typical prognosis, and expected outcomes. I reviewed the possible complications from the injury itself despite treatment choosen.  I also discussed treatment options including nonoperative managements versus surgical management, along with risks and benefits of each. They have elected for surgical management at this time. Risk, benefits and treatment options discussed with Jones Santiago. she has verbalized understanding of options. The possibility of complications were also discussed to include but not limited to nerve damage, infection, problems with wound healing, vascular injury, chronic pain, stiffness, dysfunction, nonhealing of the bone, symptomatic hardware and/or its failure, need for subsequent surgery, dislocation, and blood clots as well as medical related problems and other problems not specifically discussed. Risk of anesthesia also discussed to include death. Post-op care, work, activity and restrictions which included the use of pain medication and possibility of using blood thinner post op were also discussed with Jones Santiago and she verbalized and agreed with the restrictions. PLAN:  OPEN REDUCTION WITH INTERNAL FIXATION OF RIGHT TIBIAL PLATEAU WITH REMOVAL OF RIGHT LOWER EXTREMITY EXTERNAL FIXATOR      Your surgery is scheduled for Wednesday, 2/15/23 at 7:00 AM  with Dr. Vitaliy Ibarra DO at the main [address] [place] [address] in [facility] . You will need to report to Preop area  that morning at 5:00 AM    You are having Outpatient surgery, but plan for 1-2 nights in the hospital for recovery if needed. Preadmission Testing (PAT) department at [facility] will contact you with further details regarding pre-operative blood work, where to park and enter the hospital, your medication list, etc.   Nothing to eat or drink after midnight the night before surgery. You may take a pain pill and any other medicine PAT instructs you to take with small sip of water if needed. Keep pin sites clean and dry. Continue with ice and elevation to reduce swelling  No weight bearing right lower extremity, use assistive devices if needed (wheelchair, walker, crutches, cane, etc).   If you are taking Aspirin or Lovenox, hold the day of surgery. If taking Eliquis, hold this 48 hours prior to surgery. Hold all NSAIDs (non-steroidal anti-inflammatories like Advil, motrin, ibuprofen, etc) 7 days prior to surgery. Call office with any question or concerns: [phone]    All surgical patients will be gradually tapered off narcotic pain medication post operatively, this office will not continue narcotics longer than 6 weeks from date of surgery. If narcotics are required longer than this time period you will be referred to pain management. Electronically signed by Siria Rhodes PA-C on 2/14/2023 at 9:28 AM  Note: This report was completed using [employer] voiced recognition software. Every effort has been made to ensure accuracy; however, inadvertent computerized transcription errors may be present.

## 2023-02-14 NOTE — TELEPHONE ENCOUNTER
Zan's message states that he has questions about patient's surgery set up tomorrow. Requests call back.      Future Appointments   Date Time Provider Bonifacio Oliveira   3/6/2023  8:45 AM SE Belgica ORTHO [facility]   9/28/2023 10:15 AM Nuris Mei MD [facility]

## 2023-02-14 NOTE — PATIENT INSTRUCTIONS
OPEN REDUCTION WITH INTERNAL FIXATION OF RIGHT TIBIAL PLATEAU WITH REMOVAL OF RIGHT LOWER EXTREMITY EXTERNAL FIXATOR      Your surgery is scheduled for Wednesday, 2/15/23 at 7:00 AM  with Dr. Garcia Martel DO at the main [address] [place] [address] on [facility] in [facility] . You will need to report to Preop area  that morning at 5:00 AM    You are having Outpatient surgery, but plan for 1-2 nights in the hospital for recovery if needed. Preadmission Testing (PAT) department at [facility] will contact you with further details regarding pre-operative blood work, where to park and enter the hospital, your medication list, etc.   Nothing to eat or drink after midnight the night before surgery. You may take a pain pill and any other medicine PAT instructs you to take with small sip of water if needed. Keep pin sites clean and dry. Continue with ice and elevation to reduce swelling  No weight bearing right lower extremity, use assistive devices if needed (wheelchair, walker, crutches, cane, etc). If you are taking Aspirin or Lovenox, hold the day of surgery. If taking Eliquis, hold this 48 hours prior to surgery. Hold all NSAIDs (non-steroidal anti-inflammatories like Advil, motrin, ibuprofen, etc) 7 days prior to surgery. Call office with any question or concerns: [phone]    All surgical patients will be gradually tapered off narcotic pain medication post operatively, this office will not continue narcotics longer than 6 weeks from date of surgery. If narcotics are required longer than this time period you will be referred to pain management. Open Reduction With Internal Fixation of a Limb: What to Expect at [facility]  Your broken bone (fracture) was put into position and stabilized. You can expect some pain and swelling around the cut (incision) the doctor made. This should get better within a few days after your surgery.  But it is normal to have some pain for 2 to 3 weeks after surgery and mild pain for up to 6 weeksafter surgery. How soon you can return to work and your normal routine depends on your job and how long it takes the bone to heal. For example, if you have a fractured leg and you sit at work, you may be able to go back in 1 to 2 weeks. But if your job requires you to walk or stand a lot, you will need to wait until yourfracture has healed before you go back to work. This care sheet gives you a general idea about how long it will take for you to recover. But each person recovers at a different pace. Follow the steps belowto get better as quickly as possible. How can you care for yourself at home? Activity    Rest when you feel tired. Getting enough sleep will help you recover. Increase your activity as recommended by your doctor. Being active boosts blood flow and helps prevent pneumonia and constipation. It's usually okay to exercise other parts of your body as soon as you feel well enough. Avoid putting weight on your repaired bone until your doctor says it is okay. You will probably need to take 1 to 2 weeks off from work. It depends on the type of work you do and how you feel. Do not shower for 1 or 2 days after surgery. When you shower, keep your dressing and incisions dry. If you have a cast, tape a sheet of plastic to cover it so that it does not get wet. It may help to sit on a shower stool. Do not take a bath, swim, use a hot tub, or soak your affected limb until your incision is healed. This usually takes 1 to 2 weeks. Diet    You can eat your normal diet. If your stomach is upset, try bland, low-fat foods like plain rice, broiled chicken, toast, and yogurt. Medicines    Your doctor will tell you if and when you can restart your medicines. He or she will also give you instructions about taking any new medicines. If you take aspirin or some other blood thinner, ask your doctor if and when to start taking it again.  Make sure that you understand exactly what your doctor wants you to do. Take pain medicines exactly as directed. If the doctor gave you a prescription medicine for pain, take it as prescribed. If you are not taking a prescription pain medicine, ask your doctor if you can take an over-the-counter medicine. If you think your pain medicine is making you sick to your stomach: Take your medicine after meals (unless your doctor has told you not to). Ask your doctor for a different pain medicine. If your doctor prescribed antibiotics, take them as directed. Do not stop taking them just because you feel better. You need to take the full course of antibiotics. Incision care    If you have strips of tape on the incision, leave the tape on for a week or until it falls off. If you do not have a cast, clean the incision 2 times a day after your doctor allows you to remove the bandage. Use only soap and water to clean the incision unless your doctor gives you different instructions. Don't use hydrogen peroxide or alcohol, which can slow healing. Exercise    Do exercises as instructed by your doctor or physical therapist. These exercises will help keep your muscles strong and your joints flexible while your bone is healing. Wiggle your fingers or toes on the injured arm or leg often. This helps reduce swelling and stiffness. Ice and elevation    Prop up the injured arm or leg on a pillow when you ice it or anytime you sit or lie down during the first 1 to 2 weeks after your surgery. Try to keep it above the level of your heart. This will help reduce swelling and pain. Other instructions    If you have a cast or splint:  Keep it dry. If you have a removable splint, ask your doctor if it is okay to take it off to bathe. Your doctor may want you to keep it on as much as possible. Be careful not to put the splint on too tight. Do not stick objects such as pencils or coat hangers in your cast or splint to scratch your skin.   Do not put powder into your cast or splint to relieve itchy skin. Never cut or alter your cast or splint. Follow-up care is a key part of your treatment and safety. Be sure to make and go to all appointments, and call your doctor if you are having problems. It's also a good idea to know your test results and keep alist of the medicines you take. When should you call for help? Call 911 anytime you think you may need emergency care. For example, call if:    You passed out (lost consciousness). You have severe trouble breathing. You have sudden chest pain and shortness of breath, or you cough up blood. Call your doctor now or seek immediate medical care if:    You have pain that does not get better after you take pain medicine. Your fingers or toes on the injured arm or leg are cool, pale, or change color. You have tingling or numbness in your fingers or toes. You cannot move your fingers or toes. Your cast or splint feels too tight. The skin under your cast or splint is burning or stinging. You have signs of infection, such as: Increased pain, swelling, warmth, or redness. Red streaks leading from the incision. Pus draining from the incision. A fever. You have drainage or a bad smell coming from the cast or splint. You have signs of a blood clot, such as:  Pain in your calf, back of the knee, thigh, or groin. Redness and swelling in your leg or groin. You have new or worse nausea or vomiting. You are too sick to your stomach to drink any fluids. You cannot keep down fluids. Watch closely for any changes in your health, and be sure to contact yourdoctor if:    You have any problems with your cast or splint. Where can you learn more? Go to https://chjenniferewhermelinda.[employer]. org and sign in to your [employer] account. Enter E369 in the [employer] box to learn more about \"Open Reduction With Internal Fixation of a Limb: What to Expect at Home. \" If you do not have an account, please click on the \"Sign Up Now\" link. Current as of: March 9, 2022               Content Version: 13.3  © 2006-2022 Healthwise, [employer]. Care instructions adapted under license by [facility] ([facility]). If you have questions about a medical condition or this instruction, always ask your healthcare professional. [address] any warranty or liability for your use of this information. After Fracture Nutrition Recommendations:  After a fracture, your bone needs to rebuild. A healthy, well-balanced diet rich in key nutrients can help speed that up. You don't need to take supplements unless your doctor recommends it. They don't always work well. It's much better to get the nutrition you need from your plate, not from a pill. Protein  About half your bone's structure is made of this. When you have a fracture, your body needs it to build new bone for the repair. It also helps your body take in and use calcium, another key nutrient for healthy bones. Good sources: Meat, fish, milk, cheese, cottage cheese, yogurt, nuts, seeds, beans, soy products, and fortified cereals. Calcium  This mineral also helps you build strong bones, so foods and drinks rich in it can help your bone fracture heal. Adults should get between 1,000 and 1,200 milligrams of calcium each day. Your doctor will tell you if you need a calcium supplement, and what amount you should take if you do. Good sources: Milk, yogurt, cheese, cottage cheese, broccoli, turnip or kwadwo greens, kale, bok lincoln, soy, beans, canned tuna or salmon with bones, almond milk, and fortified cereals or juice. Vitamin D  This vitamin should be a part of your diet to help your fracture heal. It helps your blood take in and use calcium and build up the minerals in your bones.     You get some vitamin D when sunlight hits your skin, so it can be a good idea to spend a short amount of time outdoors each day -- 15 minutes may be enough for a fair-skinned person. Vitamin D is found naturally in only a few foods like egg yolks and fatty fish, but manufacturers add it to other foods, like milk or orange juice. Adults should get at least 600 IU of vitamin D every day, and if you're over 70 you should get at least 800 IU. Good sources: Swordfish, salmon, cod liver oil, sardines, liver, fortified milk or yogurt, egg yolks, and fortified orange juice. Vitamin C  Collagen is a protein that's an important building block for bone. Vitamin C helps your body make collagen, which helps your bone fracture heal. You can get it from many tasty, fresh fruits and veggies. Aged or heated produce can lose some of its vitamin C, so go for fresh or frozen. Good sources: Citrus fruits like oranges, kiwi fruit, berries, tomatoes, peppers, potatoes, and green vegetables. Iron  If you have iron-deficiency anemia -- when you don't have enough healthy red blood cells -- you may heal more slowly after a fracture. Iron helps your body make collagen to rebuild bone. It also plays a part in getting oxygen into your bones to help them heal.    Good sources: Red meat, dark-meat chicken or turkey, oily fish, eggs, dried fruits, leafy green veggies, whole-grain breads, and fortified cereals. Potassium  Get enough of this mineral in your diet, and you won't lose as much calcium when you pee. There are lots of fresh fruits rich in potassium. Good sources: Bananas, orange juice, potatoes, nuts, seeds, fish, meat, and milk. What Not to Eat  It's a good idea to cut back on or skip these:    Alcohol: While you don't have to cut out alcoholic drinks, these beverages slow down bone healing. You won't build new bone as fast to fix the fracture. A bit too much alcohol can also make you unsteady on your feet, which can make you more likely to fall and risk an injury to the same bone.     Salt: Too much of this in your diet can make you lose more calcium in your urine. Salt can be in some foods or drinks that don't taste salty, so check labels and aim for about 1 teaspoon, or 6 grams, a day.    Coffee: Lots of caffeine -- more than four cups of strong coffee a day -- can slow down bone healing a little. It might make you pee more, and that could mean you lose more calcium through your urine. A moderate amount of coffee or tea should be fine.

## 2023-02-14 NOTE — PROGRESS NOTES
[address]   PRE-ADMISSION TESTING GENERAL INSTRUCTIONS  [facility] Phone Number: [phone]      GENERAL INSTRUCTIONS:    [x] Antibacterial Soap Shower Night before or AM of surgery. [x] Do not wear makeup, lotions, powders, deodorant. [x] Nothing by mouth after midnight; including gum, candy, mints, or water. [x] You may brush your teeth, gargle, but do NOT swallow water. [x] No tobacco products, illegal drugs, or alcohol within 24 hours of your surgery. [x] Jewelry or valuables should not be brought to the hospital. All body and/or tongue piercing's must be removed prior to arriving to hospital. No contact lens or hair pins. PARKING INSTRUCTIONS:     [x] ARRIVAL DATE & TIME: 2/15/23 at 0500  [x] Enter into the [employer]. Two people may accompany you. Masks are not required but are recommended. [x] Parking Lot \"I\" is where you will park. It is located on the corner of [address] and [address]. The entrance is on [address]. Upon entering the parking lot, a voucher ticket will print    MEDICATION INSTRUCTIONS:    [x] Bring a complete list of your medications, please write the last time you took the medicine, give this list to the nurse in Pre-Op. [x] Take only the following medications the morning of surgery with 1-2 ounces of water: May take percocet if needed. [x] Stop all herbal supplements and vitamins 5 days before surgery. Stop NSAIDS 7 days before surgery. [x] Follow physician instructions regarding any blood thinners you may be taking. WHAT TO EXPECT:    [x] The day of surgery you will be greeted and checked in by the [employer].  In addition, you will be registered in the [place] by a Patient Access Representative. Please bring your photo ID and insurance card. A nurse will greet you in accordance to the time you are needed in the pre-op area to prepare you for surgery.  Please do not be discouraged if you are not greeted in the order you arrive as there are many variables that are involved in patient preparation. Your patience is greatly appreciated as you wait for your nurse. Please bring in items such as: books, magazines, newspapers, electronics, or any other items  to occupy your time in the waiting area. [x]  Delays may occur with surgery and staff will make a sincere effort to keep you informed of delays. If any delays occur with your procedure, we apologize ahead of time for your inconvenience as we recognize the value of your time.

## 2023-02-14 NOTE — TELEPHONE ENCOUNTER
Called and spoke with Eduardo Sahni he states that PAT called and was able to answer all questions regarding surgery set up. No further questions at this time.      Future Appointments   Date Time Provider Bonifacio Oliveira   3/6/2023  8:45 AM [facility]  [facility]   9/28/2023 10:15 AM MD Rocio Moya [facility]

## 2023-02-14 NOTE — TELEPHONE ENCOUNTER
Due to CMS Billing Guidelines, CPT 82413 is a Inpatient Code. Surgery Status for tomorrow will need to be Inpatient. Please place pre-op orders.

## 2023-02-14 NOTE — PROGRESS NOTES
Orthopaedic H&P Note    Joana Tao is a [de-identified] y.o. female, her YOB: 1940 with the following history as recorded in [facility]:      Patient Active Problem List    Diagnosis Date Noted    Tibial plateau fracture, right, closed, initial encounter 02/05/2023     Current Outpatient Medications   Medication Sig Dispense Refill    bisacodyl (DULCOLAX) 10 MG suppository Place 10 mg rectally daily as needed for Constipation Q24HR for constipation      magnesium hydroxide (MILK OF MAGNESIA) 400 MG/5ML suspension Take by mouth daily as needed for Constipation      oxyCODONE-acetaminophen (PERCOCET) 5-325 MG per tablet Take 1 tablet by mouth every 6 hours as needed for Pain for up to 7 days. Intended supply: 7 days. Take lowest dose possible to manage pain Max Daily Amount: 4 tablets 28 tablet 0    aspirin 325 MG EC tablet Take 1 tablet by mouth 2 times daily for 28 days 56 tablet 0    Cholecalciferol (VITAMIN D) 2000 UNITS CAPS capsule Take  by mouth. Ascorbic Acid (VITAMIN C) 500 MG CAPS Take 1,000 mg by mouth. No current facility-administered medications for this visit. Allergies: Tetracyclines & related  Past Medical History:   Diagnosis Date    Osteopenia      Past Surgical History:   Procedure Laterality Date    BREAST BIOPSY      JOINT REPLACEMENT Right 02/22/2010    Right total hip arthroplasty. Jackie Rodríguez. Michael Murphy MD    JOINT REPLACEMENT Left 11/05/2004    Hemiarthroplasty of the left shoulder. Jackie Rodríguez. MD Bryce    LEG SURGERY Right 2/6/2023    TIBIAL PLATEAU OPEN REDUCTION INTERNAL FIXATION [facility] DO [facility] Right 03/10/2009    Injection arthrogram right hip. Jackie Wu MD    THYROIDECTOMY, PARTIAL  1971    right left     Family History   Problem Relation Age of Onset    Osteoporosis Mother     Breast Cancer Maternal Aunt     Breast Cancer Maternal Aunt      Social History     Tobacco Use    Smoking status: Never    Smokeless tobacco: Never   Substance Use Topics    Alcohol use: No                             SUBJECTIVE: Tavon Fisher is here for initial evaluation in the outpatient orthopedic office for right tibial plateau fracture sustained approximately 1 week ago. She has maintained right lower extremity external fixator. Currently living in rehab facility. She is nonweightbearing right lower extremity. States that she has no pain. Does not note any paresthesias. Denies any fever, chills, calf pain, chest pain, shortness of breath. She is here today to check pin sites and reevaluate skin and discussion of subsequent removal of external fixator and ORIF of right tibial plateau fracture    Review of Systems   Constitutional: Negative for fever, chills, diaphoresis, appetite change and fatigue. HENT: Negative for dental issues, hearing loss and tinnitus. Negative for congestion, sinus pressure, sneezing, sore throat. Negative for headache. Eyes: Negative for visual disturbance, blurred and double vision. Negative for pain, discharge, redness and itching  Respiratory: Negative for cough, shortness of breath and wheezing. Cardiovascular: Negative for chest pain, palpitations and leg swelling. No dyspnea on exertion   Gastrointestinal:   Negative for nausea, vomiting, abdominal pain, diarrhea, constipation  or black or bloody. Hematologic\Lymphatic:  negative for bleeding, petechiae,   Genitourinary: Negative for hematuria and difficulty urinating. Musculoskeletal: Negative for neck pain and stiffness. Mild for back pain, negative joint swelling and gait problem. Skin: Negative for pallor, rash and wound. Neurological: Negative for dizziness, tremors, seizures, weakness, light-headedness, no TIA or stroke symptoms. No numbness and headaches. Psychiatric/Behavioral: Negative.      Physical Examination:   General appearance: alert, well appearing, and in no distress,  normal appearing weight  Mental status: alert, oriented to person, place, and time, normal mood, behavior, speech, dress, motor activity, and thought processes  Abdomen: soft, nondistended   Resp:   resp easy and unlabored, no audible wheezes note  Cardiac: distal pulses palpable, skin well perfused  Neurological: alert, oriented X3, normal speech, no focal findings or movement disorder noted, motor and sensory grossly normal bilaterally, normal muscle tone, no tremors, strength 5/5, normal gait and station  HEENT: normochephalic atraumatic, external ears and eyes normal, sclera normal, neck supple  Extremities:   peripheral pulses normal, no edema, redness or tenderness in the calves   Skin: normal coloration, no rashes or open wounds, no suspicious skin lesions noted  Psych: Affect euthymic   Musculoskeletal:    Extremity:  Right Lower Extremity  Skin clean dry and intact, without signs of infection  External fixator intact without any gross loosening  No warmth or erythema or drainage at pin sites  Nontender throughout the right leg  Wrinkle sign positive, only mild edema throughout the right lower extremity at this time  Compartments supple throughout thigh and leg  Calf supple and nontender  Demonstrates active knee flexion/extension, ankle plantar/dorsiflexion/great toe extension. States sensation intact to touch in sural/deep peroneal/superficial peroneal/saphenous/posterior tibial nerve distributions to foot/ankle. Palpable dorsalis pedis and posterior tibialis pulses, cap refill brisk in toes, foot warm/perfused. XR: 2/14/23     2 views of R knee demonstrating bicondylar tibial plateau fracture with external fixator intact. No significant change in alignment. No other fractures or dislocations or any other osseus abnormality identified.       CT of right knee reviewed from 2/5/2023    Narrative   EXAMINATION:   CT OF THE RIGHT KNEE WITHOUT CONTRAST 2/5/2023 6:34 pm       TECHNIQUE:   CT of the right knee was performed without the administration of intravenous   contrast.  Multiplanar reformatted images are provided for review. Automated   exposure control, iterative reconstruction, and/or weight based adjustment of   the mA/kV was utilized to reduce the radiation dose to as low as reasonably   achievable. COMPARISON:   None. HISTORY   ORDERING SYSTEM PROVIDED HISTORY: Preoperative planning   TECHNOLOGIST PROVIDED HISTORY:   Reason for exam:->Preoperative planning   What reading provider will be dictating this exam?->CRC       FINDINGS:   Bones: There comminuted and displaced fractures of proximal tibia and fibula. Bone mineral density appears low. The tibia fracture is worst at the lateral   femoral condyle with fragment depression reaching approximately 1.6 cm,   extends through the midline to involve the medial femoral condyle as well   where there is fracture and depression to a lesser degree       Soft Tissue: Injury related soft tissue swelling and hemorrhage present most   evident laterally in the subcutaneous fat at the knee level, extends   inferiorly to the lower margin of the exam, mid lower leg, where it is more   evident anteriorly. Arterial calcifications present       Joint: There is chondrocalcinosis and mild DJD. Positive joint effusion in   keeping with the intra-articular fractures including popliteal bursa           Impression   1. Comminuted displaced proximal tibia fracture involving lateral greater   than medial plateaus with fragment depression reaching 1.6 cm   2. Fibula head fracture           ASSESSMENT:     Diagnosis Orders   1. Tibial plateau fracture, right, closed, initial encounter            Discussion:  Had lengthy discussion with patient regarding Her diagnosis, typical prognosis, and expected outcomes. I reviewed the possible complications from the injury itself despite treatment choosen.  I also discussed treatment options including nonoperative managements versus surgical management, along with risks and benefits of each. They have elected for surgical management at this time. Risk, benefits and treatment options discussed with Katelynn Killian. she has verbalized understanding of options. The possibility of complications were also discussed to include but not limited to nerve damage, infection, problems with wound healing, vascular injury, chronic pain, stiffness, dysfunction, nonhealing of the bone, symptomatic hardware and/or its failure, need for subsequent surgery, dislocation, and blood clots as well as medical related problems and other problems not specifically discussed. Risk of anesthesia also discussed to include death. Post-op care, work, activity and restrictions which included the use of pain medication and possibility of using blood thinner post op were also discussed with Katelynn Killian and she verbalized and agreed with the restrictions. PLAN:  OPEN REDUCTION WITH INTERNAL FIXATION OF RIGHT TIBIAL PLATEAU WITH REMOVAL OF RIGHT LOWER EXTREMITY EXTERNAL FIXATOR      Your surgery is scheduled for Wednesday, 2/15/23 at 7:00 AM  with Dr. Deepika Manzano DO at the [facility] . You will need to report to Preop area  that morning at 5:00 AM    You are having Outpatient surgery, but plan for 1-2 nights in the hospital for recovery if needed. Preadmission Testing (PAT) department at [facility] will contact you with further details regarding pre-operative blood work, where to park and enter the hospital, your medication list, etc.   Nothing to eat or drink after midnight the night before surgery. You may take a pain pill and any other medicine PAT instructs you to take with small sip of water if needed. Keep pin sites clean and dry. Continue with ice and elevation to reduce swelling  No weight bearing right lower extremity, use assistive devices if needed (wheelchair, walker, crutches, cane, etc).   If you are taking Aspirin or Lovenox, hold the day of surgery. If taking Eliquis, hold this 48 hours prior to surgery. Hold all NSAIDs (non-steroidal anti-inflammatories like Advil, motrin, ibuprofen, etc) 7 days prior to surgery. Call office with any question or concerns: [phone]    All surgical patients will be gradually tapered off narcotic pain medication post operatively, this office will not continue narcotics longer than 6 weeks from date of surgery. If narcotics are required longer than this time period you will be referred to pain management. Electronically signed by Sj Cotton PA-C on 2/14/2023 at 9:28 AM  Note: This report was completed using [employer] voiced recognition software. Every effort has been made to ensure accuracy; however, inadvertent computerized transcription errors may be present.

## 2023-02-14 NOTE — TELEPHONE ENCOUNTER
Prior Authorization Form:    DEMOGRAPHICS:                     Patient Name:  Alber Mccarthy  Patient :  1940            Insurance:  Payor: MEDICARE / Plan: MEDICARE PART A AND B / Product Type: *No Product type* /   Insurance ID Number:    Payer/Plan Subscr  Sex Relation Sub. Ins. ID Effective Group Num   1.  MEDICARE - ME* ABIOLA ROSARIO* 1940 Female Self [id number]                                    PO [address]        [] Prior authorization obtained    DIAGNOSIS & PROCEDURE:                       Procedure/Operation: Removal of External Fixator from Right Lower Extremity, Right Tibial Plateau Fracture ORIF           CPT Code: 72258, 32421    Diagnosis:  Closed, Right Tibial Plateau Fracture, S/P External Fixation on Right Lower Extremity    ICD10 Code: U20.561J, T88.943    Location:  [facility]    Surgeon:  Dr. Stefan Buenrostro INFORMATION:                          Date: 2-   Time: 7 am              Anesthesia:  General                                                       Status:  Inpatient        Special Comments:         Vendor: [employer]  []   Notified     Length of Surgery (with 30 min clean up time): 2 hours    Medical clearance: No Medical Clearance Needed    Pre-Op Labs:       []  Orders Placed    Electronically signed by Ricky Hansen MA on 2023 at 10:16 AM

## 2023-02-15 ENCOUNTER — ANESTHESIA (OUTPATIENT)
Dept: OPERATING ROOM | Age: 83
End: 2023-02-15
Payer: MEDICARE

## 2023-02-15 ENCOUNTER — HOSPITAL ENCOUNTER (INPATIENT)
Age: 83
LOS: 1 days | Discharge: NURSING, REHAB OR LONG-TERM CARE | End: 2023-02-16
Attending: ORTHOPAEDIC SURGERY | Admitting: ORTHOPAEDIC SURGERY
Payer: MEDICARE

## 2023-02-15 ENCOUNTER — APPOINTMENT (OUTPATIENT)
Dept: GENERAL RADIOLOGY | Age: 83
End: 2023-02-15
Attending: ORTHOPAEDIC SURGERY
Payer: MEDICARE

## 2023-02-15 DIAGNOSIS — Z96.698 PRESENCE OF OTHER ORTHOPEDIC JOINT IMPLANTS: ICD-10-CM

## 2023-02-15 DIAGNOSIS — Z01.810 PRE-OPERATIVE CARDIOVASCULAR EXAMINATION: Primary | ICD-10-CM

## 2023-02-15 DIAGNOSIS — Z12.31 BREAST CANCER SCREENING BY MAMMOGRAM: ICD-10-CM

## 2023-02-15 DIAGNOSIS — S82.141A CLOSED BICONDYLAR FRACTURE OF RIGHT TIBIAL PLATEAU: ICD-10-CM

## 2023-02-15 DIAGNOSIS — S82.141A TIBIAL PLATEAU FRACTURE, RIGHT, CLOSED, INITIAL ENCOUNTER: ICD-10-CM

## 2023-02-15 LAB
ABO/RH: NORMAL
ANTIBODY SCREEN: NORMAL
EKG ATRIAL RATE: 70 BPM
EKG P AXIS: 60 DEGREES
EKG P-R INTERVAL: 164 MS
EKG Q-T INTERVAL: 410 MS
EKG QRS DURATION: 88 MS
EKG QTC CALCULATION (BAZETT): 442 MS
EKG R AXIS: -2 DEGREES
EKG T AXIS: 49 DEGREES
EKG VENTRICULAR RATE: 70 BPM
INR BLD: 1
PROTHROMBIN TIME: 10.9 SEC (ref 9.3–12.4)
SARS-COV-2: NOT DETECTED
SOURCE: NORMAL

## 2023-02-15 PROCEDURE — 64445 NJX AA&/STRD SCIATIC NRV IMG: CPT | Performed by: ANESTHESIOLOGY

## 2023-02-15 PROCEDURE — 36415 COLL VENOUS BLD VENIPUNCTURE: CPT

## 2023-02-15 PROCEDURE — 2580000003 HC RX 258: Performed by: PHYSICIAN ASSISTANT

## 2023-02-15 PROCEDURE — 1200000000 HC SEMI PRIVATE

## 2023-02-15 PROCEDURE — 2709999900 HC NON-CHARGEABLE SUPPLY: Performed by: ORTHOPAEDIC SURGERY

## 2023-02-15 PROCEDURE — 6360000002 HC RX W HCPCS: Performed by: PHYSICIAN ASSISTANT

## 2023-02-15 PROCEDURE — 93005 ELECTROCARDIOGRAM TRACING: CPT | Performed by: ANESTHESIOLOGY

## 2023-02-15 PROCEDURE — 85610 PROTHROMBIN TIME: CPT

## 2023-02-15 PROCEDURE — 2580000003 HC RX 258: Performed by: NURSE ANESTHETIST, CERTIFIED REGISTERED

## 2023-02-15 PROCEDURE — 6360000002 HC RX W HCPCS: Performed by: ORTHOPAEDIC SURGERY

## 2023-02-15 PROCEDURE — 6360000002 HC RX W HCPCS: Performed by: ANESTHESIOLOGY

## 2023-02-15 PROCEDURE — 0QSG04Z REPOSITION RIGHT TIBIA WITH INTERNAL FIXATION DEVICE, OPEN APPROACH: ICD-10-PCS | Performed by: ORTHOPAEDIC SURGERY

## 2023-02-15 PROCEDURE — C1713 ANCHOR/SCREW BN/BN,TIS/BN: HCPCS | Performed by: ORTHOPAEDIC SURGERY

## 2023-02-15 PROCEDURE — 6360000002 HC RX W HCPCS: Performed by: NURSE ANESTHETIST, CERTIFIED REGISTERED

## 2023-02-15 PROCEDURE — 3209999900 FLUORO FOR SURGICAL PROCEDURES

## 2023-02-15 PROCEDURE — 93010 ELECTROCARDIOGRAM REPORT: CPT | Performed by: INTERNAL MEDICINE

## 2023-02-15 PROCEDURE — 97165 OT EVAL LOW COMPLEX 30 MIN: CPT

## 2023-02-15 PROCEDURE — 3700000000 HC ANESTHESIA ATTENDED CARE: Performed by: ORTHOPAEDIC SURGERY

## 2023-02-15 PROCEDURE — 0QPL05Z REMOVAL OF EXTERNAL FIXATION DEVICE FROM RIGHT TARSAL, OPEN APPROACH: ICD-10-PCS | Performed by: ORTHOPAEDIC SURGERY

## 2023-02-15 PROCEDURE — 3700000001 HC ADD 15 MINUTES (ANESTHESIA): Performed by: ORTHOPAEDIC SURGERY

## 2023-02-15 PROCEDURE — 73560 X-RAY EXAM OF KNEE 1 OR 2: CPT

## 2023-02-15 PROCEDURE — 7100000000 HC PACU RECOVERY - FIRST 15 MIN: Performed by: ORTHOPAEDIC SURGERY

## 2023-02-15 PROCEDURE — 2500000003 HC RX 250 WO HCPCS: Performed by: NURSE ANESTHETIST, CERTIFIED REGISTERED

## 2023-02-15 PROCEDURE — 2580000003 HC RX 258: Performed by: STUDENT IN AN ORGANIZED HEALTH CARE EDUCATION/TRAINING PROGRAM

## 2023-02-15 PROCEDURE — 86900 BLOOD TYPING SEROLOGIC ABO: CPT

## 2023-02-15 PROCEDURE — 3600000005 HC SURGERY LEVEL 5 BASE: Performed by: ORTHOPAEDIC SURGERY

## 2023-02-15 PROCEDURE — 6370000000 HC RX 637 (ALT 250 FOR IP): Performed by: STUDENT IN AN ORGANIZED HEALTH CARE EDUCATION/TRAINING PROGRAM

## 2023-02-15 PROCEDURE — 7100000001 HC PACU RECOVERY - ADDTL 15 MIN: Performed by: ORTHOPAEDIC SURGERY

## 2023-02-15 PROCEDURE — 64447 NJX AA&/STRD FEMORAL NRV IMG: CPT | Performed by: ANESTHESIOLOGY

## 2023-02-15 PROCEDURE — L3650 SO 8 ABD RESTRAINT PRE OTS: HCPCS | Performed by: ORTHOPAEDIC SURGERY

## 2023-02-15 PROCEDURE — 6360000002 HC RX W HCPCS: Performed by: STUDENT IN AN ORGANIZED HEALTH CARE EDUCATION/TRAINING PROGRAM

## 2023-02-15 PROCEDURE — 86901 BLOOD TYPING SEROLOGIC RH(D): CPT

## 2023-02-15 PROCEDURE — 20694 RMVL EXT FIXJ SYS UNDER ANES: CPT | Performed by: ORTHOPAEDIC SURGERY

## 2023-02-15 PROCEDURE — 3E0T3BZ INTRODUCTION OF ANESTHETIC AGENT INTO PERIPHERAL NERVES AND PLEXI, PERCUTANEOUS APPROACH: ICD-10-PCS | Performed by: ANESTHESIOLOGY

## 2023-02-15 PROCEDURE — 2720000010 HC SURG SUPPLY STERILE: Performed by: ORTHOPAEDIC SURGERY

## 2023-02-15 PROCEDURE — 3600000015 HC SURGERY LEVEL 5 ADDTL 15MIN: Performed by: ORTHOPAEDIC SURGERY

## 2023-02-15 PROCEDURE — 97530 THERAPEUTIC ACTIVITIES: CPT

## 2023-02-15 PROCEDURE — 86850 RBC ANTIBODY SCREEN: CPT

## 2023-02-15 PROCEDURE — 27536 TREAT KNEE FRACTURE: CPT | Performed by: ORTHOPAEDIC SURGERY

## 2023-02-15 DEVICE — SCREW BNE L65MM DIA3.5MM CORT S STL ST LOK FULL THRD: Type: IMPLANTABLE DEVICE | Site: LEG | Status: FUNCTIONAL

## 2023-02-15 DEVICE — GRAFT BNE SUB 15ML 1.7-10MM CANC CHIP MORSELIZED FRZ DRY: Type: IMPLANTABLE DEVICE | Site: LEG | Status: FUNCTIONAL

## 2023-02-15 DEVICE — IMPLANTABLE DEVICE: Type: IMPLANTABLE DEVICE | Site: LEG | Status: FUNCTIONAL

## 2023-02-15 DEVICE — GII QUICKANCHOR PLUS SIZE 2 (5 METRIC) PANACRYL BRAIDED ABSORBABLE SUTURE 36 INCHES (91CM), DOUBLE ARMED WITH CP-2 NEEDLES, WITH DISPOSABLE INSERTER.
Type: IMPLANTABLE DEVICE | Site: LEG | Status: FUNCTIONAL
Brand: GII QUICKANCHOR PANACRYL

## 2023-02-15 DEVICE — PLATE BNE L102MM 6 H ST R PROX BILAT TIB S STL NEUT LOK: Type: IMPLANTABLE DEVICE | Site: LEG | Status: FUNCTIONAL

## 2023-02-15 DEVICE — SCREW BNE L44MM DIA3.5MM CORT S STL ST NONCANNULATED LOK: Type: IMPLANTABLE DEVICE | Site: LEG | Status: FUNCTIONAL

## 2023-02-15 DEVICE — PLATE BNE L79MM 2 H ST POST MED PROX TIB S STL LOK COMPR: Type: IMPLANTABLE DEVICE | Site: LEG | Status: FUNCTIONAL

## 2023-02-15 DEVICE — SCREW BNE L60MM DIA3.5MM CORT S STL ST LOK FULL THRD: Type: IMPLANTABLE DEVICE | Site: LEG | Status: FUNCTIONAL

## 2023-02-15 DEVICE — SCREW BNE L36MM DIA3.5MM CORT S STL ST NONCANNULATED LOK: Type: IMPLANTABLE DEVICE | Site: LEG | Status: FUNCTIONAL

## 2023-02-15 DEVICE — SCREW BNE L30MM DIA3.5MM CORT S STL ST NONCANNULATED LOK: Type: IMPLANTABLE DEVICE | Site: LEG | Status: FUNCTIONAL

## 2023-02-15 DEVICE — SCREW BNE L65MM DIA3.5MM CORT S STL ST FULL THRD HEX RECESS: Type: IMPLANTABLE DEVICE | Site: LEG | Status: FUNCTIONAL

## 2023-02-15 DEVICE — SCREW BNE L60MM DIA3.5MM CORT S STL ST NONCANNULATED LOK: Type: IMPLANTABLE DEVICE | Site: LEG | Status: FUNCTIONAL

## 2023-02-15 DEVICE — SCREW BNE L42MM DIA3.5MM CORT S STL ST NONCANNULATED LOK: Type: IMPLANTABLE DEVICE | Site: LEG | Status: FUNCTIONAL

## 2023-02-15 RX ORDER — LABETALOL HYDROCHLORIDE 5 MG/ML
INJECTION, SOLUTION INTRAVENOUS PRN
Status: DISCONTINUED | OUTPATIENT
Start: 2023-02-15 | End: 2023-02-15 | Stop reason: SDUPTHER

## 2023-02-15 RX ORDER — SODIUM CHLORIDE 9 MG/ML
INJECTION INTRAVENOUS
Status: DISPENSED
Start: 2023-02-15 | End: 2023-02-15

## 2023-02-15 RX ORDER — SODIUM CHLORIDE 0.9 % (FLUSH) 0.9 %
5-40 SYRINGE (ML) INJECTION PRN
Status: DISCONTINUED | OUTPATIENT
Start: 2023-02-15 | End: 2023-02-16 | Stop reason: HOSPADM

## 2023-02-15 RX ORDER — MORPHINE SULFATE 4 MG/ML
4 INJECTION, SOLUTION INTRAMUSCULAR; INTRAVENOUS
Status: DISCONTINUED | OUTPATIENT
Start: 2023-02-15 | End: 2023-02-16 | Stop reason: HOSPADM

## 2023-02-15 RX ORDER — SODIUM CHLORIDE 0.9 % (FLUSH) 0.9 %
5-40 SYRINGE (ML) INJECTION PRN
Status: DISCONTINUED | OUTPATIENT
Start: 2023-02-15 | End: 2023-02-15 | Stop reason: HOSPADM

## 2023-02-15 RX ORDER — ONDANSETRON 2 MG/ML
INJECTION INTRAMUSCULAR; INTRAVENOUS PRN
Status: DISCONTINUED | OUTPATIENT
Start: 2023-02-15 | End: 2023-02-15 | Stop reason: SDUPTHER

## 2023-02-15 RX ORDER — FENTANYL CITRATE 50 UG/ML
INJECTION, SOLUTION INTRAMUSCULAR; INTRAVENOUS PRN
Status: DISCONTINUED | OUTPATIENT
Start: 2023-02-15 | End: 2023-02-15 | Stop reason: SDUPTHER

## 2023-02-15 RX ORDER — NEOSTIGMINE METHYLSULFATE 1 MG/ML
INJECTION, SOLUTION INTRAVENOUS PRN
Status: DISCONTINUED | OUTPATIENT
Start: 2023-02-15 | End: 2023-02-15 | Stop reason: SDUPTHER

## 2023-02-15 RX ORDER — GLYCOPYRROLATE 0.2 MG/ML
INJECTION INTRAMUSCULAR; INTRAVENOUS PRN
Status: DISCONTINUED | OUTPATIENT
Start: 2023-02-15 | End: 2023-02-15 | Stop reason: SDUPTHER

## 2023-02-15 RX ORDER — ROPIVACAINE HYDROCHLORIDE 5 MG/ML
INJECTION, SOLUTION EPIDURAL; INFILTRATION; PERINEURAL
Status: COMPLETED | OUTPATIENT
Start: 2023-02-15 | End: 2023-02-15

## 2023-02-15 RX ORDER — SODIUM CHLORIDE 0.9 % (FLUSH) 0.9 %
5-40 SYRINGE (ML) INJECTION EVERY 12 HOURS SCHEDULED
Status: DISCONTINUED | OUTPATIENT
Start: 2023-02-15 | End: 2023-02-15 | Stop reason: HOSPADM

## 2023-02-15 RX ORDER — SODIUM CHLORIDE 0.9 % (FLUSH) 0.9 %
5-40 SYRINGE (ML) INJECTION EVERY 12 HOURS SCHEDULED
Status: DISCONTINUED | OUTPATIENT
Start: 2023-02-15 | End: 2023-02-16 | Stop reason: HOSPADM

## 2023-02-15 RX ORDER — MORPHINE SULFATE 2 MG/ML
2 INJECTION, SOLUTION INTRAMUSCULAR; INTRAVENOUS
Status: DISCONTINUED | OUTPATIENT
Start: 2023-02-15 | End: 2023-02-16 | Stop reason: HOSPADM

## 2023-02-15 RX ORDER — LIDOCAINE HYDROCHLORIDE 20 MG/ML
INJECTION, SOLUTION INTRAVENOUS PRN
Status: DISCONTINUED | OUTPATIENT
Start: 2023-02-15 | End: 2023-02-15 | Stop reason: SDUPTHER

## 2023-02-15 RX ORDER — SODIUM CHLORIDE, SODIUM LACTATE, POTASSIUM CHLORIDE, CALCIUM CHLORIDE 600; 310; 30; 20 MG/100ML; MG/100ML; MG/100ML; MG/100ML
INJECTION, SOLUTION INTRAVENOUS CONTINUOUS PRN
Status: DISCONTINUED | OUTPATIENT
Start: 2023-02-15 | End: 2023-02-15 | Stop reason: SDUPTHER

## 2023-02-15 RX ORDER — POLYETHYLENE GLYCOL 3350 17 G/17G
17 POWDER, FOR SOLUTION ORAL DAILY PRN
Status: DISCONTINUED | OUTPATIENT
Start: 2023-02-15 | End: 2023-02-16 | Stop reason: HOSPADM

## 2023-02-15 RX ORDER — PROPOFOL 10 MG/ML
INJECTION, EMULSION INTRAVENOUS PRN
Status: DISCONTINUED | OUTPATIENT
Start: 2023-02-15 | End: 2023-02-15 | Stop reason: SDUPTHER

## 2023-02-15 RX ORDER — ACETAMINOPHEN 325 MG/1
650 TABLET ORAL EVERY 6 HOURS
Status: DISCONTINUED | OUTPATIENT
Start: 2023-02-15 | End: 2023-02-16 | Stop reason: HOSPADM

## 2023-02-15 RX ORDER — MEPERIDINE HYDROCHLORIDE 25 MG/ML
12.5 INJECTION INTRAMUSCULAR; INTRAVENOUS; SUBCUTANEOUS
Status: DISCONTINUED | OUTPATIENT
Start: 2023-02-15 | End: 2023-02-15 | Stop reason: HOSPADM

## 2023-02-15 RX ORDER — ONDANSETRON 2 MG/ML
4 INJECTION INTRAMUSCULAR; INTRAVENOUS
Status: DISCONTINUED | OUTPATIENT
Start: 2023-02-15 | End: 2023-02-15 | Stop reason: HOSPADM

## 2023-02-15 RX ORDER — SODIUM CHLORIDE 9 MG/ML
INJECTION, SOLUTION INTRAVENOUS CONTINUOUS PRN
Status: DISCONTINUED | OUTPATIENT
Start: 2023-02-15 | End: 2023-02-15 | Stop reason: SDUPTHER

## 2023-02-15 RX ORDER — SODIUM CHLORIDE 9 MG/ML
INJECTION, SOLUTION INTRAVENOUS PRN
Status: DISCONTINUED | OUTPATIENT
Start: 2023-02-15 | End: 2023-02-15 | Stop reason: HOSPADM

## 2023-02-15 RX ORDER — DEXAMETHASONE SODIUM PHOSPHATE 10 MG/ML
INJECTION INTRAMUSCULAR; INTRAVENOUS PRN
Status: DISCONTINUED | OUTPATIENT
Start: 2023-02-15 | End: 2023-02-15 | Stop reason: SDUPTHER

## 2023-02-15 RX ORDER — ONDANSETRON 4 MG/1
4 TABLET, ORALLY DISINTEGRATING ORAL EVERY 8 HOURS PRN
Status: DISCONTINUED | OUTPATIENT
Start: 2023-02-15 | End: 2023-02-16 | Stop reason: HOSPADM

## 2023-02-15 RX ORDER — ROPIVACAINE HYDROCHLORIDE 5 MG/ML
60 INJECTION, SOLUTION EPIDURAL; INFILTRATION; PERINEURAL ONCE
Status: COMPLETED | OUTPATIENT
Start: 2023-02-15 | End: 2023-02-15

## 2023-02-15 RX ORDER — DEXAMETHASONE SODIUM PHOSPHATE 10 MG/ML
8 INJECTION, SOLUTION INTRAMUSCULAR; INTRAVENOUS ONCE
Status: COMPLETED | OUTPATIENT
Start: 2023-02-15 | End: 2023-02-15

## 2023-02-15 RX ORDER — VECURONIUM BROMIDE 1 MG/ML
INJECTION, POWDER, LYOPHILIZED, FOR SOLUTION INTRAVENOUS PRN
Status: DISCONTINUED | OUTPATIENT
Start: 2023-02-15 | End: 2023-02-15 | Stop reason: SDUPTHER

## 2023-02-15 RX ORDER — OXYCODONE HYDROCHLORIDE AND ACETAMINOPHEN 5; 325 MG/1; MG/1
1 TABLET ORAL EVERY 6 HOURS PRN
Qty: 28 TABLET | Refills: 0 | Status: SHIPPED | OUTPATIENT
Start: 2023-02-15 | End: 2023-02-22

## 2023-02-15 RX ORDER — MIDAZOLAM HYDROCHLORIDE 2 MG/2ML
1 INJECTION, SOLUTION INTRAMUSCULAR; INTRAVENOUS PRN
Status: DISCONTINUED | OUTPATIENT
Start: 2023-02-15 | End: 2023-02-15 | Stop reason: HOSPADM

## 2023-02-15 RX ORDER — ASPIRIN 81 MG/1
81 TABLET ORAL 2 TIMES DAILY
Status: DISCONTINUED | OUTPATIENT
Start: 2023-02-15 | End: 2023-02-16 | Stop reason: HOSPADM

## 2023-02-15 RX ORDER — SODIUM CHLORIDE 9 MG/ML
INJECTION, SOLUTION INTRAVENOUS PRN
Status: DISCONTINUED | OUTPATIENT
Start: 2023-02-15 | End: 2023-02-16 | Stop reason: HOSPADM

## 2023-02-15 RX ORDER — OXYCODONE HYDROCHLORIDE 10 MG/1
10 TABLET ORAL EVERY 4 HOURS PRN
Status: DISCONTINUED | OUTPATIENT
Start: 2023-02-15 | End: 2023-02-16 | Stop reason: HOSPADM

## 2023-02-15 RX ORDER — VANCOMYCIN HYDROCHLORIDE 1 G/20ML
INJECTION, POWDER, LYOPHILIZED, FOR SOLUTION INTRAVENOUS PRN
Status: DISCONTINUED | OUTPATIENT
Start: 2023-02-15 | End: 2023-02-15 | Stop reason: HOSPADM

## 2023-02-15 RX ORDER — ONDANSETRON 2 MG/ML
4 INJECTION INTRAMUSCULAR; INTRAVENOUS EVERY 6 HOURS PRN
Status: DISCONTINUED | OUTPATIENT
Start: 2023-02-15 | End: 2023-02-16 | Stop reason: HOSPADM

## 2023-02-15 RX ORDER — SODIUM CHLORIDE 9 MG/ML
INJECTION, SOLUTION INTRAVENOUS CONTINUOUS
Status: ACTIVE | OUTPATIENT
Start: 2023-02-15 | End: 2023-02-16

## 2023-02-15 RX ORDER — OXYCODONE HYDROCHLORIDE 5 MG/1
5 TABLET ORAL EVERY 4 HOURS PRN
Status: DISCONTINUED | OUTPATIENT
Start: 2023-02-15 | End: 2023-02-16 | Stop reason: HOSPADM

## 2023-02-15 RX ADMIN — LABETALOL HYDROCHLORIDE 5 MG: 5 INJECTION INTRAVENOUS at 08:59

## 2023-02-15 RX ADMIN — DEXAMETHASONE SODIUM PHOSPHATE 4 MG: 10 INJECTION INTRAMUSCULAR; INTRAVENOUS at 06:45

## 2023-02-15 RX ADMIN — ROPIVACAINE HYDROCHLORIDE 15 ML: 5 INJECTION, SOLUTION EPIDURAL; INFILTRATION; PERINEURAL at 06:39

## 2023-02-15 RX ADMIN — SODIUM CHLORIDE, POTASSIUM CHLORIDE, SODIUM LACTATE AND CALCIUM CHLORIDE: 600; 310; 30; 20 INJECTION, SOLUTION INTRAVENOUS at 08:27

## 2023-02-15 RX ADMIN — ACETAMINOPHEN 650 MG: 325 TABLET ORAL at 17:47

## 2023-02-15 RX ADMIN — FENTANYL CITRATE 50 MCG: 50 INJECTION, SOLUTION INTRAMUSCULAR; INTRAVENOUS at 08:13

## 2023-02-15 RX ADMIN — DEXAMETHASONE SODIUM PHOSPHATE 10 MG: 10 INJECTION INTRAMUSCULAR; INTRAVENOUS at 07:15

## 2023-02-15 RX ADMIN — LABETALOL HYDROCHLORIDE 5 MG: 5 INJECTION INTRAVENOUS at 09:22

## 2023-02-15 RX ADMIN — SODIUM CHLORIDE: 9 INJECTION, SOLUTION INTRAVENOUS at 05:48

## 2023-02-15 RX ADMIN — ROPIVACAINE HYDROCHLORIDE 15 ML: 5 INJECTION EPIDURAL; INFILTRATION; PERINEURAL at 06:46

## 2023-02-15 RX ADMIN — SODIUM CHLORIDE: 9 INJECTION, SOLUTION INTRAVENOUS at 06:37

## 2023-02-15 RX ADMIN — MIDAZOLAM 2 MG: 1 INJECTION INTRAMUSCULAR; INTRAVENOUS at 06:39

## 2023-02-15 RX ADMIN — LIDOCAINE HYDROCHLORIDE 80 MG: 20 INJECTION, SOLUTION INTRAVENOUS at 07:00

## 2023-02-15 RX ADMIN — ACETAMINOPHEN 650 MG: 325 TABLET ORAL at 12:56

## 2023-02-15 RX ADMIN — GLYCOPYRROLATE 0.2 MG: 1 INJECTION INTRAMUSCULAR; INTRAVENOUS at 07:31

## 2023-02-15 RX ADMIN — ONDANSETRON HYDROCHLORIDE 4 MG: 2 SOLUTION INTRAMUSCULAR; INTRAVENOUS at 08:55

## 2023-02-15 RX ADMIN — VECURONIUM BROMIDE 7 MG: 10 INJECTION, POWDER, LYOPHILIZED, FOR SOLUTION INTRAVENOUS at 07:00

## 2023-02-15 RX ADMIN — SODIUM CHLORIDE, PRESERVATIVE FREE 10 ML: 5 INJECTION INTRAVENOUS at 22:04

## 2023-02-15 RX ADMIN — SODIUM CHLORIDE: 9 INJECTION, SOLUTION INTRAVENOUS at 11:49

## 2023-02-15 RX ADMIN — BISACODYL 5 MG: 5 TABLET, COATED ORAL at 22:04

## 2023-02-15 RX ADMIN — FENTANYL CITRATE 50 MCG: 50 INJECTION, SOLUTION INTRAMUSCULAR; INTRAVENOUS at 07:37

## 2023-02-15 RX ADMIN — ROPIVACAINE HYDROCHLORIDE 30 ML: 5 INJECTION, SOLUTION EPIDURAL; INFILTRATION; PERINEURAL at 06:45

## 2023-02-15 RX ADMIN — ASPIRIN 81 MG: 81 TABLET, FILM COATED ORAL at 22:04

## 2023-02-15 RX ADMIN — LABETALOL HYDROCHLORIDE 5 MG: 5 INJECTION INTRAVENOUS at 08:29

## 2023-02-15 RX ADMIN — Medication 3 MG: at 09:41

## 2023-02-15 RX ADMIN — FENTANYL CITRATE 100 MCG: 50 INJECTION, SOLUTION INTRAMUSCULAR; INTRAVENOUS at 07:00

## 2023-02-15 RX ADMIN — PROPOFOL 160 MG: 10 INJECTION, EMULSION INTRAVENOUS at 07:00

## 2023-02-15 RX ADMIN — CEFAZOLIN 2000 MG: 2 INJECTION, POWDER, FOR SOLUTION INTRAMUSCULAR; INTRAVENOUS at 07:13

## 2023-02-15 RX ADMIN — FENTANYL CITRATE 50 MCG: 50 INJECTION, SOLUTION INTRAMUSCULAR; INTRAVENOUS at 09:00

## 2023-02-15 RX ADMIN — CEFAZOLIN 2000 MG: 2 INJECTION, POWDER, FOR SOLUTION INTRAMUSCULAR; INTRAVENOUS at 16:31

## 2023-02-15 RX ADMIN — GLYCOPYRROLATE 0.4 MG: 1 INJECTION INTRAMUSCULAR; INTRAVENOUS at 09:41

## 2023-02-15 RX ADMIN — MORPHINE SULFATE 4 MG: 4 INJECTION, SOLUTION INTRAMUSCULAR; INTRAVENOUS at 23:10

## 2023-02-15 ASSESSMENT — PAIN SCALES - GENERAL
PAINLEVEL_OUTOF10: 0
PAINLEVEL_OUTOF10: 0
PAINLEVEL_OUTOF10: 4
PAINLEVEL_OUTOF10: 0
PAINLEVEL_OUTOF10: 10

## 2023-02-15 ASSESSMENT — PAIN - FUNCTIONAL ASSESSMENT
PAIN_FUNCTIONAL_ASSESSMENT: 0-10
PAIN_FUNCTIONAL_ASSESSMENT: PREVENTS OR INTERFERES SOME ACTIVE ACTIVITIES AND ADLS

## 2023-02-15 ASSESSMENT — PAIN DESCRIPTION - LOCATION: LOCATION: LEG

## 2023-02-15 ASSESSMENT — PAIN DESCRIPTION - DESCRIPTORS: DESCRIPTORS: ACHING;DISCOMFORT;NAGGING

## 2023-02-15 ASSESSMENT — PAIN DESCRIPTION - ORIENTATION: ORIENTATION: RIGHT

## 2023-02-15 NOTE — DISCHARGE INSTRUCTIONS
[facility]) Department of Orthopedic Surgery  [address]     Dr. Nader Otero, DO         MD Dr. Param Gonzalez MD Craven Minder, PA-C Thersia Conner DelloStritto PA-C Jinnie Clutter PA-C    Orthopaedics Discharge Instructions   Non weight bearing right lower extremity  Contact Office for Medication Refill- [phone]  Office can refill pain med every 7 days  If patient discharging to facility then pain control will be continued per facility physician  Ice to operative/injured site for 15-30 minutes of each hour for next 5 days    Recommend that you continue to ice the area 2-3 times per day after this   Elevate operative/injured limb on 2 pillows at home  Goal is to have limb above the heart if able  Continue DVT Prophylaxis (blood clot prevention) as Prescribed  Post operative you were fit with a Hinged Range Of Motion Knee Brace. This is set 0-30 degrees, do not advance further until seen by orthopedics. This brace should be on at all times with the following exceptions: daily skin checks for irritation or breakdown, hygiene, wound care, therapy, to apply/adjust ACE wraps, and to apply ICE to the area of injury. The brace is adjustable with velcro straps for your comfort. You will be provided additional instructions on brace wear and adjustment of motion limitations at your initial post operative appointment. Please contact Ortho Trauma Office if having skin irritation/breakdown from brace. OK to remove Aquacel dressings on post op day seven. OK to shower on post op day 2 over Aquacel. No baths or lotions. Follow Up in Office in 2 weeks. Your first post op appointment is often with one of our PAs. Call the office at [phone] or directions or with any questions. Watch for these significant complications.   Call physician if they or any other problems occur:  Fever over 101°, redness, swelling or warmth at the operative site  Unrelieved nausea    Foul smelling or cloudy drainage at the operative site   Unrelieved pain    Blood soaked dressing. (Some oozing may be normal)     Numb, pale, blue, cold or tingling extremity  Future Appointments   Date Time Provider Bonifacio Keysi   3/6/2023  8:45 AM SCHEDULE, SE ORTHO APC [facility]   9/28/2023 10:15 AM Andrew Guerrero MD [facility]          It is the Department of Orthopaedic Trauma's standard of practice that providers will de-escalate(wean) all patients from narcotic(opioid) medications during the post-operative period. We provide multimodal pain control but opioid medications are tapered in all of our patients. If patient requires referral to pain management for prolonged taper off of opioid pain medication we will facilitate this process.

## 2023-02-15 NOTE — ANESTHESIA PRE PROCEDURE
Department of Anesthesiology  Preprocedure Note       Name:  Joana Angle   Age:  [de-identified] y.o.  :  1940                                          MRN:  [id number]         Date:  2/15/2023      Surgeon: Cecy De La Vega):  Jennifer Monge DO    Procedure: Procedure(s):  TIBIAL PLATEAU OPEN REDUCTION INTERNAL FIXATION, RIGHT, LEG EXTERNAL FIXATOR REMOVAL, RIGHT    Medications prior to admission:   Prior to Admission medications    Medication Sig Start Date End Date Taking? Authorizing Provider   Multiple Vitamin (MULTIVITAMIN ADULT PO) Take by mouth daily   Yes Historical Provider, MD   bisacodyl (DULCOLAX) 10 MG suppository Place 10 mg rectally daily as needed for Constipation Q24HR for constipation    Beula Husbands, MD   magnesium hydroxide (MILK OF MAGNESIA) 400 MG/5ML suspension Take by mouth daily as needed for Constipation    Beula Husbands, MD   oxyCODONE-acetaminophen (PERCOCET) 5-325 MG per tablet Take 1 tablet by mouth every 6 hours as needed for Pain for up to 7 days. Intended supply: 7 days.  Take lowest dose possible to manage pain Max Daily Amount: 4 tablets 23  Keny[place] PA-C   aspirin 325 MG EC tablet Take 1 tablet by mouth 2 times daily for 28 days 2/6/23 3/6/23  Claude Pouch, DO   Cholecalciferol (VITAMIN D) 2000 UNITS CAPS capsule Take by mouth daily    Historical Provider, MD   Ascorbic Acid (VITAMIN C) 500 MG CAPS Take 1,000 mg by mouth daily    Historical Provider, MD       Current medications:    Current Facility-Administered Medications   Medication Dose Route Frequency Provider Last Rate Last Admin    ropivacaine (NAROPIN) 0.5% injection 60 mL  60 mL Infiltration Once Girish Akbar, DO        midazolam (VERSED) PF injection 2 mg/2 mL  1 mg IntraVENous PRN Mell Green,         dexamethasone (DECADRON) (PF) 10 mg/mL injection  8 mg Other Once Girish Akbar, DO        sodium chloride flush 0.9 % injection 5-40 mL  5-40 mL IntraVENous 2 times per day GIANNA Mccrary        sodium chloride flush 0.9 % injection 5-40 mL  5-40 mL IntraVENous PRN GIANNA Mccrary        0.9 % sodium chloride infusion   IntraVENous PRN GIANNA Mccrary 25 mL/hr at 02/15/23 0548 New Bag at 02/15/23 0548    ceFAZolin (ANCEF) 2,000 mg in sterile water 20 mL IV syringe  2,000 mg IntraVENous On Call to [address]        sodium chloride (PF) 0.9 % injection                Allergies: Allergies   Allergen Reactions    Tetracyclines & Related Rash       Problem List:    Patient Active Problem List   Diagnosis Code    Tibial plateau fracture, right, closed, initial encounter S82.141A    Presence of other orthopedic joint implants F19.109       Past Medical History:        Diagnosis Date    Osteopenia        Past Surgical History:        Procedure Laterality Date    BREAST BIOPSY      JOINT REPLACEMENT Right 02/22/2010    Right total hip arthroplasty. Patria Mccrary. Michelle Lopez MD    JOINT REPLACEMENT Left 11/05/2004    Hemiarthroplasty of the left shoulder. Patria Mccrary. Michelle Lopez MD    LEG SURGERY Right 2/6/2023    TIBIAL PLATEAU OPEN REDUCTION INTERNAL FIXATION performed by Jet Pascual DO at [place] [address] Right 03/10/2009    Injection arthrogram right hip. Patria Mccrary. Michelle Lopez MD    THYROIDECTOMY, PARTIAL  1971    right left       Social History:    Social History     Tobacco Use    Smoking status: Never    Smokeless tobacco: Never   Substance Use Topics    Alcohol use:  No                                Counseling given: Not Answered      Vital Signs (Current):   Vitals:    02/15/23 0532   BP: (!) 175/76   Pulse: 85   Resp: 20   Temp: 97 °F (36.1 °C)   TempSrc: Temporal   SpO2: 96%   Weight: 124 lb (56.2 kg)   Height: 5' 5\" (1.651 m)                                              BP Readings from Last 3 Encounters:   02/15/23 (!) 175/76   02/08/23 (!) 162/82   09/23/22 (!) 142/80       NPO Status: Time of last liquid consumption: 2200> 8hrs                        Time of last solid consumption: 2200                        Date of last liquid consumption: 02/14/23                        Date of last solid food consumption: 02/14/23    BMI:   Wt Readings from Last 3 Encounters:   02/15/23 124 lb (56.2 kg)   02/05/23 124 lb (56.2 kg)   09/23/22 122 lb 1.6 oz (55.4 kg)     Body mass index is 20.63 kg/m². CBC:   Lab Results   Component Value Date/Time    WBC 5.5 02/09/2023 04:00 AM    RBC 3.85 02/09/2023 04:00 AM    HGB 11.4 02/09/2023 04:00 AM    HCT 34.4 02/09/2023 04:00 AM    MCV 89.4 02/09/2023 04:00 AM    RDW 13.2 02/09/2023 04:00 AM     02/09/2023 04:00 AM       CMP:   Lab Results   Component Value Date/Time     02/09/2023 04:00 AM    K 4.2 02/09/2023 04:00 AM    K 4.2 02/08/2023 04:31 AM     02/09/2023 04:00 AM    CO2 26 02/09/2023 04:00 AM    BUN 19 02/09/2023 04:00 AM    CREATININE 0.8 02/09/2023 04:00 AM    GFRAA >60 09/16/2022 10:12 AM    LABGLOM >60 02/09/2023 04:00 AM    GLUCOSE 109 02/09/2023 04:00 AM    PROT 6.2 02/09/2023 04:00 AM    CALCIUM 8.8 02/09/2023 04:00 AM    BILITOT 0.6 02/09/2023 04:00 AM    ALKPHOS 86 02/09/2023 04:00 AM    AST 13 02/09/2023 04:00 AM    ALT <5 02/09/2023 04:00 AM       POC Tests: No results for input(s): POCGLU, POCNA, POCK, POCCL, POCBUN, POCHEMO, POCHCT in the last 72 hours.     Coags:   Lab Results   Component Value Date/Time    PROTIME 10.9 02/15/2023 05:37 AM    INR 1.0 02/15/2023 05:37 AM    APTT 30.8 02/05/2023 04:46 PM       HCG (If Applicable): No results found for: PREGTESTUR, PREGSERUM, HCG, HCGQUANT     ABGs: No results found for: PHART, PO2ART, RES3PIR, BGL4IBQ, BEART, M5ZPYGKO     Type & Screen (If Applicable):  No results found for: LABABO, LABRH    Drug/Infectious Status (If Applicable):  No results found for: HIV, HEPCAB    COVID-19 Screening (If Applicable):   Lab Results   Component Value Date/Time    COVID19 Not Detected 02/09/2023 07:00 AM Anesthesia Evaluation  Patient summary reviewed and Nursing notes reviewed  Airway: Mallampati: III  TM distance: >3 FB   Neck ROM: full  Mouth opening: > = 3 FB   Dental: normal exam         Pulmonary:Negative Pulmonary ROS and normal exam  breath sounds clear to auscultation                             Cardiovascular:  Exercise tolerance: good (>4 METS),           Rhythm: regular  Rate: normal                    Neuro/Psych:   Negative Neuro/Psych ROS              GI/Hepatic/Renal: Neg GI/Hepatic/Renal ROS            Endo/Other:                      ROS comment: Tibial plateua fx Abdominal:             Vascular: negative vascular ROS. Other Findings:             Anesthesia Plan      general and regional     ASA 1       Induction: intravenous. MIPS: Postoperative opioids intended, Prophylactic antiemetics administered and Postoperative trial extubation. Anesthetic plan and risks discussed with patient. Plan discussed with CRNA.                     Gregory Faulkner, DO   2/15/2023

## 2023-02-15 NOTE — PROGRESS NOTES
[address]  [address] [place] [address]      Date:2/15/2023                 Patient Name: Aydin Santiago  MRN: [id number]  : 1940  Room: [address] [place] [address]A    Referring Provider: Lisseth Willis DO  Specific Provider Orders/Date: OT evaluation and treat 2/15/23    Evaluating OT: Bunnie Harada. Cecil OTR/L #[id number]    Diagnosis: Fracture of tibial plateau, closed, right, initial encounter [S82.141A]  Presence of other orthopedic joint implants [Z96.698]  Closed bicondylar fracture of right tibial plateau [I45.129H]      Surgery: s/p TIBIAL PLATEAU OPEN REDUCTION INTERNAL FIXATION, RIGHT, LEG EXTERNAL FIXATOR REMOVAL, RIGHT 2/15/23    Pertinent Medical History:  has a past medical history of Osteopenia.      Precautions:  Fall Risk, NWB to RLE , hinged knee brace     Assessment of current deficits   [x] Functional mobility  [x]ADLs  [] Strength               []Cognition   [x] Functional transfers   [x] IADLs         [x] Safety Awareness   [x]Endurance   [] Fine Coordination              [x] Balance      [] Vision/perception   []Sensation    []Gross Motor Coordination  [] ROM  [] Delirium                   [] Motor Control     OT PLAN OF CARE   OT POC based on physician orders, patient diagnosis and results of clinical assessment    Frequency/Duration   2-4 days/wk for 1 week PRN   Specific OT Treatment Interventions to include:   * Instruction/training on adapted ADL techniques and AE recommendations to increase functional independence within precautions       * Training on energy conservation strategies, correct breathing pattern and techniques to improve independence/tolerance for self-care routine  * Functional transfer/mobility training/DME recommendations for increased independence, safety, and fall prevention  * Patient/Family education to increase follow through with safety techniques and functional independence  * Recommendation of environmental modifications for increased safety with functional transfers/mobility and ADLs  * Sensory re-education to improve body/limb awareness, maintain/improve skin integrity, and improve hand/UE motor function  * Therapeutic activities to facilitate/challenge dynamic balance, stand tolerance for increased safety and independence with ADLs  * Therapeutic activities to facilitate gross/fine motor skills for increased independence with ADLs  * Positioning to improve skin integrity, interaction with environment and functional independence    Recommended Adaptive Equipment: TBD  BSC and ww for room    Home Living: From rehab- Pt lives alone  in a 2 story home with 3 steps and no3 hand rails. Bed and bath on main floor.   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   Medication management: self  Leisure: enjoys traveling    Pain Level: min RLE pain    Cognition: A&O: 4/4; Follows multi step directions   Memory:  good   Sequencing:  good-   Problem solving:  good   Judgement/safety:  good-     Functional Assessment:  AM-PAC Daily Activity Raw Score: 17/24   Initial Eval Status  Date: 2/15/23 Treatment Status  Date: STGs = LTGs  Time frame: 1-3 days   Feeding Independent     Grooming Setup sitting  Mod I seated   UB Dressing setup  Mod I    LB Dressing Max A  able to don L sock and pull up undergarment supine  Mod I with AE prn   Bathing Simulated mod A   Mod I seated with LHS   Toileting Min A seated on bed pan EOB able to perform hygiene and maintain NWB to RLE  Mod I with ww to elevated commode with HR   Bed Mobility  Supine to sit: min A   Sit to supine:  min A Supine to sit: mod I   Sit to supine: mod I    Functional Transfers Sit to stand min A to assist with RLE off loaded for NWB   Mod I with ww   Functional Mobility Side steps to HOB min a with ww  Mod I with ww   Balance Sitting:     Static:  good    Dynamic:SBA  Standing: min A                                                                        Activity Tolerance Good- O2 RA 96% HR 87  Good for ADL completion   Visual/  Perceptual            intact         Safety Good-                                  good     Hand Dominance R    AROM (PROM) Strength Additional Info:    RUE  WFL 4+/5 good  and wfl FMC/dexterity noted during ADL tasks       LUE WFL 4+/5 good  and wfl FMC/dexterity noted during ADL tasks     [facility]   Sensation:  decreased in R LE    Tone: WFL   Edema: mod RLE    Comments: Upon arrival patient supine and agreeable to evaluation. Performed OT evaluation with education on NWB to RLE and safety  with ADL completion and need for DME in bathroom. Toileting and walker safety, bed mobility and functional mobility performed. At end of session, patient supine with RLE elevated and  with call light and phone within reach, all lines and tubes intact. Nursing notified. Overall patient demonstrated  decreased independence and safety during completion of ADL/functional transfer/mobility tasks. Pt would benefit from continued skilled OT to increase safety and independence with completion of ADL/IADL tasks for functional independence and quality of life.     Treatment: OT treatment provided this date includes:   Instruction/training on safety and adapted techniques for completion of ADLs: to increase [place] in self care within precautions  with AE/DME prn   Functional transfer/mobility training/DME recommendations for increased  independence, safety, and fall prevention with  w   Instruction/training on energy conservation/work simplification for completion of ADLs: techniques to increase [place] with self care ADLs & iADLs, work simplification to improve endurance   Proper Positioning/Alignment: for optimal healing, skin integrity to prevent breakdown, decrease edema  Skilled monitoring of vitals: to include BP, spO2 & HR during session  Sitting/standing Balance/Tolerance- to increased balance & activity tolerance during ADLs as well as facilitate proper posture and/or positioning. Rehab Potential: Good  for established goals     Patient / Family Goal: back to [facility]      Patient and/or family were instructed on functional diagnosis, prognosis/goals and OT plan of care. Demonstrated good understanding. Eval Complexity: low    Time In: 14:45  Time Out: 15:10  Total Treatment Time: 10 min. Min Units   OT Eval Low 30765  x     OT Eval Medium 26767      OT Eval High 36730       OT Re-Eval [id number]       Therapeutic Ex 33282       Therapeutic Activities 51898  10  1   ADL/Self Care 35506       Orthotic Management 11457       Neuro Re-Ed 50416       Non-Billable Time          Evaluation Time includes thorough review of current medical information, gathering information on past medical history/social history and prior level of function, completion of standardized testing/informal observation of tasks, assessment of data and education on plan of care and goals. Simone Unger.  [address], [address]

## 2023-02-15 NOTE — ANESTHESIA PROCEDURE NOTES
Peripheral Block    Patient location during procedure: pre-op  Reason for block: post-op pain management and at surgeon's request  Start time: 2/15/2023 6:46 AM  End time: 2/15/2023 6:48 AM  Staffing  Performed: anesthesiologist   Anesthesiologist: Abdias Rm DO  Preanesthetic Checklist  Completed: patient identified, IV checked, site marked, risks and benefits discussed, surgical/procedural consents, equipment checked, pre-op evaluation, timeout performed, anesthesia consent given, oxygen available and monitors applied/VS acknowledged  Peripheral Block   Patient position: sitting  Prep: ChloraPrep  Provider prep: mask and sterile gloves  Patient monitoring: cardiac monitor, continuous pulse ox, frequent blood pressure checks and IV access  Block type: Femoral  Laterality: right  Injection technique: single-shot  Guidance: ultrasound guided  Local infiltration: lidocaine  Infiltration strength: 1 %  Local infiltration: lidocaine  Dose: 3 mL    Needle   Needle gauge: 21 G  Needle localization: ultrasound guidance  Needle length: 10 cm  Assessment   Injection assessment: negative aspiration for heme, no paresthesia on injection and local visualized surrounding nerve on ultrasound  Paresthesia pain: none  Slow fractionated injection: yes  Hemodynamics: stable  Real-time US image taken/store: yes  Outcomes: uncomplicated and patient tolerated procedure well    Additional Notes  U/S [id number].   Timeout performed          Medications Administered  ropivacaine (NAROPIN) injection 0.5% - Perineural   15 mL - 2/15/2023 6:46:00 AM

## 2023-02-15 NOTE — ANESTHESIA PROCEDURE NOTES
Peripheral Block    Patient location during procedure: pre-op  Reason for block: post-op pain management and at surgeon's request  Start time: 2/15/2023 6:39 AM  End time: 2/15/2023 6:41 AM  Staffing  Performed: anesthesiologist   Anesthesiologist: Suman Heard DO  Preanesthetic Checklist  Completed: patient identified, IV checked, site marked, risks and benefits discussed, surgical/procedural consents, equipment checked, pre-op evaluation, timeout performed, anesthesia consent given, oxygen available and monitors applied/VS acknowledged  Peripheral Block   Patient position: supine  Prep: ChloraPrep  Provider prep: mask and sterile gloves  Patient monitoring: cardiac monitor, continuous pulse ox, frequent blood pressure checks and IV access  Block type: Sciatic  Popliteal  Laterality: right  Injection technique: single-shot  Guidance: ultrasound guided  Local infiltration: lidocaine  Infiltration strength: 1 %  Local infiltration: lidocaine  Dose: 3 mL    Needle   Needle gauge: 21 G  Needle localization: ultrasound guidance  Needle length: 10 cm  Assessment   Injection assessment: negative aspiration for heme, no paresthesia on injection and local visualized surrounding nerve on ultrasound  Paresthesia pain: none  Slow fractionated injection: yes  Hemodynamics: stable  Real-time US image taken/store: yes  Outcomes: uncomplicated and patient tolerated procedure well    Additional Notes  U/S [id number]. Time out performed.          Medications Administered  ropivacaine (NAROPIN) injection 0.5% - Perineural   15 mL - 2/15/2023 6:39:00 AM

## 2023-02-15 NOTE — OP NOTE
Operative Note      Patient: Joana Tao  YOB: 1940  MRN: [id number]    Date of Procedure: 2/15/2023    Pre-Op Diagnosis: Fracture of tibial plateau, closed, right, initial encounter [S82.141A]  Presence of other orthopedic joint implants [Z96.698]    Post-Op Diagnosis: Same       Procedure(s):  1. Open reduction internal fixation bicondylar tibial plateau fracture with 2 incisions  2. Removal of knee spanning external fixator right bicondylar tibial plateau fracture    Surgeon(s):  Jennifer Monge DO    Assistant:   Resident: Yousuf Marie DO; Claude Pouch, DO; Haven Awad DO    Anesthesia: General    Estimated Blood Loss (mL): Minimal    Complications: None    Specimens:   * No specimens in log *    Implants:  Implant Name Type Inv.  Item Serial No.  Lot No. LRB No. Used Action   PLATE BNE [id number] 2 H ST POST MED PROX TIB S STL MABLE COMPR - [id number]  PLATE BNE [id number] 2 H ST POST MED PROX TIB S STL MABLE COMPR  DEPU[employer] [place] [id number] Right 1 Implanted   SCREW CORTX SLFTP FTHRD 3.5X44MM - [id number]  SCREW CORTX SLFTP FTHRD 3.5X44MM  [employer]  Right 1 Implanted   SCREW CORTX SLFTP FTHRD 3.5X42MM - [id number]  SCREW CORTX SLFTP FTHRD 3.5X42MM  DEPU[employer]  Right 1 Implanted   SCREW BNE L60MM DIA3.5MM HOLLI S STL ST NONCANNULATED MABLE - [id number]  SCREW BNE L60MM DIA3.5MM HOLLI S STL ST NONCANNULATED MABLE  DEPU[employer]  Right 1 Implanted   SCREW LK FTHRD SLFTP 3.5X44MM - [id number]  SCREW LK FTHRD SLFTP 3.5X44MM  DEPU[employer]  Right 2 Implanted   GRAFT BNE SUB 15ML 1.7-10MM CANC CHIP MORSELIZED FRZ DRY - [id number]  GRAFT BNE SUB 15ML 1.7-10MM CANC CHIP MORSELIZED FRZ DRY [id number] MUSCULOSKELETAL TRANSPLANT [facility]  Right 1 Implanted   SCREW BNE L36MM DIA3.5MM HOLLI S STL ST NONCANNULATED MABLE - [id number]  SCREW BNE L36MM DIA3.5MM HOLLI S STL ST NONCANNULATED MABLE  DE[employer]  Right 2 Implanted   SCREW BNE L60MM DIA3.5MM HOLLI S STL ST MABLE FULL THRD - [id number]  SCREW BNE L60MM DIA3.5MM HOLLI S STL ST MABLE FULL THRD  DE[employer]  Right 2 Implanted   SCREW LK SLFTP W/ 3.5X65MM - [id number]  SCREW LK SLFTP W/ 3.5X65MM  DEPU[employer] [place]-  Right 1 Implanted   SCREW LK FTHRD SLFTP 3.5X44MM - [id number]  SCREW LK FTHRD SLFTP 3.5X44MM  DEPU[employer]  Right 2 Implanted   SCREW BNE L56MM DIA3.5MM HOLLI S STL ST FIX ANG XENA MABLE - [id number]  SCREW BNE L56MM DIA3.5MM HOLLI S STL ST FIX ANG XENA MABLE  DE[employer]  Right 1 Implanted   PLATE BNE [id number] 6 H ST R PROX BILAT TIB S STL NEUT MABLE - [id number]  PLATE BNE [id number] 6 H ST R PROX BILAT TIB S STL NEUT MABLE  DE[employer] [place]-  Right 1 Implanted   SCREW BNE L54MM DIA3.5MM HOLLI S STL ST FIX ANG XENA MABLE - [id number]  SCREW BNE L54MM DIA3.5MM HOLLI S STL ST FIX ANG XENA MABLE  DEPU[employer] [place]-  Right 1 Implanted         Drains: * No LDAs found *    Findings: Bicondylar tibial plateau fracture with significant lateral joint depression and bone loss    Detailed Description of Procedure: The patient was seen and identified in the preoperative holding area the correct extremity was marked and agreed on by patient and staff the patient was then taken to the operative room transferred to the operative bed and sedated under the care of the anesthesia team.  She received a general anesthetic by the department of anesthesia as well as 2 g of Ancef intravenously. Next, a tourniquet was placed high on the right lower extremity. Surgical timeout was then performed per protocol by members of surgical team.  The knee spanning external fixator was then prepped with Betadine external bars and clamps were removed, we did maintain one of the Schanz pins in the tibia for traction. Next, the extremity was prepped and draped in normal sterile fashion. Incision was marked out over the medial and lateral tibial plateau.   The leg was exsanguinated with an Esmarch bandage and the tourniquet was inflated to 250 mmHg. Next, an incision was made through the medial and incision and dissection was taken down to the fascia. Fascia was incised and the hamstring tendons were identified and freed up and retracted. Next, a vertical incision was made off the posterior border of the MCL allowing for visualization of the posterior medial fracture fragment. 2 joystick wires were inserted into the fragment and used to elevate the fracture and restore the joint line. The fragment was pinned into place using 2 separate K wires. ,  A 3 hole Synthes posterior medial locking plate was chosen. First a cortical screw was inserted just distal to the fracture compressing the plate to the bone and further compressing the fracture at the joint line. Then, a lag screw was inserted across the fracture further compressing the joint line. 2 more locking screws were inserted into the proximal fragment. Then inserted 2 more screws distal into the shaft of the tibia. We then turned our attention to the lateral tibial plateau. A incision was made on the lateral side of the tibia. Next, blunt dissection was taken down to the fascia of the anterior compartment. The fascia was incised and the anterior compartment was dissected posteriorly off the tibia. A submeniscal arthrotomy was made in the knee to visualize the lateral joint. The lateral split was open booked revealing the depressed pieces of the joint. The lateral joint was significantly comminuted and several pieces of cartilage were nonviable. There was osteochondral fragments of bone completely impacted down within the tibial metaphysis. The nonviable tissue was removed and discarded. The joint was then rebuilt using several K wires. Next, the joint was backfilled using 15 cc of cancellous chips. We then placed the lateral cortical piece back into place and pinned it into place.   We then placed a 6-hole lateral proximal tibial locking plate into position and used the periarticular clamp to compress the joint. A cortical screw was placed into the distal shaft further compressing the joint. Next a lag screw was placed across the proximal joint through the proximal locking hole further compressing the joint. We then filled all 4 proximal locking holes as well as well as a kickstand screw grafting of the joint nicely. Next 2 more screws were placed distally into the shaft of the tibia. Final fluoroscopic x-rays were obtained showing good reduction of the joint good mechanical alignment and good placement of all hardware. The submeniscal arthrotomy was then closed using #2 FiberWire. The wounds were copiously irrigated with normal saline the fascia was closed over the plate medially and laterally. 1 g of vancomycin powder was placed into the wound. The subcutaneous tissue was closed with 2-0 Monocryl followed by 3-0 nylon for the skin. The patient was then placed into a sterile dressing and a knee immobilizer. Patient was then awakened from anesthesia transferred back to her hospital bed and taken to the PACU in stable condition. Postoperative plan    Patient will recover in the PACU she will then be transferred to the floor and we will consult  for discharge planning. She will be nonweightbearing to the right lower extremity. We will order hinged ROM brace. She will follow-up in the office in 2 weeks for repeat x-rays and suture removal.    Should be noted that Dr. Roxanne Vizcarra was present for the entirety of the procedure. Electronically signed by Jackelyn Barraza DO on 2/15/2023     Orthopaedic Attending    I was present and scrubbed throughout the entire procedure and involved with all aspects of patient surgical care. Electronically Signed By    Domonique Ahuja D.O.  2/15/2023    NOTE: This report was transcribed using voice recognition software.  Every effort was made to ensure accuracy; however, inadvertent computerized transcription errors may be present

## 2023-02-15 NOTE — ANESTHESIA POSTPROCEDURE EVALUATION
Department of Anesthesiology  Postprocedure Note    Patient: Kira Raymundo  MRN: [id number]  YOB: 1940  Date of evaluation: 2/15/2023      Procedure Summary     Date: 02/15/23 Room / Location: [facility] [place] OR  / [facility]    Anesthesia Start: 4184 Anesthesia Stop: 3216    Procedure: TIBIAL PLATEAU OPEN REDUCTION INTERNAL FIXATION, RIGHT, LEG EXTERNAL FIXATOR REMOVAL, RIGHT (Right) Diagnosis:       Fracture of tibial plateau, closed, right, initial encounter      Presence of other orthopedic joint implants      (Fracture of tibial plateau, closed, right, initial encounter [S82.141A])      (Presence of other orthopedic joint implants [C16.429])    Surgeons: Patrick Jordan DO Responsible Provider: Elly Costa MD    Anesthesia Type: general, regional ASA Status: 1          Anesthesia Type: No value filed.     Arlen Phase I: Arlen Score: 8    Arlen Phase II:        Anesthesia Post Evaluation    Patient location during evaluation: PACU  Patient participation: complete - patient participated  Level of consciousness: awake  Pain score: 0  Airway patency: patent  Nausea & Vomiting: no nausea  Complications: no  Cardiovascular status: hemodynamically stable  Respiratory status: acceptable  Hydration status: stable  Multimodal analgesia pain management approach

## 2023-02-15 NOTE — INTERVAL H&P NOTE
Update History & Physical    The patient's History and Physical of February 14, 2023 was reviewed with the patient and I examined the patient. There was no change. The surgical site was confirmed by the patient and me. Plan: Right tibia plateau ORIF, possible bone allograft, soft tissue repairs as indicated, removal knee spanning external fixator    I have explained the risks and complications of the recommended surgery with the patient at length, as well as discussed potential treatment alternatives including nonoperative management. These risks include but are not limited to death or complication from anesthesia, continued pain, nerve tendon or vascular injury, infection, nonunion or malunion, symptomatic hardware or hardware failure, deep vein thrombosis or pulmonary embolism, knee pain, post traumatic arthritis ,stiffness/arthrofibrosis, unforeseen complications, and need for further surgery, etc.  Patient understood this, asked appropriate questions, which were all answered, and she has elected to proceed with the procedure.     Electronically signed by Karla Gan DO on 2/15/2023 at 6:43 AM

## 2023-02-16 VITALS
TEMPERATURE: 96.9 F | HEIGHT: 65 IN | BODY MASS INDEX: 20.66 KG/M2 | HEART RATE: 79 BPM | OXYGEN SATURATION: 95 % | RESPIRATION RATE: 16 BRPM | SYSTOLIC BLOOD PRESSURE: 158 MMHG | DIASTOLIC BLOOD PRESSURE: 69 MMHG | WEIGHT: 124 LBS

## 2023-02-16 LAB
ANION GAP SERPL CALCULATED.3IONS-SCNC: 10 MMOL/L (ref 7–16)
BUN BLDV-MCNC: 21 MG/DL (ref 6–23)
CALCIUM SERPL-MCNC: 8.8 MG/DL (ref 8.6–10.2)
CHLORIDE BLD-SCNC: 105 MMOL/L (ref 98–107)
CO2: 25 MMOL/L (ref 22–29)
CREAT SERPL-MCNC: 0.8 MG/DL (ref 0.5–1)
GFR SERPL CREATININE-BSD FRML MDRD: >60 ML/MIN/1.73
GLUCOSE BLD-MCNC: 114 MG/DL (ref 74–99)
HCT VFR BLD CALC: 29 % (ref 34–48)
HEMOGLOBIN: 9.6 G/DL (ref 11.5–15.5)
MCH RBC QN AUTO: 29.4 PG (ref 26–35)
MCHC RBC AUTO-ENTMCNC: 33.1 % (ref 32–34.5)
MCV RBC AUTO: 89 FL (ref 80–99.9)
PDW BLD-RTO: 13.5 FL (ref 11.5–15)
PLATELET # BLD: 406 E9/L (ref 130–450)
PMV BLD AUTO: 9.7 FL (ref 7–12)
POTASSIUM REFLEX MAGNESIUM: 3.9 MMOL/L (ref 3.5–5)
RBC # BLD: 3.26 E12/L (ref 3.5–5.5)
SARS-COV-2: NOT DETECTED
SODIUM BLD-SCNC: 140 MMOL/L (ref 132–146)
SOURCE: NORMAL
WBC # BLD: 8 E9/L (ref 4.5–11.5)

## 2023-02-16 PROCEDURE — 97530 THERAPEUTIC ACTIVITIES: CPT

## 2023-02-16 PROCEDURE — 85027 COMPLETE CBC AUTOMATED: CPT

## 2023-02-16 PROCEDURE — 6360000002 HC RX W HCPCS: Performed by: STUDENT IN AN ORGANIZED HEALTH CARE EDUCATION/TRAINING PROGRAM

## 2023-02-16 PROCEDURE — 80048 BASIC METABOLIC PNL TOTAL CA: CPT

## 2023-02-16 PROCEDURE — 2580000003 HC RX 258: Performed by: STUDENT IN AN ORGANIZED HEALTH CARE EDUCATION/TRAINING PROGRAM

## 2023-02-16 PROCEDURE — 97161 PT EVAL LOW COMPLEX 20 MIN: CPT

## 2023-02-16 PROCEDURE — 6370000000 HC RX 637 (ALT 250 FOR IP): Performed by: STUDENT IN AN ORGANIZED HEALTH CARE EDUCATION/TRAINING PROGRAM

## 2023-02-16 PROCEDURE — L1832 KO ADJ JNT POS R SUP PRE CST: HCPCS

## 2023-02-16 PROCEDURE — 97535 SELF CARE MNGMENT TRAINING: CPT

## 2023-02-16 PROCEDURE — 36415 COLL VENOUS BLD VENIPUNCTURE: CPT

## 2023-02-16 RX ADMIN — OXYCODONE HYDROCHLORIDE 10 MG: 10 TABLET ORAL at 01:43

## 2023-02-16 RX ADMIN — OXYCODONE 5 MG: 5 TABLET ORAL at 15:24

## 2023-02-16 RX ADMIN — OXYCODONE HYDROCHLORIDE 10 MG: 10 TABLET ORAL at 05:43

## 2023-02-16 RX ADMIN — SODIUM CHLORIDE, PRESERVATIVE FREE 10 ML: 5 INJECTION INTRAVENOUS at 09:34

## 2023-02-16 RX ADMIN — ACETAMINOPHEN 650 MG: 325 TABLET ORAL at 05:46

## 2023-02-16 RX ADMIN — ACETAMINOPHEN 650 MG: 325 TABLET ORAL at 12:23

## 2023-02-16 RX ADMIN — ACETAMINOPHEN 650 MG: 325 TABLET ORAL at 00:10

## 2023-02-16 RX ADMIN — ASPIRIN 81 MG: 81 TABLET, FILM COATED ORAL at 09:27

## 2023-02-16 RX ADMIN — CEFAZOLIN 2000 MG: 2 INJECTION, POWDER, FOR SOLUTION INTRAMUSCULAR; INTRAVENOUS at 00:10

## 2023-02-16 RX ADMIN — SODIUM CHLORIDE, PRESERVATIVE FREE 10 ML: 5 INJECTION INTRAVENOUS at 00:10

## 2023-02-16 RX ADMIN — BISACODYL 5 MG: 5 TABLET, COATED ORAL at 09:28

## 2023-02-16 ASSESSMENT — PAIN DESCRIPTION - LOCATION
LOCATION: LEG

## 2023-02-16 ASSESSMENT — PAIN DESCRIPTION - PAIN TYPE: TYPE: SURGICAL PAIN

## 2023-02-16 ASSESSMENT — PAIN SCALES - GENERAL
PAINLEVEL_OUTOF10: 3
PAINLEVEL_OUTOF10: 5
PAINLEVEL_OUTOF10: 6
PAINLEVEL_OUTOF10: 8
PAINLEVEL_OUTOF10: 4
PAINLEVEL_OUTOF10: 9

## 2023-02-16 ASSESSMENT — PAIN DESCRIPTION - ONSET: ONSET: ON-GOING

## 2023-02-16 ASSESSMENT — PAIN DESCRIPTION - DESCRIPTORS
DESCRIPTORS: ACHING;DISCOMFORT;NAGGING

## 2023-02-16 ASSESSMENT — PAIN DESCRIPTION - FREQUENCY: FREQUENCY: CONTINUOUS

## 2023-02-16 ASSESSMENT — PAIN DESCRIPTION - ORIENTATION
ORIENTATION: RIGHT

## 2023-02-16 ASSESSMENT — PAIN - FUNCTIONAL ASSESSMENT
PAIN_FUNCTIONAL_ASSESSMENT: PREVENTS OR INTERFERES SOME ACTIVE ACTIVITIES AND ADLS

## 2023-02-16 NOTE — DISCHARGE INSTR - COC
Continuity of Care Form    Patient Name: Arina Aceves   :  1940  MRN:  [id number]    Admit date:  2/15/2023  Discharge date:  23    Code Status Order: Prior   Advance Directives:   [facility] Documentation       Date/Time Healthcare Directive Type of Healthcare Directive Copy in [facility] [address] Agent's Name Healthcare Agent's Phone Number    02/15/23 5974 Yes, patient has an advance directive for healthcare treatment -- -- -- -- --            Admitting Physician:  Zac Rios DO  PCP: Dane Cuevas MD    Discharging Nurse: [facility] Unit/Room#: 3819/5283-I  Discharging Unit Phone Number: [phone]    Emergency Contact:   Extended Emergency Contact Information  Primary Emergency Contact: Earnest Beltrán [address] Phone: [phone]  Work Phone: [phone]  Mobile Phone: [phone]  Relation: Child    Past Surgical History:  Past Surgical History:   Procedure Laterality Date    BREAST BIOPSY      JOINT REPLACEMENT Right 2010    Right total hip arthroplasty. Harrison Baltazar. Hanna Mckeon MD    JOINT REPLACEMENT Left 2004    Hemiarthroplasty of the left shoulder. Harrison Baltazar. MD Bryce    LEG SURGERY Right 2023    TIBIAL PLATEAU OPEN REDUCTION INTERNAL FIXATION performed by Zac Rios DO at [facility] Right 2/15/2023    TIBIAL PLATEAU OPEN REDUCTION INTERNAL FIXATION, RIGHT, LEG EXTERNAL FIXATOR REMOVAL, RIGHT performed by Zac Rios DO at [facility] Right 03/10/2009    Injection arthrogram right hip. Harrison Wu MD    THYROIDECTOMY, PARTIAL  1971    right left       Immunization History: There is no immunization history on file for this patient.     Active Problems:  Patient Active Problem List   Diagnosis Code    Tibial plateau fracture, right, closed, initial encounter S82.141A    Presence of other orthopedic joint implants Z96.698    Closed bicondylar fracture of right tibial plateau B40.905M       Isolation/Infection:   Isolation            No Isolation          Patient Infection Status       Infection Onset Added Last Indicated Last Indicated By Review Planned Expiration Resolved Resolved By    None active    Resolved    COVID-19 (Rule Out) 12/02/20 12/02/20 12/02/20 COVID-19 Ambulatory (Ordered)   12/03/20 Rule-Out Test Resulted            Nurse Assessment:  Last Vital Signs: BP (!) 158/69   Pulse 79   Temp 96.9 °F (36.1 °C) (Temporal)   Resp 16   Ht 5' 5\" (1.651 m)   Wt 124 lb (56.2 kg)   SpO2 95%   BMI 20.63 kg/m²     Last documented pain score (0-10 scale): Pain Level: 8  Last Weight:   Wt Readings from Last 1 Encounters:   02/15/23 124 lb (56.2 kg)     Mental Status:  oriented and alert    IV Access:  - None    Nursing Mobility/ADLs:  Walking   Assisted  Transfer  Assisted  Bathing  Assisted  Dressing  Assisted  Toileting  Assisted  Feeding  Independent  Med Admin  Assisted  Med Delivery   whole    Wound Care Documentation and Therapy:  Fixation Device 02/06/23 Right Lower Extremity (Active)   Number of days: 9       Incision 02/06/23 Pretibial Proximal;Right (Active)   Number of days: 9       Incision 02/15/23 Knee Anterior;Right;Lateral (Active)   Dressing Status Clean;Dry; Intact 02/15/23 2215   Dressing/Treatment Ace wrap 02/15/23 2215   Closure Sutures 02/15/23 0930   Drainage Amount None 02/15/23 2215   Number of days: 1       Incision 02/15/23 Knee Anterior;Right;Medial (Active)   Dressing Status Clean;Dry; Intact 02/15/23 2215   Dressing/Treatment Ace wrap 02/15/23 2215   Closure Sutures 02/15/23 0950   Drainage Amount None 02/15/23 2215   Number of days: 1        Elimination:  Continence:    Bowel: Yes  Bladder: Yes  Urinary Catheter: None   Colostomy/Ileostomy/Ileal Conduit: No       Date of Last BM: prior to surgery    Intake/Output Summary (Last 24 hours) at 2/16/2023 0940  Last data filed at 2/16/2023 0934  Gross per 24 hour   Intake 1030 ml   Output --   Net 1030 ml     I/O last 3 completed shifts: In: 2020 [P.O.:420; I.V.:1600]  Out: 150 [Blood:150]    Safety Concerns:     History of Falls (last 30 days) and At Risk for Falls    Impairments/Disabilities:      None    Nutrition Therapy:  Current Nutrition Therapy:   - Oral Diet:  General    Routes of Feeding: Oral  Liquids: Thin Liquids  Daily Fluid Restriction: no  Last Modified Barium Swallow with Video (Video Swallowing Test): not done    Treatments at the Time of Hospital Discharge:   Respiratory Treatments: na  Oxygen Therapy:  is not on home oxygen therapy.   Ventilator:    - No ventilator support    Rehab Therapies: Physical Therapy and Occupational Therapy  Weight Bearing Status/Restrictions: Non-weight bearing on right leg  Other Medical Equipment (for information only, NOT a DME order):  wheelchair and walker  Other Treatments: na    Patient's personal belongings (please select all that are sent with patient):  None    RN SIGNATURE:  Electronically signed by Tran Thornton RN on 2/16/23 at 1:14 PM EST    CASE MANAGEMENT/SOCIAL WORK SECTION    Inpatient Status Date: ***    Readmission Risk Assessment Score:  Readmission Risk              Risk of Unplanned Readmission:  10           Discharging to Facility/ Agency   Name: [facility]  Address:  Phone:  Fax:    Dialysis Facility (if applicable)   Name:  Address:  Dialysis Schedule:  Phone:  Fax:    / signature: Electronically signed by ABHINAV King on 2/16/23 at 9:40 AM EST    PHYSICIAN SECTION    Prognosis: {Prognosis:0664184765}    Condition at Discharge: [facility] Patient Condition:[id number]}    Rehab Potential (if transferring to Rehab): {Prognosis:0120343887}    Recommended Labs or Other Treatments After Discharge: ***    Physician Certification: I certify the above information and transfer of Cortez Chacho  is necessary for the continuing treatment of the diagnosis listed and that she requires [place] for less 30 days.      Update Admission H&P: {CHP DME Changes in [facility]:104820890}    PHYSICIAN SIGNATURE:  {Esignature:[id number]}

## 2023-02-16 NOTE — PROGRESS NOTES
OCCUPATIONAL THERAPY TREATMENT NOTE     [facility]      OT BEDSIDE TREATMENT NOTE      Date:2023  Patient Name: Sneha Thomas  MRN: [id number]  : 1940  Room: [address] [place] [address]-A       Referring Provider: Daren Denis DO  Specific Provider Orders/Date: OT evaluation and treat 2/15/23     Evaluating OT: Zenobia Dawkins. Cecil, OTR/L #[id number]     Diagnosis: Fracture of tibial plateau, closed, right, initial encounter [S82.141A]  Presence of other orthopedic joint implants [Z96.698]  Closed bicondylar fracture of right tibial plateau [B99.151B]       Surgery: s/p TIBIAL PLATEAU OPEN REDUCTION INTERNAL FIXATION, RIGHT, LEG EXTERNAL FIXATOR REMOVAL, RIGHT 2/15/23     Pertinent Medical History:  has a past medical history of Osteopenia.       Precautions:  Fall Risk, NWB to RLE , hinged knee brace locked 0-30     Assessment of current deficits   [x] Functional mobility             [x]ADLs           [] Strength                  []Cognition   [x] Functional transfers           [x] IADLs         [x] Safety Awareness   [x]Endurance   [] Fine Coordination              [x] Balance      [] Vision/perception   []Sensation     []Gross Motor Coordination  [] ROM           [] Delirium                   [] Motor Control      OT PLAN OF CARE   OT POC based on physician orders, patient diagnosis and results of clinical assessment     Frequency/Duration   2-4 days/wk for 1 week PRN   Specific OT Treatment Interventions to include:   * Instruction/training on adapted ADL techniques and AE recommendations to increase functional independence within precautions       * Training on energy conservation strategies, correct breathing pattern and techniques to improve independence/tolerance for self-care routine  * Functional transfer/mobility training/DME recommendations for increased independence, safety, and fall prevention  * Patient/Family education to increase follow through with safety techniques and functional independence  * Recommendation of environmental modifications for increased safety with functional transfers/mobility and ADLs  * Sensory re-education to improve body/limb awareness, maintain/improve skin integrity, and improve hand/UE motor function  * Therapeutic activities to facilitate/challenge dynamic balance, stand tolerance for increased safety and independence with ADLs  * Therapeutic activities to facilitate gross/fine motor skills for increased independence with ADLs  * Positioning to improve skin integrity, interaction with environment and functional independence     Recommended Adaptive Equipment: TBD  BSC and ww for room     Home Living: From rehab- Pt lives alone  in a 2 story home with 3 steps and no3 hand rails. Bed and bath on main floor. 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 with breakfast tray      Grooming Setup sitting  set up seated in   chair Mod I seated   UB Dressing setup  set up with  robe seated EOB Mod I    LB Dressing Max A  able to don L sock and pull up undergarment supine  Mod A for R socks & brace adjustment.   Able to caitlin L sock long-sitting in bed Mod I with AE prn   Bathing Simulated mod A Mod  A with sim. task Mod I seated with LHS   Toileting Min A seated on bed pan EOB able to perform hygiene and maintain NWB to RLE  Mod A with bedpan use seated in chair Mod I with ww to elevated commode with HR   Bed Mobility  Supine to sit: min A   Sit to supine:  min A  Sup > Sit: Min A for R LE  Sit > Sup: NT  Supine to sit: mod I   Sit to supine: mod I    Functional Transfers Sit to stand min A to assist with RLE off loaded for NWB   Min A with sit <> stand  Mod A with SPT using ww Mod I with ww   Functional Mobility Side steps to HOB min a with ww  Mod A with ww, able to maintain NWB R LE, few steps EOB > chair Mod I with ww   Balance Sitting:     Static:  good    Dynamic:SBA  Standing: min A  Sitting:     Static:  good    Dynamic:SBA  Standing: min A                                                                        Activity Tolerance Good- O2 RA 96% HR 87 spO2 on RA 97%  HR 85 bpm Good for ADL completion   Visual/  Perceptual            intact     G      Safety Good-                       G                                 good      Comments: Upon arrival pt supine in bed, agreeable to OT, cleared by RN & Ortho. Therapist facilitated bed mobility/ADLs/functional transfers/mobility training with focus on safety, technique & precautions. Pt. Instructed RE: safe transfers/mobility, ADLs, role of OT, treatment plan, recs. , prec. At end of session seated with R LE elevated & ice applied, all needs met, tray table within reach, RN notified, all lines and tubes intact, call light within reach. Pt has made G progress towards set goals.    Continue with current plan of care      Treatment Time In:8:40            Treatment Time Out: 9:05                Treatment Charges: Mins Units   Ther Ex  79122     Manual Therapy 92882     Thera Activities 40349 10 1   ADL/Home Mgt 49984 15 1   Neuro Re-ed 67790     Group Therapy      Orthotic manage/training  41484     Non-Billable Time     Total Timed Treatment 25 2 [address], OTR/L   License #  [id number]

## 2023-02-16 NOTE — PROGRESS NOTES
Department of Orthopedic Surgery   Progress Note    Patient seen and examined. Block still active. Pain controlled. No acute overnight event. Denies chest pain, shortness of breath, calf pain, dizziness/lightheadedness, fevers or chills. VITALS:  /65   Pulse 73   Temp 97.7 °F (36.5 °C) (Temporal)   Resp 16   Ht 5' 5\" (1.651 m)   Wt 124 lb (56.2 kg)   SpO2 96%   BMI 20.63 kg/m²     GENERAL: In bed, comfortable  MUSCULOSKELETAL:   right lower extremity:  Dressing C/D/I  Knee immobilizer in place  Compartments soft and compressible, calf non-tender  Palpable dorsalis pedis and posterior tibialis pulse, brisk cap refill to toes, foot warm and perfused  Review Homans' sign,  Subjective Sensation  to light touch in sural/deep peroneal/superficial peroneal/saphenous/posterior tibial nerve distributions to foot/ankle. Demonstrates active ankle plantar/dorsiflexion/great toe extension    CBC:   Lab Results   Component Value Date/Time    WBC 5.5 02/09/2023 04:00 AM    HGB 11.4 02/09/2023 04:00 AM    HCT 34.4 02/09/2023 04:00 AM     02/09/2023 04:00 AM       ASSESSMENT  S/p right bicondylar tibial plateau ORIF 2/42    PLAN    Continue physical therapy and protocol: Strict nonweightbearing to right lower extremity. Fall precautions. Maintain hinged ROM knee brace  24 hour abx coverage in the form of Ancef to be completed today  Deep venous thrombosis prophylaxis -aspirin 81 mg-scheduled to restart today, early mobilization  Pain Control: IV and PO -continue pain assessment as peripheral block weans off  D/C Plan:  pending sw/cm and therapy recommendations. Discuss with attending     electronically signed by Randa Zelaya DO, on 2/16/2023 at 5:25 AM     Orthopaedic Attending    I have seen and evaluated the patient with the resident and agree with the above assessments on today's visit.  I have performed the key components of the history and physical examination and concur completely with the findings and plans as documented above.     DVT ppx  Pain control  D/c planning  PT - NWB RLE, hinged ROM brace 0-30    Electronically signed by   Constance Barros DO  2/16/2023

## 2023-02-16 NOTE — PROGRESS NOTES
Physical Therapy  Physical Therapy Initial Evaluation    Name: Stew Cruz  : 1940  MRN: [id number]      Date of Service: 2023    Evaluating PT:  Suzanne Austin, PT [id number]    Referring provider/PT Order:  PT Eval and Treat   02/15/23 1145  PT eval and treat        Jorie Snellen, DO   Room #:  0272/2096-I  Diagnosis:  Fracture of tibial plateau, closed, right, initial encounter [S82.141A]  Presence of other orthopedic joint implants [Z96.698]  Closed bicondylar fracture of right tibial plateau [J46.951B]  PMHx/PSHx:     has a past medical history of Osteopenia. has a past surgical history that includes joint replacement (Right, 2010); joint replacement (Left, 2004); Thyroidectomy, partial (); other surgical history (Right, 03/10/2009); Breast biopsy; Leg Surgery (Right, 2023); and Leg Surgery (Right, 2/15/2023). Procedure/Surgery:  S/p right bicondylar tibial plateau ORIF   Precautions:  Falls,  NWB (non-weight bearing) RLE, KI transition to ROM brace 0 to 30 degrees  Equipment Needs: To be determined,    SUBJECTIVE:    Patient admitted from rehab center. Prior to injury. Patient lives alone  in a two story home resides first  with 3 steps to enter without Rail  Bed is on 1 floor and bath is on 1 floor. Patient ambulated independently  PTA. Equipment owned: Peter Atwood   OBJECTIVE:   Initial Evaluation  Date: 23 Treatment Short Term/ Long Term   Goals   AM-PAC 6 Clicks      Was pt agreeable to Eval/treatment? yes     Does pt have pain? yes     Bed Mobility  Rolling: Min  Supine to sit:   Min   Sit to supine: Min   Scooting: Min   Rolling: Ind  Supine to sit: Ind  Sit to supine: Ind  Scooting: Ind   Transfers Sit to stand: Min to fww  Stand to sit: Min  Stand pivot: Min with fww  Sit to stand: Mod Ind  to fww  Stand to sit: Mod Ind    Stand pivot: Mod Ind  with fww   Ambulation    5 feet with fww Mod   25 feet with Mod Ind  maintain NWB RLE.     Stair negotiation: ascended and descended  NT       Strength/ROM:   See OT BUE  RLE grossly NT  LLE grossly 5/5  RLE AROM 0-30 degree brace. LLE AROM WFL    Balance:     Static Sitting: Ind  Dynamic Sitting: Ind  Static Standing: Min with fww  Dynamic Standing: Min with fww      Patient is Alert & Oriented x person, place, time, and situation and follows directions   Sensation:  Pt denies numbness and tingling to extremities  Edema:  none  Therapeutic Exercises:  Functional activity with fww for support. Cues for sequencing. Patient education  Pt educated on role of Physical Therapy, risks of immobility, safety and plan of care,  importance of mobility while in hospital , safety , and weight bearing status  and use of call light for safety. Patient response to education:   Pt verbalized understanding Pt demonstrated skill Pt requires further education in this area   yes yes Reminders     ASSESSMENT:    Conditions Requiring Skilled Therapeutic Intervention:    [x]Decreased strength     [x]Decreased ROM  [x]Decreased functional mobility  [x]Decreased balance   [x]Decreased endurance   [x]Decreased posture  []Decreased sensation  []Decreased coordination   []Decreased vision  [x]Decreased safety awareness   []Increased pain       Comments:    RN cleared patient for participation in therapy session. Patient was seen this date for PT evaluation. . Patient was agreeable to intervention. Results of the functional assessment are noted above. Upon entering the room patient was found supine in bed. Willing to participate in session. Very motivated. Sat EOB x 5 minutes to increase dynamic sitting balance and activity tolerance. Transfer and gait completed with fww. NWB maintained. At end of session, patient in chair with  call light and phone within reach,  all lines and tubes intact, nursing notified.    This patient can benefit from the continuation of skilled PT possibly in a rehab setting to maximize functional level and return to PLOF. Assistance of two required due to acuity of medical condition, complex medical lines management as well as overall deconditioning of patient. Treatment:  Patient completed and was instructed in the following treatment:      Bed mobility training - pt given verbal and tactile cues to facilitate proper sequencing and safety during rolling and supine>sit as well as provided with physical assistance to complete task    Assistive device training - pt educated on using [employer] during gait, [employer] approximation/negotiation, and hand placement during sit<>stand to [employer]  STS and transfer training - educated on hand/foot placement, safety, and sequencing during STS and pivot transfers using assistive device  Gait training - verbal cues for  assistive device approximation/negotiation, upright posture, and safety during 90 and 180 degree turns during gait   Education in NWB RLE. Use of call light for safety  Application of ROM brace. Vitals and symptoms were closely monitored throughout session. Pt's/ family goals      Participate in Rehab process    Prognosis is good  for reaching above PT goals.     Patient and or family understand(s) diagnosis, prognosis, and plan of care.  yes,     PHYSICAL THERAPY PLAN OF CARE:    PT POC is established based on physician order and patient diagnosis     Diagnosis:  Fracture of tibial plateau, closed, right, initial encounter [S82.141A]  Presence of other orthopedic joint implants [Z96.698]  Closed bicondylar fracture of right tibial plateau [T47.050M]  Specific instructions for next treatment:  Transfer to bedside chair and Increase ambulation distance  Current Treatment Recommendations:     [x] Strengthening to improve independence with functional mobility   [x] ROM to improve independence with functional mobility   [x] Balance Training to improve static/dynamic balance and to reduce fall risk  [x] Endurance Training to improve activity tolerance during functional mobility   [x] Transfer Training to improve safety and independence with all functional transfers   [x] Gait Training to improve gait mechanics, endurance and asses need for appropriate assistive device  [x] Stair Training in preparation for safe discharge home and/or into the community when appropriate  [] Positioning to prevent skin breakdown and contractures  [x] Safety and Education Training   [] Patient/Caregiver Education   [] HEP  [] Gait Team to be added to POC  [] Other     PT long term treatment goals are located in above grid    Frequency of treatments: 2-5x/week x 1-2 weeks. Time in  0855  Time out  0922    Total Treatment Time  10 minutes     Evaluation Time includes thorough review of current medical information, gathering information on past medical history/social history and prior level of function, completion of standardized testing/informal observation of tasks, assessment of data and education on plan of care and goals.     CPT codes:  [x] Low Complexity PT evaluation 26770  [] Moderate Complexity PT evaluation 68789  [] High Complexity PT evaluation 33893  [] PT Re-evaluation 22578  [] Gait training 09727 - minutes  [] Manual therapy 47739  minutes  [x] Therapeutic activities 82219 -10 minutes  [] Therapeutic exercises 60866 - minutes  [] Neuromuscular reeducation [address] minutes     Leighann [place], [address] [facility]

## 2023-02-16 NOTE — CARE COORDINATION
2/16. The pt was admitted from [facility] for definitive surgery. She wants to return there. She may return. No insurance precert is needed. Envelope completed.  Neeraj Robles RN

## 2023-02-16 NOTE — PLAN OF CARE
Problem: Discharge Planning  Goal: Discharge to home or other facility with appropriate resources  Outcome: Adequate for Discharge     Problem: Safety - Adult  Goal: Free from fall injury  Outcome: Adequate for Discharge     Problem: Pain  Goal: Verbalizes/displays adequate comfort level or baseline comfort level  Outcome: Adequate for Discharge     Problem: Skin/Tissue Integrity  Goal: Absence of new skin breakdown  Description: 1. Monitor for areas of redness and/or skin breakdown  2. Assess vascular access sites hourly  3. Every 4-6 hours minimum:  Change oxygen saturation probe site  4. Every 4-6 hours:  If on nasal continuous positive airway pressure, respiratory therapy assess nares and determine need for appliance change or resting period.   Outcome: Adequate for Discharge     Problem: ABCDS Injury Assessment  Goal: Absence of physical injury  Outcome: Adequate for Discharge

## 2023-02-16 NOTE — PROGRESS NOTES
Nurse to nurse called to chriss at [facility]. All questions answered.  IV removed

## 2023-02-17 ENCOUNTER — TELEPHONE (OUTPATIENT)
Dept: ORTHOPEDIC SURGERY | Age: 83
End: 2023-02-17

## 2023-02-17 ENCOUNTER — CARE COORDINATION (OUTPATIENT)
Dept: CARE COORDINATION | Age: 83
End: 2023-02-17

## 2023-02-17 NOTE — CARE COORDINATION
[facility] Update Call    2023    Patient: Ana Bar Patient : 1940   MRN: <[id number]>  Reason for Admission:  Tibial plateau fracture, right, closed,  Discharge Date: 23 RARS: Readmission Risk Score: 12.6         Care Transitions Post Acute Facility Update    Care Transitions Interventions  Post Acute Facility: [address] Update      Email to SNF, advised SNF Pac Team will again follow patient during SNF stay, requested update

## 2023-02-17 NOTE — TELEPHONE ENCOUNTER
Patient message states that she needs a letter for work stating how long she will be off, and when she will most likely be able to return to work.        Future Appointments   Date Time Provider Bonifacio Oliveira   3/6/2023  8:45 AM SE KATHERINE Lindo [facility]   9/28/2023 10:15 AM Willie River MD [facility]       Routed

## 2023-02-17 NOTE — TELEPHONE ENCOUNTER
Patient will be NWB x 12 weeks post op, progressive weightbearing typically takes 4-6 weeks, would tentatively plan for patient to be off work x 4 months depending on the job she is returning to.  If able to return to seated desk work/nonweightbearing on operative extremity she could return sooner  Electronically signed by Martínez Fitzgerald PA-C on 2/17/2023 at 3:21 PM

## 2023-02-21 LAB — SARS-COV-2, PCR: NOT DETECTED

## 2023-02-22 ENCOUNTER — TELEPHONE (OUTPATIENT)
Dept: FAMILY MEDICINE CLINIC | Age: 83
End: 2023-02-22

## 2023-02-22 NOTE — TELEPHONE ENCOUNTER
----- Message from Lizandro Meza sent at 2/22/2023 10:57 AM EST -----  Subject: Message to Provider    QUESTIONS  Information for Provider? Patient is calling to let the PCP know that she   fell and was in the hospital on 2/5 and had surgery on 2/15 and is now in   [facility] .  ---------------------------------------------------------------------------  --------------  [id number] [email or web address]  [phone]; OK to leave message on voicemail  ---------------------------------------------------------------------------  --------------  SCRIPT ANSWERS  Relationship to Patient?  Self

## 2023-02-24 LAB — SARS-COV-2, PCR: NOT DETECTED

## 2023-02-28 LAB — SARS-COV-2, PCR: NOT DETECTED

## 2023-03-02 ENCOUNTER — CARE COORDINATION (OUTPATIENT)
Dept: CARE COORDINATION | Age: 83
End: 2023-03-02

## 2023-03-02 NOTE — CARE COORDINATION
[facility] Update Call    3/2/2023    Patient: Tyson Vazquez Patient : 1940   MRN: <[id number]>  Reason for Admission: Tibial plateau fracture, right, closed  Discharge Date: 23 RARS: Readmission Risk Score: 12.6         Post Acute Facility Update    Care Transitions Post Acute Facility Update    Care Transitions Interventions  Post Acute Facility: [address] Update  Reported Nursing Issues: 136/70, 78, 18, 97.7, 97%    Therapy: participating, currently NWB has ortho appt 3/6 possible increase in WB at that time, knee brace in place    Nursing: stable, incisional site WNL   ADLs: Minimal Assistance   Bed Mobility: Stand by Assist - Presence and Cueing   Transfer Assistance: Contact Guard Assist - Hands on patient for balance   How far (in feet) is the patient ambulating?: 0   Does patient use an assistive device?: Yes   Assistive Devices: Donnice Lightning for transfers while NWB   Barriers to Discharge: WB status   Anticipated discharge services: DC TBD following 3/6 appt with ortho- possible increase in WB status              Next IDT Planned Review: 2023    Future Appointments   Date Time Provider Bonifacio Oliveira   3/6/2023  8:45 AM SCHEDULE, SE ORTHO [facility]   2023 10:15 AM Ramona Suazo MD [facility]       SN Notes:   Diet: - Oral Diet:  General  Wounds: no wounds, R leg incision healing  Medications:  Other: facility  Other:    Post-acute CC Notes:     Shavon Verde RN

## 2023-03-03 LAB — SARS-COV-2, PCR: DETECTED

## 2023-03-09 ENCOUNTER — CARE COORDINATION (OUTPATIENT)
Dept: CARE COORDINATION | Age: 83
End: 2023-03-09

## 2023-03-09 NOTE — CARE COORDINATION
[facility] Update Call    3/9/2023    Patient: Lisa Landaverde Patient : 1940   MRN: <[id number]>  Reason for Admission: Tibial plateau fracture, right, closed  Discharge Date: 23 RARS: Readmission Risk Score: 12.6         Post Acute Facility Update    Care Transitions Post Acute Facility Update    Care Transitions Interventions  Post Acute Facility: [address] [place] [address] Acute Facility Update  Reported Nursing Issues: 124/72, 80, 16, 97.4, 96%    Nursing: patient now Covid +and asymptomatic, had to reschedule ortho appt from 3/6 to 3/13, incision healing, patient stable    Therapy: progressing, still NWB, hope for increase in WB status after 3/13 appt   ADLs: Stand by Assist - Presence and Cueing   Bed Mobility: Stand by Assist - Presence and Cueing   Transfer Assistance: Stand by Assist - Presence and Cueing   How far (in feet) is the patient ambulating?: 0   Does patient use an assistive device?: Yes   Assistive Devices: FWW for transfers, awaiting increase in WB status to ambulate              Next IDT Planned Review: 2023    Future Appointments   Date Time Provider Bonifacio Oliveira   3/13/2023  9:00 AM SCHEDULE, SE ORTHO [facility]   2023 10:15 AM Emeirta Grier MD [facility]       SN Notes:   Diet: - Oral Diet:  General  Wounds: right leg incision healing  Medications:  Other: facility  Other:    Post-acute CC Notes:     Christine Ahuja RN

## 2023-03-10 DIAGNOSIS — S82.141A TIBIAL PLATEAU FRACTURE, RIGHT, CLOSED, INITIAL ENCOUNTER: Primary | ICD-10-CM

## 2023-03-13 ENCOUNTER — OFFICE VISIT (OUTPATIENT)
Dept: ORTHOPEDIC SURGERY | Age: 83
End: 2023-03-13
Payer: MEDICARE

## 2023-03-13 ENCOUNTER — HOSPITAL ENCOUNTER (OUTPATIENT)
Dept: GENERAL RADIOLOGY | Age: 83
Discharge: HOME | End: 2023-03-15
Payer: MEDICARE

## 2023-03-13 VITALS — HEIGHT: 65 IN | WEIGHT: 120 LBS | BODY MASS INDEX: 19.99 KG/M2

## 2023-03-13 DIAGNOSIS — S82.141A TIBIAL PLATEAU FRACTURE, RIGHT, CLOSED, INITIAL ENCOUNTER: ICD-10-CM

## 2023-03-13 DIAGNOSIS — S82.141A CLOSED BICONDYLAR FRACTURE OF RIGHT TIBIAL PLATEAU: Primary | ICD-10-CM

## 2023-03-13 PROCEDURE — 73560 X-RAY EXAM OF KNEE 1 OR 2: CPT

## 2023-03-13 PROCEDURE — 99024 POSTOP FOLLOW-UP VISIT: CPT | Performed by: PHYSICIAN ASSISTANT

## 2023-03-13 PROCEDURE — 99213 OFFICE O/P EST LOW 20 MIN: CPT

## 2023-03-13 RX ORDER — ACETAMINOPHEN 325 MG/1
650 TABLET ORAL EVERY 4 HOURS PRN
COMMUNITY

## 2023-03-13 NOTE — PROGRESS NOTES
Chief Complaint   Patient presents with    Post-Op Check     Right tib plat ORIF 2/15/2023. Patient residing at [place] [address]. Is doing therapy at the facility. OP:SURGEON: Dr. Ryne Shah DO  DATE OF PROCEDURE: 2/15/22  PROCEDURE:  1. Open reduction internal fixation bicondylar tibial plateau fracture with 2 incisions  2. Removal of knee spanning external fixator right bicondylar tibial plateau fracture       Subjective:  Zayra Marmolejo is approximately 1 month follow-up from the above surgery. States she does not have any pain. Overall she is doing very well. She has maintained her hinged knee brace set 0 to 30 degrees, but has not advanced this yet. He is currently in SNF rehab facility. Physical therapy is going well. She was taking aspirin 325 mg twice daily for DVT prophylaxis, but not taking this anymore. No complaints today. Denies calf pain, CP, SOB, fever, chills, malaise. Review of Systems -  all pertinent positives and negatives in HPI. Objective:    General: Alert and oriented X 3, normocephalic atraumatic, external ears and eye normal, sclera clear, no acute distress, respirations easy and unlabored with no audible wheezes, skin warm and dry, speech and dress appropriate for noted age, affect euthymic. Extremity:    Right Lower Extremity  Skin clean dry and intact, without signs of infection  Incisions well approximated without signs of redness, warmth or drainage- sutures intact  Mild knee effusion noted  Nontender throughout the proximal leg and knee  AROM R knee 0-80  Compartments supple throughout thigh and leg  Calf supple and nontender  Demonstrates active ankle plantar/dorsiflexion/great toe extension. States sensation intact to touch in sural/deep peroneal/superficial peroneal/saphenous/posterior tibial nerve distributions to foot/ankle. Palpable dorsalis pedis and posterior tibialis pulses, cap refill brisk in toes, foot warm/perfused.            Ht 5' 5\" (1.651 m)   Wt 120 lb (54.4 kg)   BMI 19.97 kg/m²     XR:   2 views of R knee demonstrating s/p ORIF R tibial plateau fx. Hardware remains intact without interval displacement, loosening, or failure. No significant change in alignment. No acute fractures or dislocations or any other osseus abnormality identified. Assessment:   Diagnosis Orders   1. Closed bicondylar fracture of right tibial plateau  XR KNEE RIGHT (1-2 VIEWS)          Plan:  Reviewed x-rays with patient today in office         Weight Bearing: Non-weight bearing right lower extremity. No active knee extension until 6-8 weeks from surgery. Use assistive devices when needed. Therapy: Continue PT/OT. Follow tibial plateau protocol attached at the 4-week jeanine. Continue hinged range of motion brace. This was set 0 to 70 degrees today. Starting next week, could advance 10 degrees/week according to protocol. Patient should receive home health PT at time of discharge. Pain control: per facility physician, frequent ice, elevation, compression when needed. Incisional Care: sutures removed today in office. Steri strips placed. Steri strips can be removed in 7-10 days if they do not fall off sooner. Continue to monitor for signs or symptoms of infection until skin has completely healed. Recommend thin layer of Vaseline 1-2x daily over incision line to aid in healing. Okay to shower. No scrubbing near incision until skin has completely healed. Thoroughly pat dry with clean towel. DVT Prophylaxis: Continue with Aspirin 325mg BID. Follow up: 1 month, see below. Future Appointments   Date Time Provider Bonifacio Oliveira   4/17/2023 10:30 AM SCHEDULE, SE ORTHO [facility]   9/28/2023 10:15 AM [facility]             Call with any questions or concerns.    [phone]      Electronically signed by Trevor Rodriguez PA-C on 3/13/2023 at 10:01 AM  Note: This report was completed using [employer] voiced recognition software. Every effort has been made to ensure accuracy; however, inadvertent computerized transcription errors may be present.

## 2023-03-13 NOTE — PATIENT INSTRUCTIONS
INSTRUCTIONS FOR FACILITY      Weight Bearing: Non-weight bearing right lower extremity. No active knee extension until 6-8 weeks from surgery. Use assistive devices when needed.    Therapy: Continue PT/OT.  Follow tibial plateau protocol attached at the 4-week jeanine.  Continue hinged range of motion brace.  This was set 0 to 70 degrees today.  Starting next week, could advance 10 degrees/week according to protocol. Patient should receive home health PT at time of discharge.    Pain control: per facility physician, frequent ice, elevation, compression when needed.    Incisional Care: sutures removed today in office. Steri strips placed. Steri strips can be removed in 7-10 days if they do not fall off sooner. Continue to monitor for signs or symptoms of infection until skin has completely healed. Recommend thin layer of Vaseline 1-2x daily over incision line to aid in healing. Okay to shower. No scrubbing near incision until skin has completely healed. Thoroughly pat dry with clean towel.     DVT Prophylaxis: Continue with Aspirin 325mg BID.     Follow up: 1 month, see below.     Future Appointments   Date Time Provider Department Center   4/17/2023 10:30 AM SCHEDULE, SE ORTHO [facility]   9/28/2023 10:15 AM Ashley Avila MD [facility]             Call with any questions or concerns.   [phone]

## 2023-03-16 ENCOUNTER — CARE COORDINATION (OUTPATIENT)
Dept: CARE COORDINATION | Age: 83
End: 2023-03-16

## 2023-03-16 NOTE — CARE COORDINATION
[facility] Update Call    3/16/2023    Patient: Lionel Hughes Patient : 1940   MRN: <[id number]>  Reason for Admission: Tibial plateau fracture, right, closed  Discharge Date: 23 RARS: Readmission Risk Score: 12.6       Post Acute Facility Update    Care Transitions Post Acute Facility Update    Care Transitions Interventions  Post Acute Facility: [address] Update  Reported Nursing Issues: 124/72, 80, 16, 97.4, 96%    Therapy: continues with NWB, increased ROM, progressing with therapy, working on standy/pivot tranfers, plan to DC home alone with [place], 3/30 last covered date    Nursing: patient was Covid +and asymptomatic, had to reschedule ortho appt from 3/6 to 3/13, visit completed, isolation ended 3/13, incision healing, patient stable   ADLs: Stand by Assist - Presence and Cueing   Bed Mobility: Stand by Assist - Presence and Cueing   Transfer Assistance: Stand by Assist - Presence and Cueing   How far (in feet) is the patient ambulating?: 0   Does patient use an assistive device?: Yes   Assistive Devices: W/c independent,               Next IDT Planned Review: 23    Future Appointments   Date Time Provider Bonifacio Oliveira   2023 10:30 AM SCHEDULE, SE ORTHO [facility]   2023 10:15 AM Janiya Carlin MD [facility]       SN Notes:   Diet: - Oral Diet:  General  Wounds: right led incision, healing  Medications:  Other: facility  Other:    Post-acute CC Notes:     Chloe Rene RN

## 2023-03-21 ENCOUNTER — TELEPHONE (OUTPATIENT)
Dept: FAMILY MEDICINE CLINIC | Age: 83
End: 2023-03-21

## 2023-03-21 DIAGNOSIS — Z12.31 SCREENING MAMMOGRAM FOR BREAST CANCER: Primary | ICD-10-CM

## 2023-03-21 NOTE — TELEPHONE ENCOUNTER
Glenn Ortiz called regarding order for Tran's mammogram, she states order needs changed to screening and take out self referral  Alss make sure it is signed by Dr Renetta Jimenez

## 2023-03-22 ENCOUNTER — TELEPHONE (OUTPATIENT)
Dept: ORTHOPEDIC SURGERY | Age: 83
End: 2023-03-22

## 2023-03-22 NOTE — LETTER
March 24, 2023       Elda Campos YOB: 1940   [facility] [address] [id number] Date of Visit: 03/13/23       To Whom It May Concern: It is my medical opinion that Harsh Pedro would benefit from continued stay at a nursing or rehab facility. Patient suffered a tibial plateau fracture on 67/79/41 and required orthopedic surgery. She remains nonweightbearing to her RLE. Patient remains a high fall risk and requires extensive physical therapy to work on ROM, Strengthening, Flexibility, Mobilization (soft tissue/joint), Balance/Coordination, Functional Activity, Instrument assisted soft tissue mobilization, Modalities at therapist discretion, Manual therapy at therapist discretion, ADLs, Posture/Body Mechanics, Pelvic stabilization, Gait training/WB status, Scar mobilization, Edema control, Desensitization and Home Exercise Program.     Please see attached tibial plateau protocol. OP:SURGEON: Dr. Gwyn Clemente DO  DATE OF PROCEDURE: 2/15/22  PROCEDURE:1.Open reduction internal fixation bicondylar tibial plateau fracture with 2 incisions 2. Removal of knee spanning external fixator right bicondylar tibial plateau fracture    Date of Procedure: 2/6/2023  Procedure(s):  1. Closed reduction right bicondylar tibial plateau fracture required manipulation under general anesthesia  2. Application right knee spanning external fixation  Surgeon(s): Anna Suazo, DO      If you have any questions or concerns, please don't hesitate to call.     Sincerely,        [employer], DO

## 2023-03-22 NOTE — TELEPHONE ENCOUNTER
Called Jessica back, no answer. Left message and advised that WB status is NWB at this time. Advised to call with any further questions.      Future Appointments   Date Time Provider Bonifacio Oliveira   4/17/2023 10:30 AM SCHEDULE, SE ORTHO APC SE Ortho [facility]   4/20/2023 10:30 AM NIKKI VILLELA SARAH RM 1 NIKKI VILLELA [facility] Radiol   9/28/2023 10:15 AM MD Rocio Chase [facility]

## 2023-03-22 NOTE — TELEPHONE ENCOUNTER
Patient's daughter called to verify patient's WB status. Requests call back.      Future Appointments   Date Time Provider Bonifacio Oliveira   4/17/2023 10:30 AM SCHEDULE, SE ORTHO APC  Ortho [facility]   4/20/2023 10:30 AM NIKKI VILLELA [facility] RM 1 NIKKI VILLELA [facility] Radiolo   9/28/2023 10:15 AM MD Rocio Mcdowell [facility]

## 2023-03-23 ENCOUNTER — CARE COORDINATION (OUTPATIENT)
Dept: CARE COORDINATION | Age: 83
End: 2023-03-23

## 2023-03-24 NOTE — TELEPHONE ENCOUNTER
Patient's daughter, Tere Drake, called office requesting reconsideration of NWB status, as medicare is cutting her from her rehab stay. Discussed risks of WB too soon. Educated on tib plateau protocol. Tere Drake requesting letter to send with appeal to insurance. Letter written at this time and faxed to daughter on secure fax line. Encouraged to call with questions or concerns.     Future Appointments   Date Time Provider Bonifacio Oliveira   4/17/2023 10:30 AM SCHEDULE, SE ORTHO APC SE Ortho [facility]   4/20/2023 10:30 AM NIKKI VILLELA [facility] RM 1 YZ MANOHAR [facility] Radiolo   9/28/2023 10:15 AM MD Rocio Anderson [facility]

## 2023-03-24 NOTE — CARE COORDINATION
[facility] Update Call    3/24/2023    Patient: Tatum Kaplan Patient : 1940   MRN: <[id number]>  Reason for Admission: Tibial plateau fracture, right, closed  Discharge Date: 23 RARS: Readmission Risk Score: 12.6       Post Acute Facility Update    Care Transitions Post Acute Facility Update    Care Transitions Interventions  Post Acute Facility: [address] [place] [address] Acute Facility Update  Reported Nursing Issues: 118/72, 16, 68, 98.4    Therapy: progressing, plan to DC to home next week, will be NWB for 4 more weeks    Nursing: stable, incision healing, agree with safe to DC next week         ADLs: Stand by Assist - Presence and Cueing   Bed Mobility: Stand by Assist - Presence and Cueing   Transfer Assistance: Stand by Assist - Presence and Cueing   How far (in feet) is the patient ambulating?: 0   Does patient use an assistive device?: Yes   Assistive Devices: W/C mod Independent, NWB for additional 4 weeks   Barriers to Discharge: None, DC home 3/31   Anticipated date for discharge: 3/31/23    Anticipated discharge services: Home with [facility]              Next IDT Planned Review: 23    Future Appointments   Date Time Provider Bonifacio Oliveira   2023 10:30 AM SCHEDULE, SE ORTHO APC SE Ortho [facility]   2023 10:15 AM MD Rocio Baldwin [facility]       SN Notes:   Diet: - Oral Diet:  General  Wounds: right leg surgical site lealing  Medications:  Other: facility  Other:    Post-acute CC Notes:     Minda Junior RN

## 2023-03-30 ENCOUNTER — CARE COORDINATION (OUTPATIENT)
Dept: CARE COORDINATION | Age: 83
End: 2023-03-30

## 2023-03-31 NOTE — CARE COORDINATION
[facility] Update Call    3/31/2023    Patient: Marsha Trevino Patient : 1940   MRN: <[id number]>  Reason for Admission: Tibial plateau fracture, right, closed  Discharge Date: 23 RARS: Readmission Risk Score: 12.6         Post Acute Facility Update    Care Transitions Post Acute Facility Update    Care Transitions Interventions  Post Acute Facility: [address] Update  Reported Nursing Issues: 128/74, 88, 18, 97.2    Nursing: stable, incision CEFERINO, plan for DC     Therapy: NWB, had planned for DC 3/31 now plan for DC  working on stairs for home, lives alone, family installing handrails prior to DC   ADLs: Contact Guard Assist - Hands on patient for balance   Bed Mobility: Independent   Transfer Assistance: Stand by Assist - Presence and Cueing   How far (in feet) is the patient ambulating?: 0   Does patient use an assistive device?: Yes   Assistive Devices: WC while NWB, working on stairs   Barriers to Discharge: NWB,    Anticipated date for discharge: 23    Anticipated discharge services: Home with [facility] Planned Review: 2023    Future Appointments   Date Time Provider Bonifacio Oliveira   2023 10:30 AM SCHEDULE, SE ORTHO APC SE Ortho [facility]   2023 10:15 AM MD Rocio Barnett [facility]       SN Notes:   Diet: - Oral Diet:  General  Wounds: right leg surgical incision CEFERINO healing  Medications:  Other: facility  Other:    Post-acute CC Notes:     Ren Lopez RN

## 2023-04-07 ENCOUNTER — CARE COORDINATION (OUTPATIENT)
Dept: CARE COORDINATION | Age: 83
End: 2023-04-07

## 2023-04-07 NOTE — CARE COORDINATION
[facility] Update Call    2023    Patient: Mitzy All Patient : 1940   MRN: <[id number]>  Reason for Admission: Tibial plateau fracture, right, closed  Discharge Date: 23 RARS: Readmission Risk Score: 12.6         Care Transitions Post Acute Facility Update    Care Transitions Interventions  Post Acute Facility Update      Received email from Jose Guadalupe barber at [facility]- no IDT due to vacation, advised can call facility to talk to Levi Ram in therapy for update, call to [facility] for Levi Ram to call with pt update, appears DC anticipated

## 2023-04-17 ENCOUNTER — HOSPITAL ENCOUNTER (OUTPATIENT)
Dept: GENERAL RADIOLOGY | Age: 83
Discharge: HOME | End: 2023-04-19
Payer: MEDICARE

## 2023-04-17 ENCOUNTER — OFFICE VISIT (OUTPATIENT)
Dept: ORTHOPEDIC SURGERY | Age: 83
End: 2023-04-17
Payer: MEDICARE

## 2023-04-17 DIAGNOSIS — S82.141A CLOSED BICONDYLAR FRACTURE OF RIGHT TIBIAL PLATEAU: ICD-10-CM

## 2023-04-17 DIAGNOSIS — S82.141A CLOSED BICONDYLAR FRACTURE OF RIGHT TIBIAL PLATEAU: Primary | ICD-10-CM

## 2023-04-17 PROCEDURE — 73560 X-RAY EXAM OF KNEE 1 OR 2: CPT

## 2023-04-17 PROCEDURE — 99212 OFFICE O/P EST SF 10 MIN: CPT

## 2023-04-17 PROCEDURE — 99024 POSTOP FOLLOW-UP VISIT: CPT | Performed by: PHYSICIAN ASSISTANT

## 2023-04-17 NOTE — PATIENT INSTRUCTIONS
Nonweightbearing right lower extremity. Continue right hinged ROM knee brace. Okay to remove brace for hygiene, PT and when sitting around the house to work on knee range of motion exercises. Continue aspirin 81 mg twice daily for DVT prophylaxis. Follow-up in 3 weeks for reevaluation, x-rays and possible progression of weightbearing status. Future Appointments   Date Time Provider Bonifacio Oliveira   5/4/2023  9:00 AM Tamar Beltrán MD [facility]   5/16/2023 10:00 AM DO SE Sol Velazco [facility]   9/28/2023 10:15 AM Tamar Beltrán MD [facility]         Call if any questions or concerns.

## 2023-04-17 NOTE — PROGRESS NOTES
warm/perfused. There were no vitals taken for this visit. XR:   2 views right knee demonstrate ORIF right bicondylar tibial plateau fracture with hardware in stable position and alignment. No evidence of hardware loosening or failure. Assessment:   Diagnosis Orders   1. Closed bicondylar fracture of right tibial plateau          Plan:  Xrays reviewed with patient. Plan of care discussed in detail, all questions sought and answered to patients satisfaction at this time. Nonweightbearing right lower extremity. Continue right hinged ROM knee brace. Okay to remove brace for hygiene, PT and when sitting around the house to work on knee range of motion exercises. Continue aspirin 81 mg twice daily for DVT prophylaxis. Follow-up in 3 weeks for reevaluation, x-rays and possible progression of weightbearing status. Future Appointments   Date Time Provider Bonifacio Oliveira   5/4/2023  9:00 AM Lindy Larkin MD [facility]   5/16/2023 10:00 AM DO SE Sol [facility]   9/28/2023 10:15 AM Lindy Larkin MD [facility]     Call if any questions or concerns. Electronically signed by GIANNA Fox on 4/17/2023 at 11:11 AM  Note: This report was completed using computerize voiced recognition software. Every effort has been made to ensure accuracy; however, inadvertent computerized transcription errors may be present.

## 2023-05-04 ENCOUNTER — OFFICE VISIT (OUTPATIENT)
Dept: FAMILY MEDICINE CLINIC | Age: 83
End: 2023-05-04

## 2023-05-04 VITALS
HEART RATE: 69 BPM | SYSTOLIC BLOOD PRESSURE: 125 MMHG | HEIGHT: 66 IN | TEMPERATURE: 97.7 F | OXYGEN SATURATION: 96 % | WEIGHT: 119.2 LBS | BODY MASS INDEX: 19.16 KG/M2 | DIASTOLIC BLOOD PRESSURE: 66 MMHG | RESPIRATION RATE: 16 BRPM

## 2023-05-04 DIAGNOSIS — Z87.81 S/P TIBIAL FRACTURE: Primary | ICD-10-CM

## 2023-05-04 SDOH — ECONOMIC STABILITY: FOOD INSECURITY: WITHIN THE PAST 12 MONTHS, YOU WORRIED THAT YOUR FOOD WOULD RUN OUT BEFORE YOU GOT MONEY TO BUY MORE.: NEVER TRUE

## 2023-05-04 SDOH — ECONOMIC STABILITY: HOUSING INSECURITY
IN THE LAST 12 MONTHS, WAS THERE A TIME WHEN YOU DID NOT HAVE A STEADY PLACE TO SLEEP OR SLEPT IN A SHELTER (INCLUDING NOW)?: NO

## 2023-05-04 SDOH — ECONOMIC STABILITY: INCOME INSECURITY: HOW HARD IS IT FOR YOU TO PAY FOR THE VERY BASICS LIKE FOOD, HOUSING, MEDICAL CARE, AND HEATING?: NOT HARD AT ALL

## 2023-05-04 SDOH — ECONOMIC STABILITY: FOOD INSECURITY: WITHIN THE PAST 12 MONTHS, THE FOOD YOU BOUGHT JUST DIDN'T LAST AND YOU DIDN'T HAVE MONEY TO GET MORE.: NEVER TRUE

## 2023-05-04 ASSESSMENT — PATIENT HEALTH QUESTIONNAIRE - PHQ9
2. FEELING DOWN, DEPRESSED OR HOPELESS: 0
SUM OF ALL RESPONSES TO PHQ QUESTIONS 1-9: 0
1. LITTLE INTEREST OR PLEASURE IN DOING THINGS: 0
SUM OF ALL RESPONSES TO PHQ QUESTIONS 1-9: 0
SUM OF ALL RESPONSES TO PHQ9 QUESTIONS 1 & 2: 0

## 2023-05-04 NOTE — PROGRESS NOTES
Chief Complaint   Patient presents with    Other     Follow up for surgery on right leg       HPI:  Patient is here for follow-up of fall and post-op fracture repair to right leg in February. Patient states she was advised to follow up with PCP and states she is doing well. Has Follow up with Dr Franco Abrams on 5/16. Patient's past medical, surgical, social and/or family history reviewed, updated in chart, and are non-contributory (unless otherwise stated). Medications and allergies also reviewed and updated in chart. Review of Systems:  Constitutional:  No fever, no fatigue, no chills, no headaches, no weight change  Dermatology:  No rash, no mole, no dry or sensitive skin  ENT:  No cough, no sore throat, no sinus pain, no runny nose, no ear pain  Cardiology:  No chest pain, no palpitations, no leg edema, no shortness of breath, no PND  Gastroenterology:  No dysphagia, no abdominal pain, no nausea, no vomiting, no constipation, no diarrhea, no heartburn  Musculoskeletal:  No joint pain, no leg cramps, no back pain, no muscle aches  Respiratory:  No shortness of breath, no orthopnea, no wheezing, no HAYWARD, no hemoptysis  Urology:  No blood in the urine, no urinary frequency, no urinary incontinence, no urinary urgency, no nocturia, no dysuria  Vitals:    05/04/23 0918   BP: 125/66   Pulse: 69   Resp: 16   Temp: 97.7 °F (36.5 °C)   TempSrc: Temporal   SpO2: 96%   Weight: 119 lb 3.2 oz (54.1 kg)   Height: 5' 5.5\" (1.664 m)       General:  Patient alert and oriented x 3, NAD, pleasant  HEENT:  Atraumatic, normocephalic, PERRLA, EOMI, clear conjunctiva, TMs clear, nose-clear, throat - no erythema  Neck:  Supple, no goiter, no carotid bruits, no lymphadenopathy  Lungs:  CTA B  Heart:  RRR, no murmurs, gallops or rubs  Abdomen:  Soft/nt/nd, + bowel sounds  Extremities:  No clubbing, cyanosis or edema  Skin: unremarkable    Assessment/Plan:      Alphonso Mendoza was seen today for other.     Diagnoses and all orders for

## 2023-05-15 DIAGNOSIS — S82.141A CLOSED BICONDYLAR FRACTURE OF RIGHT TIBIAL PLATEAU: Primary | ICD-10-CM

## 2023-05-16 ENCOUNTER — OFFICE VISIT (OUTPATIENT)
Dept: ORTHOPEDIC SURGERY | Age: 83
End: 2023-05-16
Payer: MEDICARE

## 2023-05-16 ENCOUNTER — HOSPITAL ENCOUNTER (OUTPATIENT)
Dept: GENERAL RADIOLOGY | Age: 83
Discharge: HOME | End: 2023-05-18
Payer: MEDICARE

## 2023-05-16 VITALS — HEIGHT: 65 IN | BODY MASS INDEX: 20.83 KG/M2 | WEIGHT: 125 LBS

## 2023-05-16 DIAGNOSIS — S82.141A CLOSED BICONDYLAR FRACTURE OF RIGHT TIBIAL PLATEAU: ICD-10-CM

## 2023-05-16 DIAGNOSIS — S82.141A CLOSED BICONDYLAR FRACTURE OF RIGHT TIBIAL PLATEAU: Primary | ICD-10-CM

## 2023-05-16 PROCEDURE — 73560 X-RAY EXAM OF KNEE 1 OR 2: CPT

## 2023-05-16 PROCEDURE — 99213 OFFICE O/P EST LOW 20 MIN: CPT | Performed by: PHYSICIAN ASSISTANT

## 2023-05-16 PROCEDURE — 99024 POSTOP FOLLOW-UP VISIT: CPT | Performed by: PHYSICIAN ASSISTANT

## 2023-05-16 NOTE — PATIENT INSTRUCTIONS
Start progressing with weightbearing as tolerated on right lower extremity  Okay to continue use of assistive device for balance as needed  Okay to discontinue hinged range of motion knee brace    Progress with activities as tolerated  Okay to continue with Tylenol, ice and heat as needed for right knee pain    If you have significant repeat pain after activity or swelling that is not improving with rest please contact our office    Plan for follow-up in 8 weeks for repeat evaluation and x-rays

## 2023-05-22 NOTE — PROGRESS NOTES
Chief Complaint   Patient presents with    Follow-up    Post-Op Check     10 week p o check ex fix and tib plateau ORIF 8/02.60. Patient states that she is no pain, continues with therapy at home twice a week but this will be ending this week. Using wheeled walker at home. Continues with brace and is compliant. OP:SURGEON: Dr. Vazquez Linda DO  DATE OF PROCEDURE: 2-15-23  PROCEDURE: 1. Open reduction internal fixation bicondylar tibial plateau fracture with 2 incisions  2. Removal of knee spanning external fixator right bicondylar tibial plateau fracture    POD: 3 months    Subjective:  Cesario Mooney is following up from the above surgery. She is NWB on right lower extremity. She ambulates with assistive device, walker. Pain to extremity is mild and is not taking prescribed pain medication. They denies numbness, tingling, weakness to the right lower extremity. Denies calf pain, chest pain, or shortness of breath. Patient has finished DVT prophylaxis, ASA 81 mg BID. Patient is  participating in therapy, home health care . Patient feels she is doing very well after surgery would like to progress with weightbearing     Review of Systems -  All pertinent positives/negative in HPI     Objective:    General: Alert and oriented X 3, normocephalic atraumatic, external ears and eye normal, sclera clear, no acute distress, respirations easy and unlabored with no audible wheezes, skin warm and dry, speech and dress appropriate for noted age, affect euthymic.     Extremity:  Right Lower Extremity  Skin clean dry and intact, without signs of infection   Incisions well healed without hypertrophic or keloid changes  mild edema noted about the knee  Compartments supple throughout thigh and leg  Calf supple and not tender  negative Homans  Demonstrates active knee extension/flexion (0-120), ankle DF/PF, + EHL  Mild crepitus at PFJ, no instability with varus/valgus stress  States sensation intact to touch in sural,

## 2023-06-06 ENCOUNTER — HOSPITAL ENCOUNTER (OUTPATIENT)
Dept: GENERAL RADIOLOGY | Age: 83
Discharge: HOME | End: 2023-06-08
Attending: FAMILY MEDICINE
Payer: MEDICARE

## 2023-06-06 VITALS — WEIGHT: 120 LBS | HEIGHT: 65 IN | BODY MASS INDEX: 19.99 KG/M2

## 2023-06-06 DIAGNOSIS — Z12.31 SCREENING MAMMOGRAM FOR BREAST CANCER: ICD-10-CM

## 2023-06-06 PROCEDURE — 77063 BREAST TOMOSYNTHESIS BI: CPT

## 2023-07-10 DIAGNOSIS — S82.141A CLOSED BICONDYLAR FRACTURE OF RIGHT TIBIAL PLATEAU: Primary | ICD-10-CM

## 2023-07-13 ENCOUNTER — OFFICE VISIT (OUTPATIENT)
Dept: ORTHOPEDIC SURGERY | Age: 83
End: 2023-07-13
Payer: MEDICARE

## 2023-07-13 ENCOUNTER — HOSPITAL ENCOUNTER (OUTPATIENT)
Dept: GENERAL RADIOLOGY | Age: 83
Discharge: HOME | End: 2023-07-15
Payer: MEDICARE

## 2023-07-13 VITALS — WEIGHT: 120 LBS | BODY MASS INDEX: 19.99 KG/M2 | HEIGHT: 65 IN

## 2023-07-13 DIAGNOSIS — S82.141A CLOSED BICONDYLAR FRACTURE OF RIGHT TIBIAL PLATEAU: ICD-10-CM

## 2023-07-13 DIAGNOSIS — S82.141A CLOSED BICONDYLAR FRACTURE OF RIGHT TIBIAL PLATEAU: Primary | ICD-10-CM

## 2023-07-13 PROCEDURE — 73560 X-RAY EXAM OF KNEE 1 OR 2: CPT

## 2023-07-13 NOTE — PROGRESS NOTES
abnormality identified. Assessment:   Diagnosis Orders   1. Closed bicondylar fracture of right tibial plateau            Plan:  Reviewed x-rays with patient today in office   Continue WBAT R LE   Continue desensitizing the knee and leg  Continue HEP and remaining active     Follow up prn    Electronically signed by Bandar Rojas PA-C on 7/13/2023 at 9:21 AM  Note: This report was completed using [employer] voiced recognition software. Every effort has been made to ensure accuracy; however, inadvertent computerized transcription errors may be present.

## 2023-09-28 ENCOUNTER — OFFICE VISIT (OUTPATIENT)
Dept: FAMILY MEDICINE CLINIC | Age: 83
End: 2023-09-28
Payer: MEDICARE

## 2023-09-28 VITALS
DIASTOLIC BLOOD PRESSURE: 79 MMHG | RESPIRATION RATE: 16 BRPM | OXYGEN SATURATION: 99 % | TEMPERATURE: 97.4 F | BODY MASS INDEX: 19.22 KG/M2 | WEIGHT: 115.4 LBS | HEIGHT: 65 IN | SYSTOLIC BLOOD PRESSURE: 190 MMHG | HEART RATE: 55 BPM

## 2023-09-28 DIAGNOSIS — E78.5 HYPERLIPIDEMIA, UNSPECIFIED HYPERLIPIDEMIA TYPE: ICD-10-CM

## 2023-09-28 DIAGNOSIS — Z00.00 MEDICARE ANNUAL WELLNESS VISIT, SUBSEQUENT: Primary | ICD-10-CM

## 2023-09-28 LAB
ALBUMIN SERPL-MCNC: 4.5 G/DL (ref 3.5–5.2)
ALP BLD-CCNC: 116 U/L (ref 35–104)
ALT SERPL-CCNC: 7 U/L (ref 0–32)
ANION GAP SERPL CALCULATED.3IONS-SCNC: 18 MMOL/L (ref 7–16)
AST SERPL-CCNC: 16 U/L (ref 0–31)
BILIRUB SERPL-MCNC: 0.4 MG/DL (ref 0–1.2)
BUN BLDV-MCNC: 15 MG/DL (ref 6–23)
CALCIUM SERPL-MCNC: 9.6 MG/DL (ref 8.6–10.2)
CHLORIDE BLD-SCNC: 103 MMOL/L (ref 98–107)
CHOLESTEROL: 277 MG/DL
CO2: 20 MMOL/L (ref 22–29)
CREAT SERPL-MCNC: 0.8 MG/DL (ref 0.5–1)
GFR SERPL CREATININE-BSD FRML MDRD: >60 ML/MIN/1.73M2
GLUCOSE BLD-MCNC: 78 MG/DL (ref 74–99)
HCT VFR BLD CALC: 43.5 % (ref 34–48)
HDLC SERPL-MCNC: 101 MG/DL
HEMOGLOBIN: 13.8 G/DL (ref 11.5–15.5)
LDL CHOLESTEROL: 159 MG/DL
MCH RBC QN AUTO: 29.1 PG (ref 26–35)
MCHC RBC AUTO-ENTMCNC: 31.7 G/DL (ref 32–34.5)
MCV RBC AUTO: 91.8 FL (ref 80–99.9)
PDW BLD-RTO: 13.4 % (ref 11.5–15)
PLATELET # BLD: 383 K/UL (ref 130–450)
PMV BLD AUTO: 9.7 FL (ref 7–12)
POTASSIUM SERPL-SCNC: 3.8 MMOL/L (ref 3.5–5)
RBC # BLD: 4.74 M/UL (ref 3.5–5.5)
SODIUM BLD-SCNC: 141 MMOL/L (ref 132–146)
TOTAL PROTEIN: 7.7 G/DL (ref 6.4–8.3)
TRIGL SERPL-MCNC: 84 MG/DL
TSH SERPL DL<=0.05 MIU/L-ACNC: 1.6 UIU/ML (ref 0.27–4.2)
VLDLC SERPL CALC-MCNC: 17 MG/DL
WBC # BLD: 5.6 K/UL (ref 4.5–11.5)

## 2023-09-28 PROCEDURE — 1123F ACP DISCUSS/DSCN MKR DOCD: CPT | Performed by: FAMILY MEDICINE

## 2023-09-28 PROCEDURE — G0439 PPPS, SUBSEQ VISIT: HCPCS | Performed by: FAMILY MEDICINE

## 2023-09-28 ASSESSMENT — PATIENT HEALTH QUESTIONNAIRE - PHQ9
SUM OF ALL RESPONSES TO PHQ QUESTIONS 1-9: 0
SUM OF ALL RESPONSES TO PHQ9 QUESTIONS 1 & 2: 0
2. FEELING DOWN, DEPRESSED OR HOPELESS: 0
SUM OF ALL RESPONSES TO PHQ QUESTIONS 1-9: 0
1. LITTLE INTEREST OR PLEASURE IN DOING THINGS: 0

## 2023-09-28 NOTE — PATIENT INSTRUCTIONS
preventive health benefits. Some of the tests and screenings are paid in full while other may be subject to a deductible, co-insurance, and/or copay. Some of these benefits include a comprehensive review of your medical history including lifestyle, illnesses that may run in your family, and various assessments and screenings as appropriate. After reviewing your medical record and screening and assessments performed today your provider may have ordered immunizations, labs, imaging, and/or referrals for you. A list of these orders (if applicable) as well as your Preventive Care list are included within your After Visit Summary for your review. Other Preventive Recommendations:    A preventive eye exam performed by an eye specialist is recommended every 1-2 years to screen for glaucoma; cataracts, macular degeneration, and other eye disorders. A preventive dental visit is recommended every 6 months. Try to get at least 150 minutes of exercise per week or 10,000 steps per day on a pedometer . Order or download the FREE \"Exercise & Physical Activity: Your Everyday Guide\" from [employer]. Call 0-635.988.4171 or search [employer] online. You need 8776-4200 mg of calcium and 6151-9538 IU of vitamin D per day. It is possible to meet your calcium requirement with diet alone, but a vitamin D supplement is usually necessary to meet this goal.  When exposed to the sun, use a sunscreen that protects against both UVA and UVB radiation with an SPF of 30 or greater. Reapply every 2 to 3 hours or after sweating, drying off with a towel, or swimming. Always wear a seat belt when traveling in a car. Always wear a helmet when riding a bicycle or motorcycle.

## 2024-06-11 NOTE — TELEPHONE ENCOUNTER
Orders placed ok to fax [Well Developed] : well developed [Well Nourished] : well nourished [No Acute Distress] : no acute distress [Normal Conjunctiva] : normal conjunctiva [Normal Venous Pressure] : normal venous pressure [No Carotid Bruit] : no carotid bruit [Normal S1, S2] : normal S1, S2 [No Murmur] : no murmur [No Rub] : no rub [No Gallop] : no gallop [Clear Lung Fields] : clear lung fields [Good Air Entry] : good air entry [No Respiratory Distress] : no respiratory distress  [Soft] : abdomen soft [Non Tender] : non-tender [No Masses/organomegaly] : no masses/organomegaly [Normal Bowel Sounds] : normal bowel sounds [Normal Gait] : normal gait [No Edema] : no edema [No Cyanosis] : no cyanosis [No Clubbing] : no clubbing [No Varicosities] : no varicosities [No Rash] : no rash [No Skin Lesions] : no skin lesions [Moves all extremities] : moves all extremities [No Focal Deficits] : no focal deficits [Normal Speech] : normal speech [Alert and Oriented] : alert and oriented [Normal memory] : normal memory

## 2024-10-01 ENCOUNTER — OFFICE VISIT (OUTPATIENT)
Dept: FAMILY MEDICINE CLINIC | Age: 84
End: 2024-10-01

## 2024-10-01 VITALS
TEMPERATURE: 97.1 F | DIASTOLIC BLOOD PRESSURE: 82 MMHG | RESPIRATION RATE: 16 BRPM | HEART RATE: 58 BPM | SYSTOLIC BLOOD PRESSURE: 120 MMHG | OXYGEN SATURATION: 95 % | WEIGHT: 116.4 LBS | BODY MASS INDEX: 19.39 KG/M2 | HEIGHT: 65 IN

## 2024-10-01 DIAGNOSIS — E78.5 HYPERLIPIDEMIA, UNSPECIFIED HYPERLIPIDEMIA TYPE: ICD-10-CM

## 2024-10-01 DIAGNOSIS — Z00.00 MEDICARE ANNUAL WELLNESS VISIT, SUBSEQUENT: Primary | ICD-10-CM

## 2024-10-01 LAB
ALBUMIN: 4.3 G/DL (ref 3.5–5.2)
ALP BLD-CCNC: 111 U/L (ref 35–104)
ALT SERPL-CCNC: 6 U/L (ref 0–32)
ANION GAP SERPL CALCULATED.3IONS-SCNC: 11 MMOL/L (ref 7–16)
AST SERPL-CCNC: 15 U/L (ref 0–31)
BILIRUB SERPL-MCNC: 0.4 MG/DL (ref 0–1.2)
BUN BLDV-MCNC: 14 MG/DL (ref 6–23)
CALCIUM SERPL-MCNC: 9.2 MG/DL (ref 8.6–10.2)
CHLORIDE BLD-SCNC: 102 MMOL/L (ref 98–107)
CHOLESTEROL, TOTAL: 279 MG/DL
CO2: 25 MMOL/L (ref 22–29)
CREAT SERPL-MCNC: 0.8 MG/DL (ref 0.5–1)
GFR, ESTIMATED: 69 ML/MIN/1.73M2
GLUCOSE BLD-MCNC: 99 MG/DL (ref 74–99)
HCT VFR BLD CALC: 42 % (ref 34–48)
HDLC SERPL-MCNC: 90 MG/DL
HEMOGLOBIN: 14.2 G/DL (ref 11.5–15.5)
LDL CHOLESTEROL: 169 MG/DL
MCH RBC QN AUTO: 31.1 PG (ref 26–35)
MCHC RBC AUTO-ENTMCNC: 33.8 G/DL (ref 32–34.5)
MCV RBC AUTO: 92.1 FL (ref 80–99.9)
PDW BLD-RTO: 14.5 % (ref 11.5–15)
PLATELET # BLD: 335 K/UL (ref 130–450)
PMV BLD AUTO: 9.7 FL (ref 7–12)
POTASSIUM SERPL-SCNC: 4.1 MMOL/L (ref 3.5–5)
RBC # BLD: 4.56 M/UL (ref 3.5–5.5)
SODIUM BLD-SCNC: 138 MMOL/L (ref 132–146)
TOTAL PROTEIN: 7.1 G/DL (ref 6.4–8.3)
TRIGL SERPL-MCNC: 101 MG/DL
TSH SERPL DL<=0.05 MIU/L-ACNC: 1.79 UIU/ML (ref 0.27–4.2)
VLDLC SERPL CALC-MCNC: 20 MG/DL
WBC # BLD: 5.1 K/UL (ref 4.5–11.5)

## 2024-10-01 SDOH — ECONOMIC STABILITY: FOOD INSECURITY: WITHIN THE PAST 12 MONTHS, YOU WORRIED THAT YOUR FOOD WOULD RUN OUT BEFORE YOU GOT MONEY TO BUY MORE.: NEVER TRUE

## 2024-10-01 SDOH — ECONOMIC STABILITY: FOOD INSECURITY: WITHIN THE PAST 12 MONTHS, THE FOOD YOU BOUGHT JUST DIDN'T LAST AND YOU DIDN'T HAVE MONEY TO GET MORE.: NEVER TRUE

## 2024-10-01 SDOH — ECONOMIC STABILITY: INCOME INSECURITY: HOW HARD IS IT FOR YOU TO PAY FOR THE VERY BASICS LIKE FOOD, HOUSING, MEDICAL CARE, AND HEATING?: NOT HARD AT ALL

## 2024-10-01 ASSESSMENT — PATIENT HEALTH QUESTIONNAIRE - PHQ9
SUM OF ALL RESPONSES TO PHQ QUESTIONS 1-9: 0
SUM OF ALL RESPONSES TO PHQ QUESTIONS 1-9: 0
SUM OF ALL RESPONSES TO PHQ9 QUESTIONS 1 & 2: 0
2. FEELING DOWN, DEPRESSED OR HOPELESS: NOT AT ALL
SUM OF ALL RESPONSES TO PHQ QUESTIONS 1-9: 0
1. LITTLE INTEREST OR PLEASURE IN DOING THINGS: NOT AT ALL
SUM OF ALL RESPONSES TO PHQ QUESTIONS 1-9: 0

## 2024-10-01 ASSESSMENT — LIFESTYLE VARIABLES
HOW MANY STANDARD DRINKS CONTAINING ALCOHOL DO YOU HAVE ON A TYPICAL DAY: 1 OR 2
HOW OFTEN DO YOU HAVE A DRINK CONTAINING ALCOHOL: MONTHLY OR LESS

## 2024-10-01 NOTE — PROGRESS NOTES
Medicare Annual Wellness Visit    Tran Sunshine is here for Medicare AWV    Assessment & Plan   Medicare annual wellness visit, subsequent  Hyperlipidemia, unspecified hyperlipidemia type  -     Comprehensive Metabolic Panel; Future  -     CBC; Future  -     Lipid Panel; Future  -     TSH; Future    Recommendations for Preventive Services Due: see orders and patient instructions/AVS.  Recommended screening schedule for the next 5-10 years is provided to the patient in written form: see Patient Instructions/AVS.     Return for Medicare Annual Wellness Visit in 1 year.     Subjective       Patient's complete Health Risk Assessment and screening values have been reviewed and are found in Flowsheets. The following problems were reviewed today and where indicated follow up appointments were made and/or referrals ordered.    No Positive Risk Factors identified today.                     Poor Eating Habits/Diet:  Do you eat balanced/healthy meals regularly?: (!) No       Vision Screen:  Do you have difficulty driving, watching TV, or doing any of your daily activities because of your eyesight?: No  Have you had an eye exam within the past year?: (!) No              Objective   Vitals:    10/01/24 1040   BP: 120/82   Pulse: 58   Resp: 16   Temp: 97.1 °F (36.2 °C)   TempSrc: Temporal   SpO2: 95%   Weight: 52.8 kg (116 lb 6.4 oz)   Height: 1.651 m (5' 5\")      Body mass index is 19.37 kg/m².      General Appearance: alert and oriented to person, place and time, well developed and well- nourished, in no acute distress  Skin: warm and dry, no rash or erythema  Head: normocephalic and atraumatic  Eyes: pupils equal, round, and reactive to light, extraocular eye movements intact, conjunctivae normal  ENT: tympanic membrane, external ear and ear canal normal bilaterally, nose without deformity, nasal mucosa and turbinates normal without polyps  Neck: supple and non-tender without mass, no thyromegaly or thyroid nodules, no

## 2024-10-01 NOTE — PROGRESS NOTES
Medicare Annual Wellness Visit    Tran Sunshine is here for her Annual Wellness Visit     Assessment & Plan   {There are no diagnoses linked to this encounter. (Refresh or delete this SmartLink)}  Recommendations for Preventive Services Due: see orders and patient instructions/AVS.  Recommended screening schedule for the next 5-10 years is provided to the patient in written form: see Patient Instructions/AVS.     No follow-ups on file.     Subjective   {OPTIONAL - WILL AUTO-DELETE IF NOT USED:[id number]}    Patient's complete Health Risk Assessment and screening values have been reviewed and are found in Flowsheets. The following problems were reviewed today and where indicated follow up appointments were made and/or referrals ordered.    No Positive Risk Factors identified today.    {OPTIONAL- LDCT, CVD, STI Counseling Statements:[id number]}                                Objective   Vitals:    10/01/24 1040   Resp: 16   Weight: 52.8 kg (116 lb 6.4 oz)   Height: 1.651 m (5' 5\")      Body mass index is 19.37 kg/m².        {OPTIONAL - GENERAL PHYSICAL EXAM (WILL AUTO-DELETE IF NOT USED):[id number]}            Allergies   Allergen Reactions    Tetracyclines & Related Rash     Prior to Visit Medications    Not on File       CareTeam (Including outside providers/suppliers regularly involved in providing care):   Patient Care Team:  Ashley Avila MD as PCP - General  Ashley Avila MD as PCP - [facility]      Reviewed and updated this visit:  Tobacco  Allergies  Meds  Med Hx  Surg Hx  Soc Hx  Fam Hx      I, Josh Jordan MA, 10/1/2024, performed the documented evaluation under the direct supervision of the attending physician.

## 2024-10-07 ENCOUNTER — TELEPHONE (OUTPATIENT)
Dept: FAMILY MEDICINE CLINIC | Age: 84
End: 2024-10-07

## 2024-10-07 NOTE — TELEPHONE ENCOUNTER
Pt called and I informed pt that Dr. Avila reviewed her labs and that her lab results were normal except for elevated cholesterol. Verbalized understanding from patient.

## 2025-03-27 ENCOUNTER — TELEPHONE (OUTPATIENT)
Dept: FAMILY MEDICINE CLINIC | Age: 85
End: 2025-03-27

## 2025-03-27 NOTE — TELEPHONE ENCOUNTER
Patient came in to ask for a signed return to work letter. Stated you would be aware of the situation.

## (undated) DEVICE — BIT DRL L200MM DIA2.8MM CALIB L100MM FOR 3.5MM VA LCP PROX

## (undated) DEVICE — PADDING CAST W6INXL4YD COT LO LINTING WYTEX

## (undated) DEVICE — DRESSING,GAUZE,XEROFORM,CURAD,5"X9",ST: Brand: CURAD

## (undated) DEVICE — BIT DRL L110MM DIA2.5MM G QUIK CPL W/O STP REUSE

## (undated) DEVICE — 1000 S-DRAPE TOWEL DRAPE 10/BX: Brand: STERI-DRAPE™

## (undated) DEVICE — BIT DRL L180MM DIA2.5MM QUIK CPL NONRADIOPAQUE W/O STP

## (undated) DEVICE — DRESSING HYDROFIBER AQUACEL AG ADVANTAGE 3.5X6 IN

## (undated) DEVICE — SCREW EXT FIX L150MM DIA5MM THRD L150MM S STL SELF DRL MR

## (undated) DEVICE — BIT DRL L112MM DIA3.5MM ST QUIK CPL NONRADIOPAQUE W/O STP

## (undated) DEVICE — Device

## (undated) DEVICE — LOWER EXT KNEE DRAPE: Brand: MEDLINE INDUSTRIES, INC.

## (undated) DEVICE — SUTURE SUTTAPE L40IN DIA1.3MM NONABSORBABLE WHT BLU L26.5MM AR7500

## (undated) DEVICE — TUBING SUCT 12FR MAL ALUM SHFT FN CAP VENT UNIV CONN W/ OBT

## (undated) DEVICE — 3M™ IOBAN™ 2 ANTIMICROBIAL INCISE DRAPE 6650EZ: Brand: IOBAN™ 2

## (undated) DEVICE — SOLUTION IRRIG 3000ML 0.9% SOD CHL USP UROMATIC PLAS CONT

## (undated) DEVICE — CUFF TOURNIQUET 34 SNG BLADDER DUAL PORT

## (undated) DEVICE — BIT DRL L110MM DIA3.5MM QUIK CPL W/O STP REUSE

## (undated) DEVICE — GOWN,BREATHABLE SLV,AURORA,XLG,STRL: Brand: MEDLINE

## (undated) DEVICE — GLOVE ORTHO 8   MSG9480

## (undated) DEVICE — BANDAGE COMPR W4INXL10YD WHITE/BEIGE E MTRX HK LOOP CLSR

## (undated) DEVICE — CLAMP EXT FIX L TI ALLY COMB CLP ON SELF HLD

## (undated) DEVICE — BANDAGE,GAUZE,4.5"X4.1YD,STERILE,LF: Brand: MEDLINE

## (undated) DEVICE — GLOVE ORANGE PI 8   MSG9080

## (undated) DEVICE — DRESSING HYDROFIBER AQUACEL AG ADVANTAGE 3.5X12 IN

## (undated) DEVICE — PADDING,UNDERCAST,COTTON, 4"X4YD STERILE: Brand: MEDLINE

## (undated) DEVICE — SPLINT KNEE UNIV FOR LESS THAN 36IN L24IN FOAM LAM E CNTCT

## (undated) DEVICE — SCREW EXT FIX L175MM DIA5MM THRD L60MM S STL SELF DRL SCHNZ

## (undated) DEVICE — SCREW BNE L75MM DIA3.5MM CORT S STL ST FULL THRD HEX RECESS
Type: IMPLANTABLE DEVICE | Site: LEG | Status: NON-FUNCTIONAL
Removed: 2023-02-15

## (undated) DEVICE — BIT DRL L110MM DIA2.5MM ST G QUIK CPL NONRADIOPAQUE W/O STP

## (undated) DEVICE — SUTURE FIBERWIRE SZ 2 W/ TAPERED NEEDLE BLUE L38IN NONABSORB BLU L26.5MM 1/2 CIRCLE AR7200